# Patient Record
Sex: FEMALE | Race: WHITE
[De-identification: names, ages, dates, MRNs, and addresses within clinical notes are randomized per-mention and may not be internally consistent; named-entity substitution may affect disease eponyms.]

---

## 2017-02-27 ENCOUNTER — HOSPITAL ENCOUNTER (OUTPATIENT)
Dept: HOSPITAL 53 - M CARPUL | Age: 62
End: 2017-02-27
Attending: INTERNAL MEDICINE
Payer: COMMERCIAL

## 2017-02-27 DIAGNOSIS — C50.919: Primary | ICD-10-CM

## 2017-02-28 NOTE — ECHO
DATE OF PROCEDURE:  02/27/2017

 

REFERRING PHYSICIAN:  Abigail Maddox.

 

INDICATION:  Chemotherapy.

 

HEIGHT: 150 cm.

WEIGHT: 59 kg.

 

DIMENSION:

IVS: 0.9

LV: 4.3

LVPW:0.9

LA: 3.0

Aorta: 2.6

 

FINDINGS: The study is of acceptable technical quality even though somewhat

limited due to breast implants creating considerable shadowing artifacts.  Left

ventricle is normal size and normal systolic function with estimated EF 65-70%.

No segmental wall motion abnormalities are appreciated.  Right ventricle is also

normal systolic function.  Both atria appear normal.  All four cardiac valves

appear normal.  No pericardial effusion is noted.  Inferior vena cava is normal

size.  Aortic root, aortic arch and visualized segment of abdominal aorta all

appear normal.

 

Doppler interrogation reveals no significant aortic disease.  There is mild

mitral insufficiency and mild tricuspid insufficiency.  Calculated pulmonary

artery pressure is within normal limits.  Pulmonic valve is also functionally

competent.

 

Mitral inflow pattern and tissue Doppler imaging of mitral annulus revealed

normal diastolic function.

 

CONCLUSIONS:

1.  Study is of acceptable technical quality.

2.  Normal LV size and systolic function, normal diastolic function.

3.  No significant valvular disease.

4.  Normal central venous pressure and likely normal pulmonary artery pressure.

 

COMMENT:

Subacute bacterial endocarditis (SBE) prophylaxis is not recommended.

Essentially normal echocardiogram.

## 2017-03-01 ENCOUNTER — HOSPITAL ENCOUNTER (OUTPATIENT)
Dept: HOSPITAL 53 - M LAB REF | Age: 62
End: 2017-03-01
Attending: INTERNAL MEDICINE
Payer: COMMERCIAL

## 2017-03-01 DIAGNOSIS — C50.919: Primary | ICD-10-CM

## 2017-03-02 ENCOUNTER — HOSPITAL ENCOUNTER (OUTPATIENT)
Dept: HOSPITAL 53 - M SFHCPLAZ | Age: 62
End: 2017-03-02
Attending: NURSE PRACTITIONER
Payer: COMMERCIAL

## 2017-03-02 DIAGNOSIS — Z12.4: Primary | ICD-10-CM

## 2017-03-02 DIAGNOSIS — N95.2: ICD-10-CM

## 2017-06-02 ENCOUNTER — HOSPITAL ENCOUNTER (OUTPATIENT)
Dept: HOSPITAL 53 - M WHC | Age: 62
End: 2017-06-02
Attending: INTERNAL MEDICINE
Payer: COMMERCIAL

## 2017-06-02 DIAGNOSIS — Z78.0: ICD-10-CM

## 2017-06-02 DIAGNOSIS — Z85.3: Primary | ICD-10-CM

## 2017-06-06 NOTE — DEXA
AP SPINE   L1 - L4   1.039   -1.3       0.2

LT FEMUR   TOTAL   0.770   -1.9      -0.8

RT FEMUR   TOTAL   0.763   -1.9      -0.8

TOTAL BODY   TOTAL

OTHER



DUAL FEMUR FRAX* ASSESSMENT

Risk factors:               None.

10 year probability of fracture

Major osteoporotic fracture            11.5 %

Hip fracture                 2.2 %



COMMENTS:

There is low bone density of the spine and hips.



FOLLOW-UP:

Recommendation for the next bone density exam: 2 years.
CUATED

## 2017-12-05 ENCOUNTER — HOSPITAL ENCOUNTER (OUTPATIENT)
Dept: HOSPITAL 53 - M LAB REF | Age: 62
End: 2017-12-05
Attending: INTERNAL MEDICINE
Payer: COMMERCIAL

## 2017-12-05 DIAGNOSIS — C50.919: Primary | ICD-10-CM

## 2017-12-05 LAB
FERRITIN SERPL-MCNC: 142 NG/ML (ref 8–252)
IRON SATN MFR SERPL: 18.7 % (ref 13.2–45)
TIBC SERPL-MCNC: 326 UG/DL (ref 250–450)
VIT B12 SERPL-MCNC: 238 PG/ML (ref 247–911)

## 2018-01-04 ENCOUNTER — HOSPITAL ENCOUNTER (OUTPATIENT)
Dept: HOSPITAL 53 - M RAD | Age: 63
End: 2018-01-04
Attending: NURSE PRACTITIONER
Payer: COMMERCIAL

## 2018-01-04 DIAGNOSIS — Z90.11: Primary | ICD-10-CM

## 2018-01-04 DIAGNOSIS — Z98.82: ICD-10-CM

## 2018-01-04 DIAGNOSIS — Z85.3: ICD-10-CM

## 2018-01-04 PROCEDURE — 76642 ULTRASOUND BREAST LIMITED: CPT

## 2018-06-04 ENCOUNTER — HOSPITAL ENCOUNTER (OUTPATIENT)
Dept: HOSPITAL 53 - M LAB | Age: 63
End: 2018-06-04
Attending: PODIATRIST
Payer: COMMERCIAL

## 2018-06-04 DIAGNOSIS — Z79.899: ICD-10-CM

## 2018-06-04 DIAGNOSIS — B35.1: ICD-10-CM

## 2018-06-04 DIAGNOSIS — Z51.81: Primary | ICD-10-CM

## 2018-06-04 LAB
ALBUMIN/GLOBULIN RATIO: 1.22 (ref 1–1.93)
ALBUMIN: 3.9 GM/DL (ref 3.2–5.2)
ALKALINE PHOSPHATASE: 48 U/L (ref 45–117)
ALT SERPL W P-5'-P-CCNC: 17 U/L (ref 12–78)
ANION GAP: 11 MEQ/L (ref 8–16)
AST SERPL-CCNC: 15 U/L (ref 7–37)
BILIRUB CONJ SERPL-MCNC: < 0.1 MG/DL (ref 0–0.2)
BILIRUBIN,TOTAL: 0.2 MG/DL (ref 0.2–1)
BLOOD UREA NITROGEN: 20 MG/DL (ref 7–18)
CALCIUM LEVEL: 9 MG/DL (ref 8.8–10.2)
CARBON DIOXIDE LEVEL: 25 MEQ/L (ref 21–32)
CHLORIDE LEVEL: 107 MEQ/L (ref 98–107)
CREATININE FOR GFR: 0.74 MG/DL (ref 0.55–1.3)
GFR SERPL CREATININE-BSD FRML MDRD: > 60 ML/MIN/{1.73_M2} (ref 45–?)
GLUCOSE, FASTING: 79 MG/DL (ref 70–100)
HEMATOCRIT: 38.1 % (ref 36–47)
HEMOGLOBIN: 12.9 G/DL (ref 12–15.5)
MEAN CORPUSCULAR HEMOGLOBIN: 30.9 PG (ref 27–33)
MEAN CORPUSCULAR HGB CONC: 33.9 G/DL (ref 32–36.5)
MEAN CORPUSCULAR VOLUME: 91.4 FL (ref 80–96)
NRBC BLD AUTO-RTO: 0 % (ref 0–0)
PHOSPHORUS LEVEL: 3.1 MG/DL (ref 2.5–4.9)
PLATELET COUNT, AUTOMATED: 202 10^3/UL (ref 150–450)
POTASSIUM SERUM: 3.6 MEQ/L (ref 3.5–5.1)
RED BLOOD COUNT: 4.17 10^6/UL (ref 4–5.4)
RED CELL DISTRIBUTION WIDTH: 13.2 % (ref 11.5–14.5)
SODIUM LEVEL: 143 MEQ/L (ref 136–145)
TOTAL PROTEIN: 7.1 GM/DL (ref 6.4–8.2)
WHITE BLOOD COUNT: 5.1 10^3/UL (ref 4–10)

## 2018-06-04 PROCEDURE — 80076 HEPATIC FUNCTION PANEL: CPT

## 2018-09-12 ENCOUNTER — HOSPITAL ENCOUNTER (OUTPATIENT)
Dept: HOSPITAL 53 - M LAB | Age: 63
End: 2018-09-12
Attending: PODIATRIST
Payer: COMMERCIAL

## 2018-09-12 DIAGNOSIS — Z51.81: Primary | ICD-10-CM

## 2018-09-12 DIAGNOSIS — B35.1: ICD-10-CM

## 2018-09-12 DIAGNOSIS — Z79.899: ICD-10-CM

## 2018-09-12 LAB
ALBUMIN/GLOBULIN RATIO: 1.21 (ref 1–1.93)
ALBUMIN: 4.1 GM/DL (ref 3.2–5.2)
ALKALINE PHOSPHATASE: 46 U/L (ref 45–117)
ALT SERPL W P-5'-P-CCNC: 19 U/L (ref 12–78)
ANION GAP: 4 MEQ/L (ref 8–16)
AST SERPL-CCNC: 13 U/L (ref 7–37)
BILIRUB CONJ SERPL-MCNC: < 0.1 MG/DL (ref 0–0.2)
BILIRUBIN,TOTAL: 0.2 MG/DL (ref 0.2–1)
BLOOD UREA NITROGEN: 16 MG/DL (ref 7–18)
CALCIUM LEVEL: 9.5 MG/DL (ref 8.8–10.2)
CARBON DIOXIDE LEVEL: 30 MEQ/L (ref 21–32)
CHLORIDE LEVEL: 105 MEQ/L (ref 98–107)
CREATININE FOR GFR: 0.78 MG/DL (ref 0.55–1.3)
GFR SERPL CREATININE-BSD FRML MDRD: > 60 ML/MIN/{1.73_M2} (ref 45–?)
GLUCOSE, FASTING: 96 MG/DL (ref 70–100)
HEMATOCRIT: 39.4 % (ref 36–47)
HEMOGLOBIN: 13.2 G/DL (ref 12–15.5)
MEAN CORPUSCULAR HEMOGLOBIN: 30.1 PG (ref 27–33)
MEAN CORPUSCULAR HGB CONC: 33.5 G/DL (ref 32–36.5)
MEAN CORPUSCULAR VOLUME: 89.7 FL (ref 80–96)
NRBC BLD AUTO-RTO: 0 % (ref 0–0)
PHOSPHORUS LEVEL: 2.9 MG/DL (ref 2.5–4.9)
PLATELET COUNT, AUTOMATED: 222 10^3/UL (ref 150–450)
POTASSIUM SERUM: 3.7 MEQ/L (ref 3.5–5.1)
RED BLOOD COUNT: 4.39 10^6/UL (ref 4–5.4)
RED CELL DISTRIBUTION WIDTH: 12.6 % (ref 11.5–14.5)
SODIUM LEVEL: 139 MEQ/L (ref 136–145)
TOTAL PROTEIN: 7.5 GM/DL (ref 6.4–8.2)
WHITE BLOOD COUNT: 4.9 10^3/UL (ref 4–10)

## 2018-09-12 PROCEDURE — 80076 HEPATIC FUNCTION PANEL: CPT

## 2018-09-19 ENCOUNTER — HOSPITAL ENCOUNTER (OUTPATIENT)
Dept: HOSPITAL 53 - M RAD | Age: 63
End: 2018-09-19
Attending: SURGERY
Payer: COMMERCIAL

## 2018-09-19 DIAGNOSIS — Z92.3: ICD-10-CM

## 2018-09-19 DIAGNOSIS — Z92.21: ICD-10-CM

## 2018-09-19 DIAGNOSIS — Z80.3: ICD-10-CM

## 2018-09-19 DIAGNOSIS — Z85.3: Primary | ICD-10-CM

## 2018-09-19 DIAGNOSIS — Z90.13: ICD-10-CM

## 2018-09-19 DIAGNOSIS — Z98.82: ICD-10-CM

## 2019-06-03 ENCOUNTER — HOSPITAL ENCOUNTER (OUTPATIENT)
Dept: HOSPITAL 53 - M WHC | Age: 64
End: 2019-06-03
Attending: INTERNAL MEDICINE
Payer: COMMERCIAL

## 2019-06-03 DIAGNOSIS — Z78.0: ICD-10-CM

## 2019-06-03 DIAGNOSIS — M85.89: Primary | ICD-10-CM

## 2019-06-04 NOTE — DEXA
AP SPINE   L1 - L4   1.070   -1.0       0.3

LT FEMUR   TOTAL   0.788   -1.7      -0.7

LT NECK      0.786   -1.9      -0.6

RT FEMUR   TOTAL   0.796   -1.7      -0.7      

RT NECK      0.723   -2.3      -1.0

TOTAL BODY   TOTAL

OTHER



COMMENTS:

There is low bone density of the spine and hips.

The density of the spine has increased 3.0% since 06/02/2017.

The density of the left hip has increased 2.3% since 06/02/2017.

The density of the right hip has increased 4.3% since 06/02/2017.

The increased density of the spine does represent a significant change.

The increased density of the left hip does represent a significant change.

The increased density of the right hip does represent a significant change.



FOLLOW-UP:

Recommendation for the next bone density exam: 2 years.



TAVO

## 2019-08-06 ENCOUNTER — HOSPITAL ENCOUNTER (OUTPATIENT)
Dept: HOSPITAL 53 - M SFHCPLAZ | Age: 64
End: 2019-08-06
Attending: NURSE PRACTITIONER
Payer: COMMERCIAL

## 2019-08-06 DIAGNOSIS — Z01.419: Primary | ICD-10-CM

## 2020-08-04 ENCOUNTER — HOSPITAL ENCOUNTER (OUTPATIENT)
Dept: HOSPITAL 53 - M SFHCPLAZ | Age: 65
End: 2020-08-04
Attending: NURSE PRACTITIONER
Payer: COMMERCIAL

## 2020-08-04 DIAGNOSIS — Z01.419: Primary | ICD-10-CM

## 2020-12-01 ENCOUNTER — HOSPITAL ENCOUNTER (OUTPATIENT)
Dept: HOSPITAL 53 - M LAB | Age: 65
End: 2020-12-01
Attending: PHYSICIAN ASSISTANT
Payer: COMMERCIAL

## 2020-12-01 DIAGNOSIS — Z01.818: Primary | ICD-10-CM

## 2020-12-01 LAB
CREAT SERPL-MCNC: 0.74 MG/DL (ref 0.55–1.3)
GFR SERPL CREATININE-BSD FRML MDRD: > 60 ML/MIN/{1.73_M2} (ref 45–?)

## 2020-12-03 LAB — BUN SERPL-MCNC: 17 MG/DL (ref 7–18)

## 2020-12-08 ENCOUNTER — HOSPITAL ENCOUNTER (OUTPATIENT)
Dept: HOSPITAL 53 - M RAD | Age: 65
End: 2020-12-08
Attending: PHYSICIAN ASSISTANT
Payer: COMMERCIAL

## 2020-12-08 DIAGNOSIS — Z98.82: ICD-10-CM

## 2020-12-08 DIAGNOSIS — Z85.3: Primary | ICD-10-CM

## 2020-12-08 DIAGNOSIS — Z80.3: ICD-10-CM

## 2020-12-08 DIAGNOSIS — Z90.13: ICD-10-CM

## 2020-12-08 NOTE — REP
INDICATION:

PERSONAL H/O BREAST CA SCREENING.  BRCA 2 mutation.  Right-sided pain.



COMPARISON:

Comparison bilateral breast MRI study is from September 19, 2018.



TECHNIQUE:

Three Ilene MRI imaging was performed with a dedicated breast coil.  Axial, coronal,

and sagittal T1 and T2 weighted scans were obtained with and without fat saturation in

the usual fashion.  The study includes dynamically acquired post gadolinium-enhanced

imaging with image subtraction.  Maximum intensity projection and multi planar

reformation imaging is included as well.  This study is interpreted with the aid of

[employer], an FDA approved computer aided detection (CAD) software program, on a

dedicated breast MRI workstation.  The gadolinium enhancement dose is 10 mL of

intravenous ProHance.



FINDINGS:

Patient is status post bilateral mastectomy and breast reconstruction.  There are

multiple small foci of micrometallic artifact in the breast tissue bilaterally.  There

is a small amount of fluid surrounding each augmentation implant, left again slightly

more prominent than right but unchanged from the comparison study.  Implant margins

are smooth.  There is no evidence of intracapsular or extracapsular implant

disruption.  There is no evidence of axillary lymphadenopathy.  No cyst or other fluid

collection is seen.  No suspicious morphologic abnormality is seen on either side.

Dynamically acquired sequential postcontrast images show no suspicious focus of

enhancement and/or washout in either breast to suggest malignancy.  Subtraction images

are unremarkable.



IMPRESSION:

Stable BI-RADS category 2 benign findings.  No suspicious abnormality.  There is a

small amount of Tawana implant fluid bilaterally, left a little greater than right

unchanged from the comparison study September 2018.





<Electronically signed by Armani Murphy > 12/08/20 [id number]

## 2021-02-25 ENCOUNTER — HOSPITAL ENCOUNTER (OUTPATIENT)
Dept: HOSPITAL 53 - M LAB | Age: 66
End: 2021-02-25
Attending: PSYCHIATRY & NEUROLOGY
Payer: COMMERCIAL

## 2021-02-25 DIAGNOSIS — E53.9: ICD-10-CM

## 2021-02-25 DIAGNOSIS — R25.1: Primary | ICD-10-CM

## 2021-02-25 LAB
ALBUMIN SERPL BCG-MCNC: 4 GM/DL (ref 3.2–5.2)
ALT SERPL W P-5'-P-CCNC: 54 U/L (ref 12–78)
BASOPHILS # BLD AUTO: 0 10^3/UL (ref 0–0.2)
BASOPHILS NFR BLD AUTO: 0.2 % (ref 0–1)
BILIRUB SERPL-MCNC: 0.7 MG/DL (ref 0.2–1)
BUN SERPL-MCNC: 22 MG/DL (ref 7–18)
CALCIUM SERPL-MCNC: 9.1 MG/DL (ref 8.8–10.2)
CHLORIDE SERPL-SCNC: 92 MEQ/L (ref 98–107)
CO2 SERPL-SCNC: 29 MEQ/L (ref 21–32)
CREAT SERPL-MCNC: 0.68 MG/DL (ref 0.55–1.3)
EOSINOPHIL # BLD AUTO: 0 10^3/UL (ref 0–0.5)
EOSINOPHIL NFR BLD AUTO: 0 % (ref 0–3)
FOLATE SERPL-MCNC: 7.1 NG/ML
GFR SERPL CREATININE-BSD FRML MDRD: > 60 ML/MIN/{1.73_M2} (ref 45–?)
GLUCOSE SERPL-MCNC: 125 MG/DL (ref 70–100)
HCT VFR BLD AUTO: 37.7 % (ref 36–47)
HGB BLD-MCNC: 12.7 G/DL (ref 12–15.5)
LYMPHOCYTES # BLD AUTO: 0.7 10^3/UL (ref 1.5–5)
LYMPHOCYTES NFR BLD AUTO: 6.8 % (ref 24–44)
MCH RBC QN AUTO: 30.2 PG (ref 27–33)
MCHC RBC AUTO-ENTMCNC: 33.7 G/DL (ref 32–36.5)
MCV RBC AUTO: 89.5 FL (ref 80–96)
MONOCYTES # BLD AUTO: 0.5 10^3/UL (ref 0–0.8)
MONOCYTES NFR BLD AUTO: 5 % (ref 2–8)
NEUTROPHILS # BLD AUTO: 9.4 10^3/UL (ref 1.5–8.5)
NEUTROPHILS NFR BLD AUTO: 87.4 % (ref 36–66)
PLATELET # BLD AUTO: 301 10^3/UL (ref 150–450)
POTASSIUM SERPL-SCNC: 3.9 MEQ/L (ref 3.5–5.1)
PROT SERPL-MCNC: 7 GM/DL (ref 6.4–8.2)
RBC # BLD AUTO: 4.21 10^6/UL (ref 4–5.4)
SODIUM SERPL-SCNC: 133 MEQ/L (ref 136–145)
TSH SERPL DL<=0.005 MIU/L-ACNC: 0.61 UIU/ML (ref 0.36–3.74)
VIT B12 SERPL-MCNC: 390 PG/ML
WBC # BLD AUTO: 10.7 10^3/UL (ref 4–10)

## 2021-03-04 ENCOUNTER — HOSPITAL ENCOUNTER (INPATIENT)
Dept: HOSPITAL 53 - M ED | Age: 66
LOS: 3 days | Discharge: HOME | End: 2021-03-07
Attending: FAMILY MEDICINE | Admitting: GENERAL PRACTICE
Payer: COMMERCIAL

## 2021-03-04 VITALS — WEIGHT: 118.17 LBS | HEIGHT: 59 IN | BODY MASS INDEX: 23.82 KG/M2

## 2021-03-04 DIAGNOSIS — R74.01: ICD-10-CM

## 2021-03-04 DIAGNOSIS — Z87.891: ICD-10-CM

## 2021-03-04 DIAGNOSIS — Z20.822: ICD-10-CM

## 2021-03-04 DIAGNOSIS — R00.1: ICD-10-CM

## 2021-03-04 DIAGNOSIS — Z79.899: ICD-10-CM

## 2021-03-04 DIAGNOSIS — R26.81: ICD-10-CM

## 2021-03-04 DIAGNOSIS — Z85.3: ICD-10-CM

## 2021-03-04 DIAGNOSIS — G47.00: ICD-10-CM

## 2021-03-04 DIAGNOSIS — F10.139: Primary | ICD-10-CM

## 2021-03-04 DIAGNOSIS — D64.9: ICD-10-CM

## 2021-03-04 DIAGNOSIS — I16.0: ICD-10-CM

## 2021-03-04 DIAGNOSIS — F32.9: ICD-10-CM

## 2021-03-04 DIAGNOSIS — Z90.13: ICD-10-CM

## 2021-03-04 DIAGNOSIS — F41.9: ICD-10-CM

## 2021-03-04 DIAGNOSIS — Z90.49: ICD-10-CM

## 2021-03-04 LAB
A-TOCOPHEROL VIT E SERPL-MCNC: 12.8 MG/L (ref 9–29)
ALBUMIN SERPL BCG-MCNC: 4.2 GM/DL (ref 3.2–5.2)
ALT SERPL W P-5'-P-CCNC: 60 U/L (ref 12–78)
BASOPHILS # BLD AUTO: 0 10^3/UL (ref 0–0.2)
BASOPHILS NFR BLD AUTO: 0.2 % (ref 0–1)
BILIRUB CONJ SERPL-MCNC: < 0.1 MG/DL (ref 0–0.2)
BILIRUB SERPL-MCNC: 0.3 MG/DL (ref 0.2–1)
BUN SERPL-MCNC: 14 MG/DL (ref 7–18)
CALCIUM SERPL-MCNC: 9 MG/DL (ref 8.8–10.2)
CERULOPLASMIN SERPL-MCNC: 22.2 MG/DL (ref 19–39)
CHLORIDE SERPL-SCNC: 96 MEQ/L (ref 98–107)
CK MB CFR.DF SERPL CALC: 4.17
CK MB SERPL-MCNC: 2.5 NG/ML (ref ?–3.6)
CK SERPL-CCNC: 60 U/L (ref 26–192)
CO2 SERPL-SCNC: 18 MEQ/L (ref 21–32)
COPPER SERPL-MCNC: 107 UG/DL (ref 80–158)
CREAT SERPL-MCNC: 0.7 MG/DL (ref 0.55–1.3)
EOSINOPHIL # BLD AUTO: 0 10^3/UL (ref 0–0.5)
EOSINOPHIL NFR BLD AUTO: 0.1 % (ref 0–3)
GAMMA TOCOPHEROL SERPL-MCNC: 3.2 MG/L (ref 0.5–4.9)
GFR SERPL CREATININE-BSD FRML MDRD: > 60 ML/MIN/{1.73_M2} (ref 45–?)
GLUCOSE SERPL-MCNC: 77 MG/DL (ref 70–100)
HCT VFR BLD AUTO: 41.3 % (ref 36–47)
HGB BLD-MCNC: 13.7 G/DL (ref 12–15.5)
LEAD BLDV-MCNC: 1 UG/DL (ref 0–4)
LIPASE SERPL-CCNC: 82 U/L (ref 73–393)
LYMPHOCYTES # BLD AUTO: 1.3 10^3/UL (ref 1.5–5)
LYMPHOCYTES NFR BLD AUTO: 12.6 % (ref 24–44)
MCH RBC QN AUTO: 29.9 PG (ref 27–33)
MCHC RBC AUTO-ENTMCNC: 33.2 G/DL (ref 32–36.5)
MCV RBC AUTO: 90.2 FL (ref 80–96)
MERCURY BLD-MCNC: 1.1 UG/L (ref 0–14.9)
MONOCYTES # BLD AUTO: 0.8 10^3/UL (ref 0–0.8)
MONOCYTES NFR BLD AUTO: 7.9 % (ref 2–8)
NEUTROPHILS # BLD AUTO: 7.8 10^3/UL (ref 1.5–8.5)
NEUTROPHILS NFR BLD AUTO: 78.4 % (ref 36–66)
PLATELET # BLD AUTO: 353 10^3/UL (ref 150–450)
POTASSIUM SERPL-SCNC: 4.4 MEQ/L (ref 3.5–5.1)
PROT SERPL-MCNC: 7.8 GM/DL (ref 6.4–8.2)
PYRIDOXAL PHOS SERPL-MCNC: 3.8 UG/L (ref 2–32.8)
RBC # BLD AUTO: 4.58 10^6/UL (ref 4–5.4)
SODIUM SERPL-SCNC: 136 MEQ/L (ref 136–145)
TROPONIN I SERPL-MCNC: < 0.02 NG/ML (ref ?–0.1)
VIT B1 BLD-SCNC: 106.5 NMOL/L (ref 66.5–200)
WBC # BLD AUTO: 9.9 10^3/UL (ref 4–10)

## 2021-03-04 NOTE — REPVR
PROCEDURE INFORMATION: 

Exam: XR Complete Acute Abdomen Series 

Exam date and time: 3/4/2021 8:52 PM 

Age: 65 years old 

Clinical indication: Other: Abdominal pain 



TECHNIQUE: 

Imaging protocol: XR complete acute abdomen series, including 2 or more views 

of the abdomen and a single view chest. 



COMPARISON: 

CR Chest, 2 view PA, Lat 6/20/2016 2:02 PM 



FINDINGS: 

Lungs: Normal. No consolidation. 

Pleural spaces: Normal. No pleural effusions. No pneumothorax. 

Heart/Mediastinum: Normal. No cardiomegaly. 

Gastrointestinal tract: Normal. No bowel dilation. 

Intraperitoneal space: Normal. No free air. 

Bones/joints: Normal. No acute fracture. 

Soft tissues: Normal. 



IMPRESSION: 

No acute findings. 



Electronically signed by: Lenin Smith On 03/04/2021  21:08:15 PM

## 2021-03-05 VITALS — OXYGEN SATURATION: 98 % | SYSTOLIC BLOOD PRESSURE: 142 MMHG | DIASTOLIC BLOOD PRESSURE: 67 MMHG

## 2021-03-05 VITALS — OXYGEN SATURATION: 97 %

## 2021-03-05 VITALS — OXYGEN SATURATION: 98 % | DIASTOLIC BLOOD PRESSURE: 70 MMHG | SYSTOLIC BLOOD PRESSURE: 128 MMHG

## 2021-03-05 VITALS — DIASTOLIC BLOOD PRESSURE: 80 MMHG | SYSTOLIC BLOOD PRESSURE: 137 MMHG

## 2021-03-05 VITALS — OXYGEN SATURATION: 98 %

## 2021-03-05 VITALS — SYSTOLIC BLOOD PRESSURE: 134 MMHG | DIASTOLIC BLOOD PRESSURE: 64 MMHG

## 2021-03-05 VITALS — OXYGEN SATURATION: 96 %

## 2021-03-05 VITALS — DIASTOLIC BLOOD PRESSURE: 57 MMHG | OXYGEN SATURATION: 96 % | SYSTOLIC BLOOD PRESSURE: 116 MMHG

## 2021-03-05 VITALS — DIASTOLIC BLOOD PRESSURE: 57 MMHG | SYSTOLIC BLOOD PRESSURE: 104 MMHG

## 2021-03-05 LAB
ALBUMIN SERPL BCG-MCNC: 2.9 GM/DL (ref 3.2–5.2)
ALT SERPL W P-5'-P-CCNC: 39 U/L (ref 12–78)
AMPHETAMINES UR QL SCN: NEGATIVE
BARBITURATES UR QL SCN: NEGATIVE
BENZODIAZ UR QL SCN: POSITIVE
BILIRUB SERPL-MCNC: 0.7 MG/DL (ref 0.2–1)
BUN SERPL-MCNC: 12 MG/DL (ref 7–18)
BZE UR QL SCN: NEGATIVE
CALCIUM SERPL-MCNC: 7.1 MG/DL (ref 8.8–10.2)
CANNABINOIDS UR QL SCN: POSITIVE
CHLORIDE SERPL-SCNC: 103 MEQ/L (ref 98–107)
CO2 SERPL-SCNC: 26 MEQ/L (ref 21–32)
CREAT SERPL-MCNC: 0.66 MG/DL (ref 0.55–1.3)
GFR SERPL CREATININE-BSD FRML MDRD: > 60 ML/MIN/{1.73_M2} (ref 45–?)
GLUCOSE SERPL-MCNC: 182 MG/DL (ref 70–100)
HCT VFR BLD AUTO: 27.7 % (ref 36–47)
HGB BLD-MCNC: 9.6 G/DL (ref 12–15.5)
MCH RBC QN AUTO: 31 PG (ref 27–33)
MCHC RBC AUTO-ENTMCNC: 34.7 G/DL (ref 32–36.5)
MCV RBC AUTO: 89.4 FL (ref 80–96)
METHADONE UR QL SCN: NEGATIVE
OPIATES UR QL SCN: NEGATIVE
PCP UR QL SCN: NEGATIVE
PLATELET # BLD AUTO: 240 10^3/UL (ref 150–450)
POTASSIUM SERPL-SCNC: 3.6 MEQ/L (ref 3.5–5.1)
PROT SERPL-MCNC: 5.6 GM/DL (ref 6.4–8.2)
RBC # BLD AUTO: 3.1 10^6/UL (ref 4–5.4)
RSV RNA NPH QL NAA+PROBE: NEGATIVE
SODIUM SERPL-SCNC: 136 MEQ/L (ref 136–145)
WBC # BLD AUTO: 6 10^3/UL (ref 4–10)

## 2021-03-05 RX ADMIN — NITROGLYCERIN SCH GM: 20 OINTMENT TOPICAL at 00:35

## 2021-03-05 RX ADMIN — NITROGLYCERIN SCH GM: 20 OINTMENT TOPICAL at 04:00

## 2021-03-05 RX ADMIN — OXAZEPAM SCH MG: 10 CAPSULE, GELATIN COATED ORAL at 21:51

## 2021-03-05 RX ADMIN — NITROGLYCERIN SCH GM: 20 OINTMENT TOPICAL at 08:00

## 2021-03-05 RX ADMIN — SODIUM CHLORIDE, PRESERVATIVE FREE SCH ML: 5 INJECTION INTRAVENOUS at 09:22

## 2021-03-05 RX ADMIN — DULOXETINE SCH MG: 20 CAPSULE, DELAYED RELEASE ORAL at 11:14

## 2021-03-05 RX ADMIN — THIAMINE HYDROCHLORIDE SCH MLS/HR: 100 INJECTION, SOLUTION INTRAMUSCULAR; INTRAVENOUS at 11:14

## 2021-03-05 RX ADMIN — OXAZEPAM SCH MG: 10 CAPSULE, GELATIN COATED ORAL at 09:22

## 2021-03-05 RX ADMIN — ENOXAPARIN SODIUM SCH MG: 40 INJECTION SUBCUTANEOUS at 09:00

## 2021-03-05 RX ADMIN — OXAZEPAM SCH MG: 10 CAPSULE, GELATIN COATED ORAL at 16:00

## 2021-03-05 RX ADMIN — THIAMINE HYDROCHLORIDE SCH MLS/HR: 100 INJECTION, SOLUTION INTRAMUSCULAR; INTRAVENOUS at 21:50

## 2021-03-05 RX ADMIN — SODIUM CHLORIDE, PRESERVATIVE FREE SCH ML: 5 INJECTION INTRAVENOUS at 21:53

## 2021-03-05 NOTE — IPNPDOC
Date Seen


The patient was seen on 3/5/21.





Progress Note


SUBJECTIVE: 64 yo F with hx of breast ca s/p mastectomy w reconstruction, 

chrohn's disease (followed by Dr. Shell and Dr. David), c diff 

endometriosis, anxiety, depression, etoh use disorder, sent to ER from urgent 

care with symptoms of nausea, tremors, hypertensive urgency 2/2 suspected acute 

alcohol withdrawal. 





Patient was seen and examined at bedside this morning. Blood pressure normalized

after IV labetalol, clonidine and benzodiazepines. She is tremulous, and 

somnolent but is alert to person, place and time. She denies visual or auditory 

hallucination or SI. No chest pain, no fevers, chills, SOB, palpitations or 

n/v/d.





OBJECTIVE


PHYSICAL EXAMINATION:


VITAL SIGNS: please see below


General: somnolent, tremulous


HEENT: PERRLA, EOMI, sclerae clear


Neck: supple, normal ROM, no JVD


Respiratory: lungs CTAB, no wheeze, no rales, no crackles


CVS: RRR, normal S1, S2, no murmurs


Abdo: soft, no masses, no hepatosplenomegaly, BS+, no rebound tenderness


Extremities: no edema, pulses 2+


MSK: no joint deformities, normal ROM


Neuro: no focal neuro deficits, moving all 4 extremities, CN2-12 intact. 

Strength 5/5 in all 4 extremities. No nystagmus. 


Psych: calm, cooperative, AAO x 3. tremulous. somnolent.





LABORATORY DATA, IMAGING STUDIES, MICROBIOLOGY: Please see below.





DVT prophylaxis ordered?: SCDs. TEDs. Lovenox.





ASSESSMENT AND PLAN





Alcohol withdrawal


- continue with PCU status


- tox screen, serum etoh pending


- ongoing CIWA protocol


- changed valium to ativan 2 mg q1h prn for CIWA > 8 


- serax 20 mg q6h scheduled


- continuous O2 monitoring


- start thiamine 500 gm q8h x 3 days


- MVTs/B12/folate


- PFS for etoh rehab program





Hypertensive urgency


- DC clonidine


- BP normalized s/p labetalol and clonidine


- Hypertensive episode likely due to acute etoh withdrawal


- will monitor BP and titrate, amlodipine if BP elevation recurs


- hydralazine PO prn





Breast cancer status post bilateral mastectomy with reconstruction 


-Outpatient follow-up with her medical oncologist





History of Endometriosis


-No acute complaints


- follow up outpatient with gyne





anxiety, depression, insomnia, 


-Chronic


- duloxetine at home


- will hold today, resume on 3/6/21 as presently receiving high dose 

benzodiazepine for etoh withdrawal.


- trazodone held until 3/6/21





prior history of tobacco abuse. 


-No documented diagnosis of COPD. no wheezing on exam





Crohn's disease followed by Dr. Sotelo and Dr. Shell. 


-Outpatient follow-up





Dispo: pending clinical improvement





VS, I&O, 24H, Fishbone


Vital Signs/I&O





Vital Signs








  Date Time  Temp Pulse Resp B/P (MAP) Pulse Ox O2 Delivery O2 Flow Rate FiO2


 


3/5/21 08:00 98.7 72 16 116/57 (76) 95 Room Air  














I&O- Last 24 Hours up to 6 AM 


 


 3/5/21





 06:00


 


Intake Total 112 ml


 


Output Total 0 ml


 


Balance 112 ml











Laboratory Data


24H LABS


Laboratory Tests 2


3/4/21 20:32: 


Immature Granulocyte % (Auto) 0.8, Neutrophils (%) (Auto) 78.4H, Lymphocytes (%)

(Auto) 12.6L, Monocytes (%) (Auto) 7.9, Eosinophils (%) (Auto) 0.1, Basophils 

(%) (Auto) 0.2, Neutrophils # (Auto) 7.8, Lymphocytes # (Auto) 1.3L, Monocytes #

(Auto) 0.8, Eosinophils # (Auto) 0.0, Basophils # (Auto) 0.0, Nucleated Red 

Blood Cells % (auto) 0.0, Anion Gap 22H, Glomerular Filtration Rate > 60.0, 

Calcium Level 9.0, Total Bilirubin 0.3, Direct Bilirubin < 0.1, Aspartate Amino 

Transf (AST/SGOT) 45H, Alanine Aminotransferase (ALT/SGPT) 60, Alkaline 

Phosphatase 74, Total Creatine Kinase 60, Creatine Kinase MB 2.5, Creatine Suzy

se MB Relative Index 4.17H, Troponin I < 0.02, Total Protein 7.8, Albumin 4.2, 

Albumin/Globulin Ratio 1.2, Lipase 82


3/5/21 00:13: 


Coronavirus (COVID-19)(PCR) NEGATIVE, Influenza Type A (RT-PCR) NEGATIVE, 

Influenza Type B (RT-PCR) NEGATIVE, Respiratory Syncytial Virus (PCR) NEGATIVE


3/5/21 04:13: 


Nucleated Red Blood Cells % (auto) 0.0, Anion Gap 7L, Glomerular Filtration Rate

> 60.0, Calcium Level 7.1#L, Total Bilirubin 0.7#, Aspartate Amino Transf (AS

T/SGOT) 27, Alanine Aminotransferase (ALT/SGPT) 39, Alkaline Phosphatase 47, 

Total Protein 5.6#L, Albumin 2.9#L, Albumin/Globulin Ratio 1.1L


3/5/21 09:00: 


CBC/BMP


Laboratory Tests


3/4/21 20:32








3/5/21 04:13








Microbiology





Microbiology


3/5/21 Respiratory Virus Panel (PCR) ([facility]), Received


         Pending











LA PACHECO MD        Mar 5, 2021 10:08

## 2021-03-05 NOTE — HPEPDOC
[facility] Medical History & Physical


Date of Admission


Mar 5, 2021


Date of Service:  Mar 5, 2021





History and Physical


CHIEF COMPLAINT: Nausea, tremors





HISTORY OF PRESENT ILLNESS: 





65-year-old female with history of alcohol abuse, last drink was yesterday, 

drinks 2 vodkas daily , sent to the ER from wellness now on [facility] where

she presented with 1 day history of persistent nausea, tremors, after being 

diagnosed with alcohol withdrawal and hypertensive urgency. Patient denies any 

abdominal pain, headache, changes in vision, paresthesias, abdominal pain, 

vomiting, shortness of breath, chest pain, pressure, tightness, upper or lower 

extremity weakness, fever, chills. In the ER, she was found to have a blood pres

sure of 213/100 mmHg, tremulous, tachycardic, and was given Ativan, Serax, IV 

fluids, multivitamin, thiamine and folate. Abdominal x-ray was negative.  

Hospitalist was called to admit for alcohol withdrawal.








PAST MEDICAL HISTORY:


Breast cancer status post bilateral mastectomy with reconstruction E

ndometriosis, anxiety, depression, insomnia, prior history of cigarette use. 

Crohn's disease followed by Dr. Sotelo and Dr. Shell. Clostridium difficile

colitis grade 1 internal hemorrhoids on colonoscopy rectosigmoid colitis





PAST SURGICAL HISTORY:


Colonoscopy. Laparoscopy for endometriosis in , partial colectomy for 

Crohn's disease. Breast cancer  bilateral mastectomy with reconstruction 







SOCIAL HISTORY:


Remote smoker, alcohol abuse, last drink was yesterday, drinks 2 vodka  daily. 

Full code. No healthcare proxy.





FAMILY HISTORY:


. Father  age 82 diabetes, brain hemorrhage, hypertension, bacterial 

infection in the brain. Mother , age 80, colon cancer, hypertension, 

CAD, Parkinson, brother age 55 with Crohn's, on Remicade, sister with Crohn's, 

status post ileostomy





ALLERGIES: Please see below.





REVIEW OF SYSTEMS:


10 point systems negative aside from positive findings in HPI





HOME MEDICATIONS: Please see below. 





PHYSICAL EXAMINATION:


VITAL SIGNS: See below


GENERAL APPEARANCE: Restless. No jaundice or icterus. Tremulousness


HEENT: Extraocular muscles intact. Able to speak in full sentences. No JVD, 

thyromegaly. Dry mucous membranes. No cervical lymphadenopathy


CARDIOVASCULAR: S1, S2, sinus tachycardia


LUNGS: Clear to auscultation. No wheezing, rales or rhonchi. Well-healed breast 

surgical scars from prior mastectomy and reconstruction


ABDOMEN: Positive bowel sounds, soft, nontender, nondistended. No rebound or 

guarding. no caput medusae No palmar erythema


EXTREMITIES: No cyanosis, clubbing or pitting edema


NEUROLOGICAL: Flapping tremors 5 out of 5 motor function 4 extremities. No 

sensory changes








LABORATORY DATA: See below.





IMAGING: seebelow





MICROBIOLOGY: Please see below. 





ASSESSMENT/PLAN:





65-year-old female with history of alcohol abuse, last drink was yesterday, 

drinks 2 vodkas daily , sent to the ER from wellness now on [facility] where

she presented with 1 day history of persistent nausea, tremors, after being 

diagnosed with alcohol withdrawal and hypertensive urgency. Patient denies any 

abdominal pain, headache, changes in vision, paresthesias, abdominal pain, 

vomiting, shortness of breath, chest pain, pressure, tightness, upper or lower 

extremity weakness, fever, chills. In the ER, she was found to have a blood 

pressure of 213/100 mmHg, tremulous, tachycardic, and was given Ativan, Serax, 

IV fluids, multivitamin, thiamine and folate. Abdominal x-ray was negative.  

Hospitalist was called to admit for alcohol withdrawal.





Alcohol withdrawal


-Patient will be admitted to PCU telemetry, given IV Valium 5 mg every 4 hourly 

with holding parameters for respiratory depression AND mental status. Banana bag

1. Continue with multivitamin, thiamine and folate. Alcohol cessation 

counseling has been provided. Prior to hospital discharge, Consult pFS for 

alcohol rehabilitation programs





Hypertensive urgency


-Secondary to alcohol withdrawal


-Clonidine every 4 hourly with holding parameters, nitroglycerin ointment every 

4 hourly with holding parameters





Breast cancer status post bilateral mastectomy with reconstruction 


-Outpatient follow-up with her medical oncologist





History of Endometriosis


-No acute complaints





anxiety, depression, insomnia, 


-Chronic





prior history of tobacco abuse. 


-No documented diagnosis of COPD. no wheezing on exam





Crohn's disease followed by Dr. Sotelo and Dr. Shell. 


-Outpatient follow-up





Diet 2 g sodium





CODE STATUS full code





DVT prophylaxis. Compression stockings





Vital Signs





Vital Signs








  Date Time  Temp Pulse Resp B/P (MAP) Pulse Ox O2 Delivery O2 Flow Rate FiO2


 


3/5/21 01:17  127  185/87    


 


3/5/21 00:55     98   


 


3/4/21 22:55   19   Room Air  


 


3/4/21 20:03 97.9       











Laboratory Data


Labs 24H


Laboratory Tests 2


3/4/21 20:32: 


Immature Granulocyte % (Auto) 0.8, Neutrophils (%) (Auto) 78.4H, Lymphocytes (%)

(Auto) 12.6L, Monocytes (%) (Auto) 7.9, Eosinophils (%) (Auto) 0.1, Basophils 

(%) (Auto) 0.2, Neutrophils # (Auto) 7.8, Lymphocytes # (Auto) 1.3L, Monocytes #

(Auto) 0.8, Eosinophils # (Auto) 0.0, Basophils # (Auto) 0.0, Nucleated Red Bl

ood Cells % (auto) 0.0, Anion Gap 22H, Glomerular Filtration Rate > 60.0, 

Calcium Level 9.0, Total Bilirubin 0.3, Direct Bilirubin < 0.1, Aspartate Amino 

Transf (AST/SGOT) 45H, Alanine Aminotransferase (ALT/SGPT) 60, Alkaline 

Phosphatase 74, Total Creatine Kinase 60, Creatine Kinase MB 2.5, Creatine 

Kinase MB Relative Index 4.17H, Troponin I < 0.02, Total Protein 7.8, Albumin 

4.2, Albumin/Globulin Ratio 1.2, Lipase 82


3/5/21 00:13: 


Coronavirus (COVID-19)(PCR) NEGATIVE, Influenza Type A (RT-PCR) NEGATIVE, 

Influenza Type B (RT-PCR) NEGATIVE, Respiratory Syncytial Virus (PCR) NEGATIVE


CBC/BMP


Laboratory Tests


3/4/21 20:32











Home Medications


Scheduled


Duloxetine Hcl (Duloxetine HCl) 20 Mg Capsule.dr, 20 MG PO DAILY


Trazodone HCl (Trazodone HCl) 100 Mg Tab, 100 MG PO QHS





Scheduled PRN


Prochlorperazine Maleate (Prochlorperazine Maleate) 10 Mg Tablet, 10 MG PO TID 

PRN for NAUSEA OR VOMITING





Allergies


Coded Allergies:  


     No Known Allergies (Unverified , 3/4/21)





A-FIB/CHADSVASC


A-FIB History


Current/History of A-Fib/PAF?:  No


Current PO Anticoag Therapy:  No





Age/Risk Factor Scoring


CHADSVASC:  








CHADSVASC Response (Comments) Value


 


Age Risk Factor Age < 65 years old 0


 


Gender Risk Factor Female 1


 


Hx of CHF No 0


 


Hx of HTN Yes 1


 


Hx of Stroke/TIA/or VTE No 0


 


Hx of Diabetes No 0


 


Hx of Vascular Disease No 0


 


Total  2











Treatment


Treatment ordered:  NONE











TIMI HAND MD             Mar 5, 2021 01:59

## 2021-03-05 NOTE — REPVR
PROCEDURE INFORMATION: 

Exam: CT Head Without Contrast 

Exam date and time: 3/5/2021 12:43 AM 

Age: 65 years old 

Clinical indication: Other: HTN; Additional info: HTN urgency R/O ich 213/100 



TECHNIQUE: 

Imaging protocol: Computed tomography of the head without contrast. 

Radiation optimization: All CT scans at this facility use at least one of these 

dose optimization techniques: automated exposure control; mA and/or kV 

adjustment per patient size (includes targeted exams where dose is matched to 

clinical indication); or iterative reconstruction. 



COMPARISON: 

MRI-Brain W/O FOLL BY WITH 2/15/2016 12:35 PM 



FINDINGS: 

Limitations: Patient motion. Vertex is excluded from the field of view. 



Brain: No definite acute intracranial hemorrhage. No midline shift or 

intracranial mass effect. 

Cerebral ventricles: No hydrocephalus. 

Bones/joints: No definite acute calvarial fracture is visualized. 

Paranasal sinuses: Visualized sinuses are unremarkable. No fluid levels. 

Mastoid air cells: Visualized mastoid air cells are well aerated. 

Soft tissues: Unremarkable. 



IMPRESSION: 

1. Examination is limited by patient motion and exclusion of the vertex from 

the field of view. 

2. No definite acute intracranial abnormality to the degree visualized. 



Electronically signed by: Haile Soto On 03/05/2021  01:50:20 AM

## 2021-03-05 NOTE — ECGEPIP
[facility] - ED

                                       

                                       Test Date:    2021

Pat Name:     GINGER RAMIREZ            Department:   

Patient ID:   [id number]                 Room:         [place] [id number]

Gender:       Female                   Technician:   [id number]

:          [id number]               Requested By: BARBRA SHELLEY

Order Number: [id number]     Reading MD:   Emperatriz Miller

                                 Measurements

Intervals                              Axis          

Rate:         112                      P:            72

NC:           126                      QRS:          -20

QRSD:         76                       T:            61

QT:           332                                    

QTc:          453                                    

                           Interpretive Statements

Sinus tachycardia

increased rate 16

Electronically Signed on 3-5-2021 8:18:45 EST by Emperatriz Miller

## 2021-03-06 VITALS — DIASTOLIC BLOOD PRESSURE: 89 MMHG | OXYGEN SATURATION: 97 % | SYSTOLIC BLOOD PRESSURE: 176 MMHG

## 2021-03-06 VITALS — OXYGEN SATURATION: 96 %

## 2021-03-06 VITALS — SYSTOLIC BLOOD PRESSURE: 174 MMHG | DIASTOLIC BLOOD PRESSURE: 87 MMHG | OXYGEN SATURATION: 96 %

## 2021-03-06 VITALS — OXYGEN SATURATION: 98 %

## 2021-03-06 VITALS — DIASTOLIC BLOOD PRESSURE: 83 MMHG | SYSTOLIC BLOOD PRESSURE: 175 MMHG | OXYGEN SATURATION: 97 %

## 2021-03-06 VITALS — DIASTOLIC BLOOD PRESSURE: 97 MMHG | SYSTOLIC BLOOD PRESSURE: 160 MMHG

## 2021-03-06 VITALS — OXYGEN SATURATION: 98 % | DIASTOLIC BLOOD PRESSURE: 78 MMHG | SYSTOLIC BLOOD PRESSURE: 148 MMHG

## 2021-03-06 VITALS — SYSTOLIC BLOOD PRESSURE: 172 MMHG | DIASTOLIC BLOOD PRESSURE: 92 MMHG

## 2021-03-06 VITALS — SYSTOLIC BLOOD PRESSURE: 160 MMHG | OXYGEN SATURATION: 98 % | DIASTOLIC BLOOD PRESSURE: 65 MMHG

## 2021-03-06 VITALS — OXYGEN SATURATION: 97 %

## 2021-03-06 VITALS — SYSTOLIC BLOOD PRESSURE: 207 MMHG | DIASTOLIC BLOOD PRESSURE: 100 MMHG

## 2021-03-06 VITALS — OXYGEN SATURATION: 99 %

## 2021-03-06 LAB
ALBUMIN SERPL BCG-MCNC: 2.8 GM/DL (ref 3.2–5.2)
ALT SERPL W P-5'-P-CCNC: 38 U/L (ref 12–78)
BILIRUB SERPL-MCNC: 0.4 MG/DL (ref 0.2–1)
BUN SERPL-MCNC: 8 MG/DL (ref 7–18)
CALCIUM SERPL-MCNC: 8.1 MG/DL (ref 8.8–10.2)
CHLORIDE SERPL-SCNC: 110 MEQ/L (ref 98–107)
CO2 SERPL-SCNC: 26 MEQ/L (ref 21–32)
CREAT SERPL-MCNC: 0.46 MG/DL (ref 0.55–1.3)
FERRITIN SERPL-MCNC: 388 NG/ML (ref 8–252)
GFR SERPL CREATININE-BSD FRML MDRD: > 60 ML/MIN/{1.73_M2} (ref 45–?)
GLUCOSE SERPL-MCNC: 91 MG/DL (ref 70–100)
HCT VFR BLD AUTO: 29.4 % (ref 36–47)
HGB BLD-MCNC: 9.7 G/DL (ref 12–15.5)
IRON SATN MFR SERPL: 24.3 % (ref 13.2–45)
IRON SERPL-MCNC: 57 UG/DL (ref 50–170)
MAGNESIUM SERPL-MCNC: 1.2 MG/DL (ref 1.8–2.4)
MCH RBC QN AUTO: 29.8 PG (ref 27–33)
MCHC RBC AUTO-ENTMCNC: 33 G/DL (ref 32–36.5)
MCV RBC AUTO: 90.5 FL (ref 80–96)
PLATELET # BLD AUTO: 194 10^3/UL (ref 150–450)
POTASSIUM SERPL-SCNC: 3.1 MEQ/L (ref 3.5–5.1)
PROT SERPL-MCNC: 5.2 GM/DL (ref 6.4–8.2)
RBC # BLD AUTO: 3.25 10^6/UL (ref 4–5.4)
SODIUM SERPL-SCNC: 142 MEQ/L (ref 136–145)
TIBC SERPL-MCNC: 235 UG/DL (ref 250–450)
WBC # BLD AUTO: 3.7 10^3/UL (ref 4–10)

## 2021-03-06 RX ADMIN — THIAMINE HYDROCHLORIDE SCH MLS/HR: 100 INJECTION, SOLUTION INTRAMUSCULAR; INTRAVENOUS at 21:47

## 2021-03-06 RX ADMIN — SODIUM CHLORIDE, PRESERVATIVE FREE SCH ML: 5 INJECTION INTRAVENOUS at 14:00

## 2021-03-06 RX ADMIN — MAGNESIUM SULFATE IN DEXTROSE SCH MLS/HR: 10 INJECTION, SOLUTION INTRAVENOUS at 08:35

## 2021-03-06 RX ADMIN — ENOXAPARIN SODIUM SCH MG: 40 INJECTION SUBCUTANEOUS at 08:35

## 2021-03-06 RX ADMIN — THIAMINE HYDROCHLORIDE SCH MLS/HR: 100 INJECTION, SOLUTION INTRAMUSCULAR; INTRAVENOUS at 04:00

## 2021-03-06 RX ADMIN — OXAZEPAM SCH MG: 10 CAPSULE, GELATIN COATED ORAL at 20:08

## 2021-03-06 RX ADMIN — OXAZEPAM SCH MG: 10 CAPSULE, GELATIN COATED ORAL at 15:33

## 2021-03-06 RX ADMIN — OXAZEPAM SCH MG: 10 CAPSULE, GELATIN COATED ORAL at 05:10

## 2021-03-06 RX ADMIN — MAGNESIUM SULFATE IN DEXTROSE SCH MLS/HR: 10 INJECTION, SOLUTION INTRAVENOUS at 10:05

## 2021-03-06 RX ADMIN — SODIUM CHLORIDE, PRESERVATIVE FREE SCH ML: 5 INJECTION INTRAVENOUS at 06:11

## 2021-03-06 RX ADMIN — OXAZEPAM SCH MG: 10 CAPSULE, GELATIN COATED ORAL at 08:34

## 2021-03-06 RX ADMIN — HYDRALAZINE HYDROCHLORIDE PRN MG: 10 TABLET, FILM COATED ORAL at 13:24

## 2021-03-06 RX ADMIN — HYDRALAZINE HYDROCHLORIDE PRN MG: 10 TABLET, FILM COATED ORAL at 20:08

## 2021-03-06 RX ADMIN — THIAMINE HYDROCHLORIDE SCH MLS/HR: 100 INJECTION, SOLUTION INTRAMUSCULAR; INTRAVENOUS at 12:07

## 2021-03-06 RX ADMIN — SODIUM CHLORIDE, PRESERVATIVE FREE SCH ML: 5 INJECTION INTRAVENOUS at 21:48

## 2021-03-06 RX ADMIN — DULOXETINE SCH MG: 20 CAPSULE, DELAYED RELEASE ORAL at 08:34

## 2021-03-06 NOTE — IPNPDOC
Date Seen


The patient was seen on 3/6/21.





Progress Note


SUBJECTIVE: 64 yo F with hx of breast ca s/p mastectomy w reconstruction, 

chrohn's disease (followed by Dr. Shell and Dr. David), c diff 

endometriosis, anxiety, depression, etoh use disorder, sent to ER from urgent 

care with symptoms of nausea, tremors, hypertensive urgency 2/2 suspected acute 

alcohol withdrawal. 





Patient was seen and examined at bedside this morning. She is still mildly 

tremulous, and somnolent but is alert to person, place and time. Reported 

unsteady gait. She denies visual or auditory hallucination or SI. No chest pain,

no fevers, chills, SOB, palpitations or n/v/d.





OBJECTIVE


PHYSICAL EXAMINATION:


VITAL SIGNS: please see below


General: somnolent, tremulous


HEENT: PERRLA, EOMI, sclerae clear


Neck: supple, normal ROM, no JVD


Respiratory: lungs CTAB, no wheeze, no rales, no crackles


CVS: RRR, normal S1, S2, no murmurs


Abdo: soft, no masses, no hepatosplenomegaly, BS+, no rebound tenderness


Extremities: no edema, pulses 2+


MSK: no joint deformities, normal ROM


Neuro: no focal neuro deficits, moving all 4 extremities, CN2-12 intact. 

Strength 5/5 in all 4 extremities. No nystagmus. 


Psych: calm, cooperative, AAO x 3. tremulous. somnolent.





LABORATORY DATA, IMAGING STUDIES, MICROBIOLOGY: Please see below.





DVT prophylaxis ordered?: SCDs. TEDs. Lovenox.





ASSESSMENT AND PLAN





Alcohol withdrawal


- continue with PCU status


- tox screen neg (benzo given in hospital), negative etoh


- ongoing CIWA protocol


- changed valium to ativan 2 mg q1h prn for CIWA > 8 


- serax 20 mg q6h scheduled


- continuous O2 monitoring


- thiamine 500 gm q8h x 3 days


- MVTs/B12/folate


- PFS for etoh rehab program





Hypertensive urgency


- DC clonidine


- Hypertensive episode likely due to acute etoh withdrawal


- will monitor BP and titrate, amlodipine if BP elevation recurs


- hydralazine PO prn





Breast cancer status post bilateral mastectomy with reconstruction 


- Outpatient follow-up with her medical oncologist





History of Endometriosis


-No acute complaints


- follow up outpatient with gyne





anxiety, depression, insomnia, 


- Chronic


- duloxetine


- trazodone 





prior history of tobacco abuse. 


-No documented diagnosis of COPD. no wheezing on exam





Crohn's disease followed by Dr. Sotelo and Dr. Shell. 


-Outpatient follow-up





Dispo: pending clinical improvement. PT eval requested due to unsteady gait in 

setting of acute etoh withdrawal.





VS, I&O, 24H, Fishbone


Vital Signs/I&O





Vital Signs








  Date Time  Temp Pulse Resp B/P (MAP) Pulse Ox O2 Delivery O2 Flow Rate FiO2


 


3/6/21 04:00     96 Room Air  


 


3/6/21 04:00  78  174/87    


 


3/6/21 04:00 97.6  18     














I&O- Last 24 Hours up to 6 AM 


 


 3/6/21





 06:00


 


Intake Total 2530 ml


 


Output Total 1450 ml


 


Balance 1080 ml











Laboratory Data


24H LABS


Laboratory Tests 2


3/6/21 03:58: 


Nucleated Red Blood Cells % (auto) 0.0, Anion Gap 6L, Glomerular Filtration Rate

> 60.0, Calcium Level 8.1L, Magnesium Level 1.2L, Iron Level 57, Total Iron 

Binding Capacity 235L, Transferrin % Saturation 24.3, Ferritin 388H, Total 

Bilirubin 0.4, Aspartate Amino Transf (AST/SGOT) 37, Alanine Aminotransferase 

(ALT/SGPT) 38, Alkaline Phosphatase 55, Total Protein 5.2L, Albumin 2.8L, 

Albumin/Globulin Ratio 1.2


CBC/BMP


Laboratory Tests


3/6/21 03:58








Microbiology





Microbiology


3/5/21 Respiratory Virus Panel (PCR) ([facility]) - Final, Complete











LA PACHECO MD        Mar 6, 2021 10:52

## 2021-03-07 VITALS — DIASTOLIC BLOOD PRESSURE: 88 MMHG | SYSTOLIC BLOOD PRESSURE: 150 MMHG

## 2021-03-07 VITALS — DIASTOLIC BLOOD PRESSURE: 87 MMHG | SYSTOLIC BLOOD PRESSURE: 169 MMHG

## 2021-03-07 VITALS — SYSTOLIC BLOOD PRESSURE: 160 MMHG | DIASTOLIC BLOOD PRESSURE: 90 MMHG

## 2021-03-07 VITALS — DIASTOLIC BLOOD PRESSURE: 84 MMHG | SYSTOLIC BLOOD PRESSURE: 166 MMHG

## 2021-03-07 VITALS — DIASTOLIC BLOOD PRESSURE: 9 MMHG | SYSTOLIC BLOOD PRESSURE: 188 MMHG

## 2021-03-07 VITALS — SYSTOLIC BLOOD PRESSURE: 160 MMHG | DIASTOLIC BLOOD PRESSURE: 98 MMHG

## 2021-03-07 LAB
ALBUMIN SERPL BCG-MCNC: 2.8 GM/DL (ref 3.2–5.2)
ALT SERPL W P-5'-P-CCNC: 111 U/L (ref 12–78)
BILIRUB SERPL-MCNC: 0.4 MG/DL (ref 0.2–1)
BUN SERPL-MCNC: 7 MG/DL (ref 7–18)
CALCIUM SERPL-MCNC: 8.2 MG/DL (ref 8.8–10.2)
CHLORIDE SERPL-SCNC: 109 MEQ/L (ref 98–107)
CO2 SERPL-SCNC: 25 MEQ/L (ref 21–32)
CREAT SERPL-MCNC: 0.59 MG/DL (ref 0.55–1.3)
GFR SERPL CREATININE-BSD FRML MDRD: > 60 ML/MIN/{1.73_M2} (ref 45–?)
GLUCOSE SERPL-MCNC: 84 MG/DL (ref 70–100)
HCT VFR BLD AUTO: 33.7 % (ref 36–47)
HGB BLD-MCNC: 10.9 G/DL (ref 12–15.5)
MAGNESIUM SERPL-MCNC: 1.3 MG/DL (ref 1.8–2.4)
MCH RBC QN AUTO: 29.5 PG (ref 27–33)
MCHC RBC AUTO-ENTMCNC: 32.3 G/DL (ref 32–36.5)
MCV RBC AUTO: 91.1 FL (ref 80–96)
PLATELET # BLD AUTO: 221 10^3/UL (ref 150–450)
POTASSIUM SERPL-SCNC: 4 MEQ/L (ref 3.5–5.1)
PROT SERPL-MCNC: 5.4 GM/DL (ref 6.4–8.2)
RBC # BLD AUTO: 3.7 10^6/UL (ref 4–5.4)
SODIUM SERPL-SCNC: 140 MEQ/L (ref 136–145)
T4 FREE SERPL-MCNC: 1.09 NG/DL (ref 0.76–1.46)
TSH SERPL DL<=0.005 MIU/L-ACNC: 1.81 UIU/ML (ref 0.36–3.74)
WBC # BLD AUTO: 4.1 10^3/UL (ref 4–10)

## 2021-03-07 RX ADMIN — HYDRALAZINE HYDROCHLORIDE PRN MG: 10 TABLET, FILM COATED ORAL at 04:49

## 2021-03-07 RX ADMIN — OXAZEPAM SCH MG: 10 CAPSULE, GELATIN COATED ORAL at 02:34

## 2021-03-07 RX ADMIN — ENOXAPARIN SODIUM SCH MG: 40 INJECTION SUBCUTANEOUS at 10:27

## 2021-03-07 RX ADMIN — MAGNESIUM SULFATE IN DEXTROSE SCH MLS/HR: 10 INJECTION, SOLUTION INTRAVENOUS at 08:44

## 2021-03-07 RX ADMIN — MAGNESIUM SULFATE IN DEXTROSE SCH MLS/HR: 10 INJECTION, SOLUTION INTRAVENOUS at 10:27

## 2021-03-07 RX ADMIN — OXAZEPAM SCH MG: 10 CAPSULE, GELATIN COATED ORAL at 08:45

## 2021-03-07 RX ADMIN — SODIUM CHLORIDE, PRESERVATIVE FREE SCH ML: 5 INJECTION INTRAVENOUS at 04:00

## 2021-03-07 RX ADMIN — DULOXETINE SCH MG: 20 CAPSULE, DELAYED RELEASE ORAL at 08:45

## 2021-03-07 RX ADMIN — THIAMINE HYDROCHLORIDE SCH MLS/HR: 100 INJECTION, SOLUTION INTRAMUSCULAR; INTRAVENOUS at 04:00

## 2021-03-07 NOTE — REP
INDICATION:

transaminitis



COMPARISON:

03/28/2008



TECHNIQUE:

Real time gray scale ultrasound examination using curved array transducer.



FINDINGS:

Pancreas demonstrates mild fatty infiltration without focal hepatic lesion.



Gallbladder is contracted and without obvious gallstones or pericholecystic fluid.

Small gallbladder polyp(s) cannot be excluded.  Common bile duct is minimally dilated

to 8 mm which is a nonspecific finding.  There is no intrahepatic biliary dilatation.



Visualized portions of the pancreas are unremarkable.



The right kidney is normal in reniform shape without hydronephrosis and measures 10.1

x 3.9 x 3.9 cm.



No ascites in the visualized right upper quadrant.









IMPRESSION:

1.  Hepatosteatosis.

2.  Incomplete evaluation of the gallbladder and biliary system due to contracted

gallbladder and recent meal.





<Electronically signed by Yung Troy > 03/07/21 0933

## 2021-03-07 NOTE — DS.PDOC
Discharge Summary


General


Date of Admission


Mar 5, 2021 at 00:01


Date of Discharge


3/7/21





Discharge Summary


PROCEDURES PERFORMED DURING STAY: None





ADMITTING DIAGNOSES: 


acute alcohol withdrawal


hypertensive urgency


hx of breast cancer s/p bilateral mastectomy with reconstruction


history of endometriosis


anxiety, depression, insomnia


prior hx of tobacco abuse


hx of Crohn's disease, actively followed by Dr. Sotelo and Dr. Shell





DISCHARGE DIAGNOSES:


acute alcohol withdrawal


hypertensive urgency


hx of breast cancer s/p bilateral mastectomy with reconstruction


history of endometriosis


anxiety, depression, insomnia


prior hx of tobacco abuse


hx of Crohn's disease, actively followed by Dr. Sotelo and Dr. Shell


COMPLICATIONS/CHIEF COMPLAINT: Alchol Withdrawal.





HISTORY OF PRESENT ILLNESS: 65-year-old female with history of alcohol abuse, 

last drink was yesterday, drinks 2 vodkas daily , sent to the ER from [facility] 

now on [facility] where she presented with 1 day history of persistent 

nausea, tremors, after being diagnosed with alcohol withdrawal and hypertensive 

urgency. Patient denies any abdominal pain, headache, changes in vision, 

paresthesias, abdominal pain, vomiting, shortness of breath, chest pain, 

pressure, tightness, upper or lower extremity weakness, fever, chills. In the 

ER, she was found to have a blood pressure of 213/100 mmHg, tremulous, 

tachycardic, and was given Ativan, Serax, IV fluids, multivitamin, thiamine and 

folate. Abdominal x-ray was negative.  Hospitalist was called to admit for 

alcohol withdrawal.





HOSPITAL COURSE: 





Alcohol withdrawal


- continue with PCU status


- tox screen neg (benzo given in hospital), negative etoh


- ongoing CIWA protocol


- changed valium to ativan 2 mg q1h prn for CIWA > 8 


- serax 20 mg q6h scheduled while inpatient, serax taper given on DC


- continuous O2 monitoring


- thiamine 500 gm q8h x 3 days


- MVTs/B12/folate


- PFS for etoh rehab program


- PT cleared for DC.





Hypertensive urgency


- DC clonidine


- Hypertensive episode likely due to acute etoh withdrawal


-started on amlodipine 5 mg BID


- HCTZ 12.5 mg daily





Bradycardia


- noted to be bradycardic to 35 while asleep, asymptomatic


- will reduce trazodone to 50 mg qhs





Anemia likely 2/2 dilutional effect


- 13.7 to 9.6 after 3L boluses


- improved to 10.9 on DC


- no signs of bleeding


- repeat CBC as outpatient





Breast cancer status post bilateral mastectomy with reconstruction 


- Outpatient follow-up with her medical oncologist





Transaminitis


- hx of etoh use


- liver US did not show intrahepatic duct dilation


- repeat CMP as outpatient





History of Endometriosis


-N o acute complaints


- follow up outpatient with gyne





anxiety, depression, insomnia, 


- Chronic


- duloxetine


- trazodone 





prior history of tobacco abuse. 


-No documented diagnosis of COPD. no wheezing on exam





Crohn's disease followed by Dr. Sotelo and Dr. Shell. 


-Outpatient follow-up





DISCHARGE MEDICATIONS: Please see below.


 


ALLERGIES: Please see below.





PHYSICAL EXAMINATION ON DISCHARGE:


VITAL SIGNS: please see below


General: NAD, comfortable


HEENT: PERRLA, EOMI, sclerae clear


Neck: supple, normal ROM, no JVD


Respiratory: lungs CTAB, no wheeze, no rales, no crackles


CVS: RRR, normal S1, S2, no murmurs


Abdo: soft, no masses, no hepatosplenomegaly, BS+, no rebound tenderness


Extremities: no edema, pulses 2+


MSK: no joint deformities, normal ROM


Neuro: no focal neuro deficits, moving all 4 extremities, CN2-12 intact. 

Strength 5/5 in all 4 extremities. No nystagmus. 


Psych: calm, cooperative, AAO x 3





LABORATORY DATA: Please see below.





IMAGING: 





Liver US (3/5/21):


FINDINGS:


Pancreas demonstrates mild fatty infiltration without focal hepatic lesion.





Gallbladder is contracted and without obvious gallstones or pericholecystic 

fluid.


Small gallbladder polyp(s) cannot be excluded.  Common bile duct is minimally 

dilated


to 8 mm which is a nonspecific finding.  There is no intrahepatic biliary 

dilatation.





Visualized portions of the pancreas are unremarkable.





The right kidney is normal in reniform shape without hydronephrosis and measures

10.1


x 3.9 x 3.9 cm.





No ascites in the visualized right upper quadrant.





IMPRESSION:


1.  Hepatosteatosis.


2.  Incomplete evaluation of the gallbladder and biliary system due to 

contracted


gallbladder and recent meal





Abdomen (3/4/21):


FINDINGS: 


Lungs: Normal. No consolidation. 


Pleural spaces: Normal. No pleural effusions. No pneumothorax. 


Heart/Mediastinum: Normal. No cardiomegaly. 


Gastrointestinal tract: Normal. No bowel dilation. 


Intraperitoneal space: Normal. No free air. 


Bones/joints: Normal. No acute fracture. 


Soft tissues: Normal. 





IMPRESSION: 


No acute findings. 





Head CT (3/5/21):


IMPRESSION: 


1. Examination is limited by patient motion and exclusion of the vertex from 


the field of view. 


2. No definite acute intracranial abnormality to the degree visualized. 





PROGNOSIS: good





ACTIVITY:[As tolerated





DIET: 2g Na restricted





DISCHARGE PLAN: Discharge home with PCP and GI follow-up. Advised not to drink. 

Serax taper for alcohol withdrawal.





DISPOSITION: To home. Patient will ask family friend to stay with her





DISCHARGE INSTRUCTIONS:


. Please follow-up with your primary care doctor within 3-5 days


. Please follow-up with gastroenterology within 1-2 weeks


. Please taking medications as prescribed.


. If you develop bleeding, chest pain, shortness of breath, seizures, nausea, 

fevers, or otherwise worsening of your symptoms, please call 911 or return to 

the nearest emergency room





TO FOLLOW UP AS OUTPATIENT:


- please repeat CBC and CMP to assess for AST/ALT (mildly elevated inpatient)





DISCHARGE CONDITION: Stable





TIME SPENT ON DISCHARGE: 35 minutes





Vital Signs/I&Os





Vital Signs








  Date Time  Temp Pulse Resp B/P (MAP) Pulse Ox O2 Delivery O2 Flow Rate FiO2


 


3/7/21 08:47  120  169/87    


 


3/7/21 08:00 98.1  18  97 Room Air  














I&O- Last 24 Hours up to 6 AM 


 


 3/7/21





 06:00


 


Intake Total 3140 ml


 


Output Total 2900 ml


 


Balance 240 ml











Laboratory Data


Labs 24H


Laboratory Tests 2


3/7/21 05:18: 


Anion Gap 6L, Glomerular Filtration Rate > 60.0, Calcium Level 8.2L, Magnesium L

evel 1.3L, Total Bilirubin 0.4, Aspartate Amino Transf (AST/SGOT) 169H, Alanine 

Aminotransferase (ALT/SGPT) 111H, Alkaline Phosphatase 67, Total Protein 5.4L, 

Albumin 2.8L, Albumin/Globulin Ratio 1.1L


3/7/21 07:39: 


Reticulocyte # (auto) 72.2, Nucleated Red Blood Cells % (auto) 0.0, Percent 

Reticulocyte Count 2.0H, Reticulocyte Hemoglobin Equivalent 35.6


CBC/BMP


Laboratory Tests


3/7/21 05:18








3/7/21 07:39











Microbiology





Microbiology


3/5/21 Respiratory Virus Panel (PCR) ([facility]) - Final, Complete





Discharge Medications


Scheduled


Amlodipine Besylate (Amlodipine Besylate) 5 Mg Tablet, 5 MG PO BID


Duloxetine Hcl (Duloxetine HCl) 20 Mg Capsule.dr, 20 MG PO DAILY, (Reported)


Hydrochlorothiazide (Hydrochlorothiazide) 12.5 Mg Capsule, 12.5 MG PO DAILY


Oxazepam (Oxazepam) 10 Mg Capsule, 10 MG PO TID


Trazodone HCl (Trazodone HCl) 100 Mg Tab, 100 MG PO QHS, (Reported)





Allergies


Coded Allergies:  


     No Known Allergies (Unverified , 3/4/21)











LA PACHECO MD        Mar 7, 2021 11:01

## 2021-03-08 LAB
25(OH)D3 SERPL-MCNC: 31.7 NG/ML (ref 30–100)
FOLATE SERPL-MCNC: 7 NG/ML (ref 5.4–?)
VIT B12 SERPL-MCNC: 433 PG/ML (ref 247–911)

## 2021-06-22 ENCOUNTER — HOSPITAL ENCOUNTER (OUTPATIENT)
Dept: HOSPITAL 53 - M WHC | Age: 66
End: 2021-06-22
Attending: INTERNAL MEDICINE
Payer: COMMERCIAL

## 2021-06-22 DIAGNOSIS — M85.851: Primary | ICD-10-CM

## 2021-06-22 DIAGNOSIS — M81.0: ICD-10-CM

## 2021-06-22 NOTE — DEXAMM
INDICATION:

OSTEOPENIA.



COMPARISON:

Comparison studies are from Zenaida 3, 2019 and June 2, 2017..



TECHNIQUE:

Bone density was measured using dual-energy x-ray absorptionmetry (DEXA).



FINDINGS:

AP SPINE L1-L4

BMD 1.103 g/cm2

Young Adult T-Score -0.7

Age Matched Z-Score 0.8.



LT FEMUR, TOTAL

BMD 0.780 g/cm2

Young Adult T-Score -1.8

Age Matched Z-Score -0.7.



LT NECK

BMD 0.716 g/cm2

Young Adult T-Score -2.3

Age Matched Z-Score -0.9.



RT FEMUR, TOTAL

BMD 0.770 g/cm2

Young Adult T-Score -1.9

Age Matched Z-Score -0.8.



RT NECK

BMD 0.682 g/cm2

Young Adult T-Score -2.6

Age Matched Z-Score -1.2.



IMPRESSION:

There is normal bone density of the spine.



There is low bone density of the left hip.





There is osteoporosis of the right hip.



The density of the spine has increased 6.2% since the initial exam on June 2, 2017.





The density of the spine increased 3.1% since most recent exam on Zenaida 3, 2019.





The density of the left hip has increased 1.3% since initial exam on June 2, 2017.





The density of the left hip has decreased 1.0% since most recent exam on Zenaida 3, 2019..





The density of the right hip has increased 0.9% since the initial exam on June 2, 2017.





The density of the right hip has decreased 3.3% since the most recent exam on Zenaida 3,

2019.



FOLLOW-UP:

Recommendation for the next bone density exam: 2 years.





<Electronically signed by Armani Murphy > 06/22/21 0927

## 2021-10-22 ENCOUNTER — HOSPITAL ENCOUNTER (OUTPATIENT)
Dept: HOSPITAL 53 - M LABSMTC | Age: 66
End: 2021-10-22
Attending: ANESTHESIOLOGY
Payer: COMMERCIAL

## 2021-10-22 DIAGNOSIS — Z20.822: ICD-10-CM

## 2021-10-22 DIAGNOSIS — Z01.812: Primary | ICD-10-CM

## 2021-10-27 ENCOUNTER — HOSPITAL ENCOUNTER (OUTPATIENT)
Dept: HOSPITAL 53 - M OPP | Age: 66
Discharge: HOME | End: 2021-10-27
Attending: INTERNAL MEDICINE
Payer: COMMERCIAL

## 2021-10-27 VITALS — WEIGHT: 136.4 LBS | HEIGHT: 59 IN | BODY MASS INDEX: 27.5 KG/M2

## 2021-10-27 VITALS — DIASTOLIC BLOOD PRESSURE: 79 MMHG | SYSTOLIC BLOOD PRESSURE: 168 MMHG

## 2021-10-27 DIAGNOSIS — Z87.891: ICD-10-CM

## 2021-10-27 DIAGNOSIS — K64.0: ICD-10-CM

## 2021-10-27 DIAGNOSIS — Z86.19: ICD-10-CM

## 2021-10-27 DIAGNOSIS — Z85.3: ICD-10-CM

## 2021-10-27 DIAGNOSIS — K50.10: Primary | ICD-10-CM

## 2021-10-27 DIAGNOSIS — Z92.3: ICD-10-CM

## 2021-10-27 DIAGNOSIS — Z92.21: ICD-10-CM

## 2021-10-27 DIAGNOSIS — Z79.891: ICD-10-CM

## 2021-10-27 DIAGNOSIS — Z79.899: ICD-10-CM

## 2021-10-27 NOTE — CCD
Summarization of Episode Note

                             Created on: 09/10/2021



NARA GINGER MICHAEL

External Reference #: [id number]

: 1955

Sex: Female



Demographics





                          Address                   [address] [place]  [address]

 

                          Home Phone                [phone]

 

                          Preferred Language        Unknown

 

                          Marital Status            Unknown

 

                          Sabianist Affiliation     Unknown

 

                          Race                      White

 

                          Ethnic Group              Not  or 





Author





                          Author                    [facility]

ems

 

                          Organization              [facility]

ems

 

                          Address                   Unknown

 

                          Phone                     Unavailable







Support





                Name            Relationship    Address         Phone

 

                    GINGER RAMIREZ                [address] [place]  [address]                    [phone]

 

                    Nara Marko      JOSELYN                [address] [place]  [address]                    [phone]







Care Team Providers





                    Care Team Member Name Role                Phone

 

                    Michelle Umaña    Unavailable         [phone]







PROBLEMS





          Type      Condition ICD9-CM Code QZR00-BO Code Onset Dates Condition S

tatus W/U 

Status              Risk                SNOMED Code         Notes

 

       Problem Hx of Crohn's disease        Z87.19        Active confirmed      

  [id number]  

 

       Problem Wellness examination        Z00.00        Active confirmed       

 [id number]  

 

       Problem Breast nodule        N63           Active confirmed        906277

03  

 

       Problem Insomnia        G47.00        Active confirmed        741779450  

 

       Problem Family history of colon cancer        Z80.0         Active confir

med        [id number]  

 

       Problem Low magnesium levels        E83.42        Active confirmed       

 505107340  

 

        Problem GERD (gastroesophageal reflux disease)         K21.9           A

ctive  confirmed         

[id number]                               She has a history of esophageal reflux d

isease which is quiescent at 

present and for which she takes no medication.

 

       Problem Dermatitis        L30.9         Active confirmed        17137713 

 

 

       Problem Abnormal mammogram        R92.8         Active confirmed        1

60681195  

 

       Problem Uterine mass        N85.9         Active confirmed        2064161

4  

 

       Problem Left shoulder pain        M25.512        Active confirmed        

57288104  

 

       Problem Essential hypertension        I10           Active confirmed     

   78200011 She is 

maintained on lisinopril therapy with good control of her blood pressure.

 

          Problem   Breast cancer metastasized to axillary lymph node           

C50.919             Active    

confirmed                               [id number]            

 

       Problem Magnesium deficiency        E61.2         Active confirmed       

 608268577  

 

        Problem History of Clostridium difficile colitis         Z86.19         

 Active  confirmed         

428620071                                

 

       Problem Anemia        D64.9         Active confirmed        082921679  

 

       Problem Nausea        R11.0         Active confirmed        479534444  

 

       Problem Internal hemorrhoids        K64.8         Active confirmed       

 76234456  

 

        Problem TMJ (temporomandibular joint disorder)         M26.609         A

ctive  confirmed         

35493441                                 

 

       Problem Toenail fungus        B35.1         Active confirmed        89545

9006  

 

       Problem Other chronic pain        G89.29        Active confirmed        8

9777781  

 

        Problem Hx of temporomandibular joint disorder         Z87.39          A

ctive  confirmed         

469181535                                

 

       Problem Anxiety        F41.9         Active confirmed        60661017  

 

        Problem Malignant neoplasm of right female breast         C50.911       

  Active  confirmed  

                          [id number]                 She was diagnosed with breas

t cancer in 2016 involving the 

right breast--HER-2 positive, ER/WI negative disease. She was treated with 
neoadjuvant chemotherapy (TRYPHAENA regimen) which she has now completed. She is
now scheduled for bilateral mastectomies and reconstruction surgery on 
2016.

 

       Problem Fatty liver        K76.0         Active confirmed        78670286

7  

 

        Problem Ductal carcinoma of right breast         C50.911         Active 

 confirmed         

674537333                                

 

          Problem   Alcohol withdrawal syndrome without complication           F

10.230             Active    

confirmed                               [id number]            







ALLERGIES

No Known Allergies



ENCOUNTERS from 1955 to 2021-09-10





             Encounter    Location     Date         Provider     Diagnosis

 

                    [address] [phone] [place] [address] 08 Sep, 2021

                          [facility]             







IMMUNIZATIONS





                Vaccine         Route           Administration Date Status

 

                Influenza 18 yrs & older Flublok IM Intramuscular Oct 29, 2020  

  Administered







SOCIAL HISTORY

Tobacco Use:



                    Social History Observation Description         Date

 

                    Details (start date - stop date) Former Smoker        



Sex Assigned At Birth:



                          Social History Observation Description

 

                          Sex Assigned At Birth     Unknown



Audit



                    Question            Answer              Notes

 

                    Total Score:        3                    

 

                    Interpretation:     Alcohol Education    



Sexual Hx:



                    Question            Answer              Notes

 

                    Had sex in the last 12 months (vaginal, oral, or anal)? No  

                 

 

                    Have you ever had an STD? No                   



Drug and Alcohol



                    Question            Answer              Notes

 

                    Total Score:        0                    

 

                    Interpretation:     No problems reported  



Alcohol Screening:



                    Question            Answer              Notes

 

                    Points              0                    

 

                    Interpretation      Negative             



Tobacco Use:



                    Question            Answer              Notes

 

                    Are you a:          former smoker        

 

                    How long has it been since you last smoked? 5-10 years      

     







REASON FOR REFERRAL

No Information



VITAL SIGNS

No information



MEDICATIONS





           Medication SIG (Take, Route, Frequency, Duration) Notes      Start Da

te End Date   

Status

 

           DULoxetine HCl 20 MG 1 capsule Orally Daily for 90 days              

                    Active

 

                          hydroCHLOROthiazide 12.5 MG 1 capsule in the morning O

rally Once a day for 90 

days                                                            Active

 

                          Ketotifen Fumarate 0.025 % INSTILL 1 DROP INTO BOTH EY

ES TWICE A DAY AS NEEDED 

FOR ITCHING for 25                                                 Active

 

           Trazodone HCl 100 MG 1 tablet at bedtime Orally Once a day for 90 day

s                                  

Active

 

                          Diflucan 150 MG           1 tablet Orally as directed 

1 today and repeat 1 in 10 days for 

2 days                          25 Aug, 2021                    Active

 

           amLODIPine Besylate 5 MG 1 tablet Orally twice daily for 90 days     

                             Active

 

           Loratadine 10 MG 1 tablet Orally Once a day for 90 days            09

 Oct, 2019            Active

 

           Augmentin 500-125 MG 1 tablet Orally bid for 10 days            2021            Active







PROCEDURES

No Information



RESULTS

No Results



REASON FOR VISIT

refill



MEDICAL (GENERAL) HISTORY





                    Type                Description         Date

 

                          Medical History           right breast, stage IIIA, T3

N2M0, HER2 positive, ER/WI negative.

Invasive ductal carcinoma, stage 0, T0 N0 M0 following brenton-adjuvant combined 
chemotherapy status post bilateral mastectomy with immediate 
reconstruction16 [facility] tumor involves approximately 70% of the submitted 
tissue                                   

 

                          Medical History           breast cancer treated with D

exedrine, carboplatin with  dual HER

2 agent, Herceptin and Perjeta on the TRYPHENA regimen  

 

                          Medical History           bilateral mastectomy  2016 [facility] (chose to have 

both breast removed)                     

 

                    Medical History     Hx Endometrosis      

 

                    Medical History     Depression/Anxiety use to follow with Dr Paul Ramirez  

 

                    Medical History     Insomnia             

 

                    Medical History     remote smoker        

 

                          Medical History           Crohn's use to follow with DARLENE Casanova seen by Dr. Shell 2016, advised to follow on a when necessary basis  

 

                    Medical History     C. difficile colitis 2016 after

 first infusion  

 

                          Medical History           echocardiogram 07/15/2016, e

chocardiogram 2016, LVEF 65% 

normal left ventricular size and systolic function normal diastolic function. No
significant valvular disease.            

 

                          Medical History           colonoscopy 2016, grad

e 1 internal hemorrhoids, localized 

mild inflammation was found in the rectosigmoid colon secondary to colitis.  

 

                    Medical History     17 Bone Density  

 

                    Surgical History    endometriosis       80's

 

                    Surgical History    Partial colectomy for Crohn's disease 

 

                    Surgical History    Bilateral mastectomy with reconstruction

 2016

 

                    Hospitalization History [facility]-colonic resection 

 

                    Hospitalization History Ruptured ear drums   

 

                          Hospitalization History   [facility]- Primary-Intractable pamela

rrhea secondary to colitis- 

Secondary-History of C-Diff in past(ruled out), Right breast cancer undergoing 
chemotherapy, History of Crohn's disease status post colon resection in past 

-2016

 

                          Hospitalization History   [facility]- Fevers, Secondary throm

ocytopenia, Diarrhea with a 

negative GI panel, Hypokalemia, Hypomagnesemia, Ductal carcinoma of the right 
breast, Hx of crohns disease, HTN, Anemia -2016

 

                          Hospitalization History   [facility]-acute alcohol withdrawal

, hypertensive urgency 

anxiety                                 3/5-2021







Goals Section

No Information



Health Concerns

No Information



MEDICAL EQUIPMENT

No Information



MENTAL STATUS

No Information



FUNCTIONAL STATUS

No Information



ASSESSMENTS

No Information



PLAN OF TREATMENT

Medication



                Medication Name Sig             Start Date      Stop Date

 

                DULoxetine HCl 20 MG 1 capsule Orally Daily for 90 days         

         

 

                amLODIPine Besylate 5 MG 1 tablet Orally twice daily for 90 days

                  

 

                          Ketotifen Fumarate 0.025 % INSTILL 1 DROP INTO BOTH EY

ES TWICE A DAY AS NEEDED 

FOR ITCHING for 25                                   

 

                          Diflucan 150 MG           1 tablet Orally as directed 

1 today and repeat 1 in 10 days for 

2 days                    25 Aug, 2021               

 

                Augmentin 500-125 MG 1 tablet Orally bid for 10 days 25 Aug, 202

1     

 

                          hydroCHLOROthiazide 12.5 MG 1 capsule in the morning O

rally Once a day for 90 

days                                                 

 

                Trazodone HCl 100 MG 1 tablet at bedtime Orally Once a day for 9

0 days                  



Next Appt



                                        Details

 

                                        Provider Name:Michelle Umaña, 2021-10-

05 10:00:00 AM, [address] [facility], 

[phone], [place], [address], [phone]







Insurance Providers





             Payer Name   Payer Address Payer Phone  Insured Name Patient Relati

onship to 

Insured                   Coverage Start Date       Coverage End Date

 

           [facility]        [address]   [facility] [address] [phone] GINGER VANCE self

## 2021-10-27 NOTE — CCD
Summarization Of Episode

                             Created on: 10/27/2021



Xiomara Bains

External Reference #: [id number]

: 1955

Sex: Female



Demographics





                          Address                   [address] [place] [address]

 

                          Home Phone                [phone]

 

                          Preferred Language        English

 

                          Marital Status            Unknown

 

                          Rastafari Affiliation     Unknown

 

                          Race                      White

 

                          Ethnic Group              Not  or 





Author





                          Author                    [facility]

 

                          Organization              [facility]

 

                          Address                   Unknown

 

                          Phone                     Unavailable







Support





                Name            Relationship    Address         Phone

 

                    ITALO HASSAN Next Of Kin         [address], [place]  [id number] [phone]

 

                    RET                 Next Of Kin         RETIRED

Unknown                                 Unavailable

 

                RE              Next Of Kin     Unknown         Unavailable

 

                    NFCU                Next Of Kin         [address] [place] [address]                    [phone]

 

                    FORREST BAINS     Next Of Kin         [address], [place]                     [phone]

 

                    [facility]                 Next Of Kin         [address] 

[place] [address]                    [phone]

 

                    [facility] [employer] [facility] Next Of Kin         [address]

[place] [address]                    [phone]

 

                    JEFFREY EDWARDS   Next Of Kin         [address] [place]

[place] [address]                    [phone]

 

                CONTACT,  NO    Next Of Kin     Unknown         [phone]

 

                    [facility] Next Of Kin         [address] [place] [address]                    [phone]

 

                    BARBRA BAINS     Next Of Kin         [address]

[place] [address]                    [phone]

 

                    [facility] [employer] [facility] Next Of Kin         [address] [address]                    [phone]

 

                    MARIUM BAINS    Next Of Kin         [address] [place] [address]                    [phone]

 

                    Barbra Bains     ECON                [address] [place] [address]                    [phone]







Care Team Providers





                    Care Team Member Name Role                Phone

 

                    SAL Shell MD Unavailable         Unavailable

 

                    SAL Shell MD Unavailable         Unavailable

 

                    SAL Shell MD Unavailable         Unavailable

 

                    SAL Shell MD Unavailable         Unavailable

 

                    SAL Shell MD Unavailable         Unavailable

 

                    SAL Shell MD Unavailable         Unavailable

 

                    SAL Shell MD Unavailable         Unavailable

 

                    SAL Shell MD Unavailable         Unavailable

 

                    SAL Shell MD Unavailable         Unavailable

 

                    SulemanSAL linares MD Unavailable         Unavailable

 

                    SAL Shell MD Unavailable         Unavailable

 

                    SAL Shell MD Unavailable         Unavailable

 

                    SAL Shell MD Unavailable         Unavailable

 

                    SAL Shell MD Unavailable         Unavailable

 

                    SulemanSAL linares MD Unavailable         Unavailable

 

                    SulemanSAL linares MD Unavailable         Unavailable

 

                    SulemanSAL MD Unavailable         Unavailable

 

                    SulemanSAL linares MD Unavailable         Unavailable

 

                    SAL Shell MD Unavailable         Unavailable

 

                    SAL Shell MD Unavailable         Unavailable

 

                    SAL Shell MD Unavailable         Unavailable

 

                    SAL Shell MD Unavailable         Unavailable

 

                    SAL Shell MD Unavailable         Unavailable

 

                    SAL Shell MD Unavailable         Unavailable

 

                    SAL Shell MD Unavailable         Unavailable

 

                    SAL Shell MD Unavailable         Unavailable

 

                    SAL Shell MD Unavailable         Unavailable

 

                    SAL Shell MD Unavailable         Unavailable

 

                    SAL Shell MD Unavailable         Unavailable

 

                    SAL Shell MD Unavailable         Unavailable

 

                    SAL Shell MD Unavailable         Unavailable

 

                    SAL Shell MD Unavailable         Unavailable

 

                    SAL Shell MD Unavailable         Unavailable

 

                    SAL Shell MD Unavailable         Unavailable

 

                    SAL Shell MD Unavailable         Unavailable

 

                    SAL Shell MD Unavailable         Unavailable

 

                    SAL Shell MD Unavailable         Unavailable

 

                    SAL Shell MD Unavailable         Unavailable

 

                    SAL Shell MD Unavailable         Unavailable

 

                    SAL Shell MD Unavailable         Unavailable

 

                    SAL Shell MD Unavailable         Unavailable

 

                    SAL Shell MD Unavailable         Unavailable

 

                    SAL Shell MD Unavailable         Unavailable

 

                    SAL Shell MD Unavailable         Unavailable

 

                    SAL Shell MD Unavailable         Unavailable

 

                    SAL Shell MD Unavailable         Unavailable

 

                    SAL Shell MD Unavailable         Unavailable

 

                    SAL Shell MD Unavailable         Unavailable

 

                    SAL Shell MD Unavailable         Unavailable

 

                    SAL Shell MD Unavailable         Unavailable

 

                    SHARONJEFFERY DOYLE MD   Unavailable         Unavailable

 

                    SHARONJEFFERY DOYLE MD   Unavailable         Unavailable

 

                    SHARONJEFFERY DOYLE MD   Unavailable         Unavailable

 

                    SHARONJEFFERY DOYLE MD   Unavailable         Unavailable

 

                    SHARONJEFFERY DOYLE MD   Unavailable         Unavailable

 

                    SHARONJEFFERY DOYLE MD   Unavailable         Unavailable

 

                    SHARONJEFFERY DOYLE MD   Unavailable         Unavailable

 

                    SHARONJEFFERY DOYLE MD   Unavailable         Unavailable

 

                    SHARONJEFFERY DOYLE MD   Unavailable         Unavailable

 

                    SHARONJEFFERY DOYLE MD   Unavailable         Unavailable

 

                    SHARONJEFFERY DOYLE MD   Unavailable         Unavailable

 

                    SHARONJEFFERY DOYLE MD   Unavailable         Unavailable

 

                    SHARONJEFFERY DOYLE MD   Unavailable         Unavailable

 

                    SHARON,  JEFFERY MD   Unavailable         Unavailable

 

                    SHARON,  JEFFERY MD   Unavailable         Unavailable

 

                    SHARON,  JEFFERY MD   Unavailable         Unavailable

 

                    SHARON,  JEFFERY MD   Unavailable         Unavailable

 

                    SHARON,  JEFFERY MD   Unavailable         Unavailable

 

                    SHARON,  JEFFERY MD   Unavailable         Unavailable

 

                    SHARON,  JEFFERY MD   Unavailable         Unavailable

 

                    SHARON,  JEFFERY MD   Unavailable         Unavailable

 

                    SHARON,  JEFFERY MD   Unavailable         Unavailable

 

                    SHARON,  JEFFERY MD   Unavailable         Unavailable

 

                    SHARON,  JEFFERY MD   Unavailable         Unavailable

 

                    SHARON,  JEFFERY MD   Unavailable         Unavailable

 

                    SHARON,  JEFFERY MD   Unavailable         Unavailable

 

                    SHARON,  JEFFERY MD   Unavailable         Unavailable

 

                    SHARON,  JEFFERY MD   Unavailable         Unavailable

 

                    SHARON,  JEFFERY MD   Unavailable         Unavailable

 

                    SHARON,  JEFFERY MD   Unavailable         Unavailable

 

                    SHARON,  JEFFERY MD   Unavailable         Unavailable

 

                    SHARON,  JEFFERY MD   Unavailable         Unavailable

 

                    SHARON,  JEFFERY MD   Unavailable         Unavailable

 

                    SHARON,  JEFFERY MD   Unavailable         Unavailable

 

                    SHARON,  JEFFERY MD   Unavailable         Unavailable

 

                    SHARON,  JEFFERY MD   Unavailable         Unavailable

 

                    SHARON,  JEFFERY MD   Unavailable         Unavailable

 

                    SHARON,  JEFFERY MD   Unavailable         Unavailable

 

                    SHARON,  JEFFERY MD   Unavailable         Unavailable

 

                    SHARON,  JEFFERY MD   Unavailable         Unavailable

 

                    SHARON,  JEFFERY MD   Unavailable         Unavailable

 

                    SHARON,  JEFFERY MD   Unavailable         Unavailable

 

                    SHARON,  JEFFERY MD   Unavailable         Unavailable

 

                    Kirby Chao MD    Unavailable         Unavailable

 

                    Kirby Chao MD    Unavailable         Unavailable

 

                    Kirby Chao MD    Unavailable         Unavailable

 

                    Kirby Chao MD    Unavailable         Unavailable

 

                    Kirby Chao MD    Unavailable         Unavailable

 

                    Kirby Chao MD    Unavailable         Unavailable

 

                    Kirby Chao MD    Unavailable         Unavailable

 

                    Kirby Chao MD    Unavailable         Unavailable

 

                    Kirby Chao MD    Unavailable         Unavailable

 

                    AdinKirby mcgill MD    Unavailable         Unavailable

 

                    Kirby Chao MD    Unavailable         Unavailable

 

                    Kirby Chao MD    Unavailable         Unavailable

 

                    Kirby Chao MD    Unavailable         Unavailable

 

                    Kirby Chao MD    Unavailable         Unavailable

 

                    Kirby Chao MD    Unavailable         Unavailable

 

                    Kirby Chao MD    Unavailable         Unavailable

 

                    Kirby Chao MD    Unavailable         Unavailable

 

                    AdinKirby MD    Unavailable         Unavailable

 

                    AdinKirby MD    Unavailable         Unavailable

 

                    Kirby Chao MD    Unavailable         Unavailable

 

                    Kirby Chao MD    Unavailable         Unavailable

 

                    Kirby Chao MD    Unavailable         Unavailable

 

                    Kirby Chao MD    Unavailable         Unavailable

 

                    Kirby Chao MD    Unavailable         Unavailable

 

                    Kirby Chao MD    Unavailable         Unavailable

 

                    Kirby Chao MD    Unavailable         Unavailable

 

                    Kirby Chao MD    Unavailable         Unavailable

 

                    Kirby Chao MD    Unavailable         Unavailable

 

                    Kirby Chao MD    Unavailable         Unavailable

 

                    Kirby Chao MD    Unavailable         Unavailable

 

                    Patricia Martin MD Unavailable         Unavailable

 

                    Patricia Martin MD Unavailable         Unavailable

 

                    Patricia Martin MD Unavailable         Unavailable

 

                    Veronica, Patricia Olivera MD Unavailable         Unavailable

 

                    Veronica, Patricia Olivera MD Unavailable         Unavailable

 

                    Veronica, Patricia Olivera MD Unavailable         Unavailable

 

                    Veronica, Patricia Olivera MD Unavailable         Unavailable

 

                    Veronica, Patricia Olivera MD Unavailable         Unavailable

 

                    Veronica, Patricia Olivera MD Unavailable         Unavailable

 

                    Veronica, Patricia Olivera MD Unavailable         Unavailable

 

                    Veronica, Patricia Olivera MD Unavailable         Unavailable

 

                    Veronica, Patricia Olivera MD Unavailable         Unavailable

 

                    Veronica, Patricia Olivera MD Unavailable         Unavailable

 

                    Veronica, Patricia Olivera MD Unavailable         Unavailable

 

                    Veronica, Patricia Olivera MD Unavailable         Unavailable

 

                    Veronica, Patricia Olivera MD Unavailable         Unavailable

 

                    Veronica, Patricia Olivera MD Unavailable         Unavailable

 

                    Veronica, Patricia Olivera MD Unavailable         Unavailable

 

                    Veronica, Patricia Olivera MD Unavailable         Unavailable

 

                    Veronica, Patricia Olivera MD Unavailable         Unavailable

 

                    Veronica, Patricia Olivera MD Unavailable         Unavailable

 

                    Veronica, Patricia Olivera MD Unavailable         Unavailable

 

                    Veronica, Patricia Olivera MD Unavailable         Unavailable

 

                    Veronica, Patricia Olivera MD Unavailable         Unavailable

 

                    Veronica, Patricia Olivera MD Unavailable         Unavailable

 

                    Veronica, Patricia Olivera MD Unavailable         Unavailable

 

                    Veronica, Patricia Olivera MD Unavailable         Unavailable

 

                    Veronica, Patricia Olivera MD Unavailable         Unavailable

 

                    Veronica, Patricia Olivera MD Unavailable         Unavailable

 

                    Veronica, Patricia Olivera MD Unavailable         Unavailable

 

                    Veroniac, Patricia Olivera MD Unavailable         Unavailable

 

                    Veronica, Patricia Olivera MD Unavailable         Unavailable

 

                    Veronica, Patricia Olivera MD Unavailable         Unavailable

 

                    Veronica, Patricia Olivera MD Unavailable         Unavailable

 

                    Veronica, Patricia Olivera MD Unavailable         Unavailable

 

                    Veronica, Patricia Olivera MD Unavailable         Unavailable

 

                    Veronica, Patricia Olivera MD Unavailable         Unavailable

 

                    Veronica, Patricia Olivera MD Unavailable         Unavailable

 

                    Veronica, Patricia Olivera MD Unavailable         Unavailable

 

                    Veronica, Patricia Olivera MD Unavailable         Unavailable

 

                    Veronica, Patricia Olivera MD Unavailable         Unavailable

 

                    Veronica, Patricia Olivera MD Unavailable         Unavailable

 

                    Veronica, Patricia Olivera MD Unavailable         Unavailable

 

                    Veronica, Patricia Olivera MD Unavailable         Unavailable

 

                    Veronica, Patricia Olivera MD Unavailable         Unavailable

 

                    Veronica, Patricia Olivear MD Unavailable         Unavailable

 

                    Veronica, Patricia Olivera MD Unavailable         Unavailable

 

                    Veronica, Patricia Olivera MD Unavailable         Unavailable

 

                    Veronica, Patricia Olivera MD Unavailable         Unavailable

 

                    Veronica, Patricia Olivera MD Unavailable         Unavailable

 

                    Veronica, Patricia Olivera MD Unavailable         Unavailable

 

                    Veronica, Patricia Olivera MD Unavailable         Unavailable

 

                    Veronica, Patricia Olivera MD Unavailable         Unavailable

 

                    Veronica, Patricia Olivera MD Unavailable         Unavailable

 

                    Veronica, Patricia Olivera MD Unavailable         Unavailable

 

                    Veronica, Patricia Olivera MD Unavailable         Unavailable

 

                    Veronica, Patricia Olivera MD Unavailable         Unavailable

 

                    Veronica, Patricia Olivera MD Unavailable         Unavailable

 

                    Veronica, Patricia Olivera MD Unavailable         Unavailable

 

                    Veronica, Patricia Olivera MD Unavailable         Unavailable

 

                    Veronica, Patricia Olivera MD Unavailable         Unavailable

 

                    Veronica, Patricia Olivera MD Unavailable         Unavailable

 

                    Veronica, Patricia Olivera MD Unavailable         Unavailable

 

                    Veronica, Patricia Olivera MD Unavailable         Unavailable

 

                    Veronica, Patricia Olivera MD Unavailable         Unavailable

 

                    Veronica, Patricia Olivera MD Unavailable         Unavailable

 

                    Veronica, Patricia Olivera MD Unavailable         Unavailable

 

                    Veronica, Patricia Olivera MD Unavailable         Unavailable

 

                    Veronica, Patricia Olivera MD Unavailable         Unavailable

 

                    Veronica, Patricia Olivera MD Unavailable         Unavailable

 

                    Veronica, Patricia Olivera MD Unavailable         Unavailable

 

                    Veronica, Patricia Olivera MD Unavailable         Unavailable

 

                    Martinez, A Kailee PA Unavailable         Unavailable

 

                    Martinez, A Kailee PA Unavailable         Unavailable

 

                    Martinez, A Kailee PA Unavailable         Unavailable

 

                    Martinez, A Kailee PA Unavailable         Unavailable

 

                    Martinez, A Kailee PA Unavailable         Unavailable

 

                    Martinez, A Kailee PA Unavailable         Unavailable

 

                    Martinez, A Kailee PA Unavailable         Unavailable

 

                    Martinez, A Kailee PA Unavailable         Unavailable

 

                    Martinez, A Kailee PA Unavailable         Unavailable

 

                    Martinez, A Kailee PA Unavailable         Unavailable

 

                    Martinez, A Kailee PA Unavailable         Unavailable

 

                    Martinez, A Kailee PA Unavailable         Unavailable

 

                    Martinez, A Kailee PA Unavailable         Unavailable

 

                    Martinez, A Kailee PA Unavailable         Unavailable

 

                    Martinez, A Kailee PA Unavailable         Unavailable

 

                    Martinez, A Kailee PA Unavailable         Unavailable

 

                    Martinez, A Kailee PA Unavailable         Unavailable

 

                    Martinez, A Kailee PA Unavailable         Unavailable

 

                    Martinez, A Kailee PA Unavailable         Unavailable

 

                    Martinez, A Kailee PA Unavailable         Unavailable

 

                    Martinez, A Kailee PA Unavailable         Unavailable

 

                    Martinez, A Kailee PA Unavailable         Unavailable

 

                    Martinez, A Kailee PA Unavailable         Unavailable

 

                    Martinez, A Kailee PA Unavailable         Unavailable

 

                    Martinez, A Kailee PA Unavailable         Unavailable

 

                    Martinez, A Kailee PA Unavailable         Unavailable

 

                    Martinez, A Kailee PA Unavailable         Unavailable

 

                    Martinez, A Kailee PA Unavailable         Unavailable

 

                    Martinez, A Kailee PA Unavailable         Unavailable

 

                    Martinez, A Kailee PA Unavailable         Unavailable

 

                    Martinez, A Kailee PA Unavailable         Unavailable

 

                    Martinez, A Kailee PA Unavailable         Unavailable

 

                    Martinez, A Kailee PA Unavailable         Unavailable

 

                    Martinez, A Kailee PA Unavailable         Unavailable

 

                    Martinez, A Kailee PA Unavailable         Unavailable

 

                    Martinez, A Kailee PA Unavailable         Unavailable

 

                    Martinez, A Kailee PA Unavailable         Unavailable

 

                    Martinez, A Kailee PA Unavailable         Unavailable

 

                    Martinez, A Kailee PA Unavailable         Unavailable

 

                    Martinez, A Kailee PA Unavailable         Unavailable

 

                    Martinez, A Kailee PA Unavailable         Unavailable

 

                    Martinez, A Kailee PA Unavailable         Unavailable

 

                    LENNY Brasher MD Unavailable         Unavailable

 

                    LENNY Brasher MD Unavailable         Unavailable

 

                    LENNY Brasher MD Unavailable         Unavailable

 

                    LENNY Brasher MD Unavailable         Unavailable

 

                    LENNY Brasher MD Unavailable         Unavailable

 

                    LENNY Brasher MD Unavailable         Unavailable

 

                    LENNY Brasher MD Unavailable         Unavailable

 

                    LENNY Brasher MD Unavailable         Unavailable

 

                    LENNY Brasher MD Unavailable         Unavailable

 

                    LENNY Brasher MD Unavailable         Unavailable

 

                    LENNY Brasher MD Unavailable         Unavailable

 

                    LENNY Brasher MD Unavailable         Unavailable

 

                    LENNY Brasher MD Unavailable         Unavailable

 

                    LENNY Brasher MD Unavailable         Unavailable

 

                    LENNY Brasher MD Unavailable         Unavailable

 

                    LENNY Brasher MD Unavailable         Unavailable

 

                    LENNY Brasher MD Unavailable         Unavailable

 

                    LENNY Brasher MD Unavailable         Unavailable

 

                    LENNY Brasher MD Unavailable         Unavailable

 

                    LENNY Brasher MD Unavailable         Unavailable

 

                    LENNY Brasher MD Unavailable         Unavailable

 

                    LENNY Brasher MD Unavailable         Unavailable

 

                    LENNY Brasher MD Unavailable         Unavailable

 

                    LENNY Brasher MD Unavailable         Unavailable

 

                    LENNY Brasher MD Unavailable         Unavailable

 

                    SILVINO Dos Santos MD Unavailable         Unavailable

 

                    SILVINO Dos Santos MD Unavailable         Unavailable

 

                    SILVINO Dos Santos MD Unavailable         Unavailable

 

                    SILVINO Dos Santos MD Unavailable         Unavailable

 

                    SILVINO Dos Santos MD Unavailable         Unavailable

 

                    SILVINO Dos Santos MD Unavailable         Unavailable

 

                    SILVINO Dos Santos MD Unavailable         Unavailable

 

                    SILVINO Dos Santos MD Unavailable         Unavailable

 

                    SILVINO Dos Santos MD Unavailable         Unavailable

 

                    SILVINO Dos Santos MD Unavailable         Unavailable

 

                    SILVINO Dos Santos MD Unavailable         Unavailable

 

                    SILVINO Dos Santos MD Unavailable         Unavailable

 

                    SILVINO Dos Santos MD Unavailable         Unavailable

 

                    SILVINO Dos Santos MD Unavailable         Unavailable

 

                    SILVINO Dos Santos MD Unavailable         Unavailable

 

                    SILVINO Dos Santos MD Unavailable         Unavailable

 

                    SILVINO Dos Santos MD Unavailable         Unavailable

 

                    SILVINO Dos Santos MD Unavailable         Unavailable

 

                    SILVINO Dos Santos MD Unavailable         Unavailable

 

                    SILVINO Dos Santos MD Unavailable         Unavailable

 

                    SILVINO Dos Santos MD Unavailable         Unavailable

 

                    SILVINO Dos Santos MD Unavailable         Unavailable

 

                    SILVINO Dos Santos MD Unavailable         Unavailable

 

                    SILVINO Dos Santos MD Unavailable         Unavailable

 

                    SILVINO Dos Santos MD Unavailable         Unavailable

 

                    SILVINO Dos Santos MD Unavailable         Unavailable

 

                    SILVINO Dos Santos MD Unavailable         Unavailable

 

                    SILVINO Dos Santos MD Unavailable         Unavailable

 

                    SILVINO Dos Santos MD Unavailable         Unavailable

 

                    SILVINO Dos Santos MD Unavailable         Unavailable

 

                    SILVINO Dos Santos MD Unavailable         Unavailable

 

                    SILVINO Dos Santos MD Unavailable         Unavailable

 

                    SILVINO Dos Santos MD Unavailable         Unavailable

 

                    SILVINO Dos Santos MD Unavailable         Unavailable

 

                    SILVINO Dos Santos MD Unavailable         Unavailable

 

                    SILVINO Dos Santos MD Unavailable         Unavailable

 

                    SILVINO Dos Santos MD Unavailable         Unavailable

 

                    SILVINO Dos Santos MD Unavailable         Unavailable

 

                    SILVINO Dos Santos MD Unavailable         Unavailable

 

                    SILVINO Dos Santos MD Unavailable         Unavailable

 

                    SILVINO Dos Santos MD Unavailable         Unavailable

 

                    SILVINO Dos Santos MD Unavailable         Unavailable

 

                    SILVINO Dos Santos MD Unavailable         Unavailable

 

                    SILVINO Dos Santos MD Unavailable         Unavailable

 

                    SILVINO Dos Santos MD Unavailable         Unavailable

 

                    SILVINO Dos Santos MD Unavailable         Unavailable

 

                    SILVINO Dos Santos MD Unavailable         Unavailable

 

                    SILVINO Dos Santos MD Unavailable         Unavailable

 

                    SILVINO Dos Santos MD Unavailable         Unavailable

 

                    SILVINO Dos Santos MD Unavailable         Unavailable

 

                    SILVINO Dos Santos MD Unavailable         Unavailable

 

                    SILVINO Dos Santos MD Unavailable         Unavailable

 

                    SILVINO Dos Santos MD Unavailable         Unavailable

 

                    SILVINO Dos Santos MD Unavailable         Unavailable

 

                    SILVINO Dos Santos MD Unavailable         Unavailable

 

                    SILVINO Dos Santos MD Unavailable         Unavailable

 

                    SILVINO Dos Santos MD Unavailable         Unavailable

 

                    SILVINO Dos Santos MD Unavailable         Unavailable

 

                    SILVINO Dos Santos MD Unavailable         Unavailable

 

                    SILVINO Dos Santos MD Unavailable         Unavailable

 

                    SILVINO Dos Santos MD Unavailable         Unavailable

 

                    SILVINO Dos Santos MD Unavailable         Unavailable

 

                    SILVINO Dos Santos MD Unavailable         Unavailable

 

                    ISLVINO Dos Santos MD Unavailable         Unavailable

 

                    SILVINO Dos Santos MD Unavailable         Unavailable

 

                    SILVINO Dos Santos MD Unavailable         Unavailable

 

                    SILVINO Dos Santos MD Unavailable         Unavailable

 

                    SILVINO Dos Santos MD Unavailable         Unavailable

 

                    SILVINO Dos Santos MD Unavailable         Unavailable

 

                    Raya, M Karen MINA Unavailable         Unavailable

 

                    Raya, M Karen MINA Unavailable         Unavailable

 

                    Raya, M Karen MINA Unavailable         Unavailable

 

                    Raya, M Karen MINA Unavailable         Unavailable

 

                    Raya, M Karen MINA Unavailable         Unavailable

 

                    Raya, M Karen MINA Unavailable         Unavailable

 

                    Potter, M Luzma NP Unavailable         Unavailable

 

                    Potter, M Luzma NP Unavailable         Unavailable

 

                    Potter, M Luzma NP Unavailable         Unavailable

 

                    Potter, M Luzma NP Unavailable         Unavailable

 

                    Potter, M Luzma NP Unavailable         Unavailable

 

                    Potter, M Luzma NP Unavailable         Unavailable

 

                    Potter, M Luzma NP Unavailable         Unavailable

 

                    Potter, M Luzma NP Unavailable         Unavailable

 

                    Potter, M Luzma NP Unavailable         Unavailable

 

                    Potter, M Luzma NP Unavailable         Unavailable

 

                    Potter, M Luzma NP Unavailable         Unavailable

 

                    Potter, M Luzma NP Unavailable         Unavailable

 

                    Potter, M Luzma NP Unavailable         Unavailable

 

                    Potter, M Luzma NP Unavailable         Unavailable

 

                    Potter, M Luzma NP Unavailable         Unavailable

 

                    Potter, M Luzma NP Unavailable         Unavailable

 

                    Potter, M Luzma NP Unavailable         Unavailable

 

                    Potter, M Luzma NP Unavailable         Unavailable

 

                    Potter, M Luzma NP Unavailable         Unavailable

 

                    Potter, M Luzma NP Unavailable         Unavailable

 

                    Potter, M Luzma NP Unavailable         Unavailable

 

                    Potter, M Luzma NP Unavailable         Unavailable

 

                    Potter, M Luzma NP Unavailable         Unavailable

 

                    Potter, M Luzma NP Unavailable         Unavailable

 

                    Potter, M Luzma NP Unavailable         Unavailable

 

                    Potter, M Luzma NP Unavailable         Unavailable

 

                    Potter, M Luzma NP Unavailable         Unavailable

 

                    Potter, M Luzma NP Unavailable         Unavailable

 

                    Potter, M Luzma NP Unavailable         Unavailable

 

                    Potter, M Luzma NP Unavailable         Unavailable

 

                    Potter, M Luzma NP Unavailable         Unavailable

 

                    Potter, M Luzma NP Unavailable         Unavailable

 

                    Potter, M Luzma NP Unavailable         Unavailable

 

                    Potter, M Luzma NP Unavailable         Unavailable

 

                    Potter, M Luzma NP Unavailable         Unavailable

 

                    Potter, M Luzma NP Unavailable         Unavailable

 

                    Potter, M Luzma NP Unavailable         Unavailable

 

                    Potter, M Luzma NP Unavailable         Unavailable

 

                    Potter, M Luzma NP Unavailable         Unavailable

 

                    Potter, M Luzma NP Unavailable         Unavailable

 

                    Potter, M Luzma NP Unavailable         Unavailable

 

                    Potter, M Luzma NP Unavailable         Unavailable

 

                    Potter, M Luzma NP Unavailable         Unavailable

 

                    Potter, M Luzma NP Unavailable         Unavailable

 

                    Potter, M Luzma NP Unavailable         Unavailable

 

                    Potter, M Luzma NP Unavailable         Unavailable

 

                    Potter, M Luzma NP Unavailable         Unavailable

 

                    Potter, M Luzma NP Unavailable         Unavailable

 

                    Potter, M Luzma NP Unavailable         Unavailable

 

                    Potter, M Luzma NP Unavailable         Unavailable

 

                    Potter, M Luzma NP Unavailable         Unavailable

 

                    Potter, M Luzma NP Unavailable         Unavailable

 

                    Potter, M Luzma NP Unavailable         Unavailable

 

                    Potter, M Luzma NP Unavailable         Unavailable

 

                    Potter, M Luzma NP Unavailable         Unavailable

 

                    Potter, M Luzma NP Unavailable         Unavailable

 

                    Potter, M Luzma NP Unavailable         Unavailable

 

                    Potter, M Luzma NP Unavailable         Unavailable



                                  



Re-disclosure Warning

          The records that you are about to access may contain information from 
federally-assisted alcohol or drug abuse programs. If such information is 
present, then the following federally mandated warning applies: This information
has been disclosed to you from records protected by federal confidentiality 
rules (42 CFR part 2). The federal rules prohibit you from making any further 
disclosure of this information unless further disclosure is expressly permitted 
by the written consent of the person to whom it pertains or as otherwise 
permitted by 42 CFR part 2. A general authorization for the release of medical 
or other information is NOT sufficient for this purpose. The Federal rules 
restrict any use of the information to criminally investigate or prosecute any 
alcohol or drug abuse patient.The records that you are about to access may 
contain highly sensitive health information, the redisclosure of which is 
protected by Article 27-F of the [facility] law. If you 
continue you may have access to information: Regarding HIV / AIDS; Provided by 
facilities licensed or operated by the [facility] of Mental Health; 
or Provided by the [facility] Office for People With Developmental 
Disabilities. If such information is present, then the following [place] 
mandated warning applies: This information has been disclosed to you from 
confidential records which are protected by state law. State law prohibits you 
from making any further disclosure of this information without the specific 
written consent of the person to whom it pertains, or as otherwise permitted by 
law. Any unauthorized further disclosure in violation of state law may result in
a fine or [facility] sentence or both. A general authorization for the release of 
medical or other information is NOT sufficient authorization for further disc
losure.                                                                         
    



Family History

          



             Family Member Name Family Member Gender Family Member Status Date o

f Status 

Description                             Data Source(s)

 

           Unknown    Male       Problem                          [facility] ([facility]

 

                                        () 

 

           Unknown    Female     Problem                          [facility] ([facility]

 

           Unknown    Male       Problem                          [facility] ([facility])



                                                                                
                           



Encounters

          



           Encounter  Providers  Location   Date       Indications Data Source(s

)

 

           Outpatient Attender: Karen Dos Santos MD            [facility]

 

                [facility]      Attender: Kirby Singh/Jeremie/Donis/Choco hidalgo 10/26/2021 02:10:00

PM EDT                                              [facility] ([facility]

 

                Outpatient      Attender: JEFFERY HERRERA MD Main office [facility] 10/15/2021 08:45:00

AM EDT                                              [facility] ([facility]

 

                Outpatient                      [address]

[place] [address] 10/05/2021 12:00:00 AM

EDT                                                 [email or web address]

[facility])

 

                Outpatient      Attender: Kailee GARDUNO [id number]    12:00:00 AM EDT 

- [facility]:36:[facility]

 

                Unknown                         [address]

[place] [address] 2021 12:00:00 AM 

EDT                                                 [facility]

[facility])

 

             Outpatient   Attender: Quique Shell MD Main Office  2021 

03:15:00 PM EDT 

                                        [facility] ([facility])

 

                Unknown                         [address]

[place] [address] 2021 12:00:00 AM 

EDT                                                 [facility]

[facility])

 

                Outpatient      Attender: Kirby iSngh/Jeremie/Donis/Choco hidalgo 08/10/2021 09:10:00

AM EDT                                              [facility]

 

           Outpatient Attender: Kailee GARDUNO            2021 12:00:00

 AM [facility]

 

                Outpatient      Attender: Kirby Singh/Jeremie/Donis/Choco hidalgo 2021 10:30:00

AM EDT                                              [facility]

 

                Outpatient      Attender: JEFFERY HERRERA MD Main [facility] 07/15/2021 03:30:00

PM EDT                                              [facility] ([facility]

[facility])

 

           Outpatient Attender: Karen Dos Santos MD            2021 12:00:0

0 AM EDT            [facility]

 

                Outpatient                      [address]

[place] [address] 2021 12:00:00 AM

EDT                                                 [facility]

[facility])

 

           Outpatient Attender: Luzma Johnson NP            2021 09:21:0

0 AM EDT Xray       [facility]

 

                                        Xray 

 

                Outpatient      Attender: Kailee GARDUNO [id number]    12:00:00 AM EDT 

- 2021 11:20:37 AM T                           [facility]

 

                Outpatient      Attender: Joselin Martin MDReferrer: Joselin Martin MD  

               2021 12:00:00 

AM EDT                    Unspecified lump in the right breast, unspecified quad

[facility]

 

                                        Unspecified lump in the right breast, un

specified quadrant 

 

                Outpatient      Attender: Kirby Singh/Jeremie/Donis/Choco hidalgo 06/10/2021 07:20:00

AM EDT                                              [facility]

 

                Unknown                         [address]

[place] [address] 06/10/2021 12:00:00 AM 

EDT                                                 [facility]

 

                Outpatient                      [address]

[place] [address] 2021 12:00:00 AM

EDT                                                 [facility]

[facility])

 

                Unknown                         [address]

[place] [address] 2021 12:00:00 AM 

EDT                                                 [facility]

[facility])

 

                Outpatient                      [address]

[place] [address] 2021 12:00:00 AM

EDT                                                 [facility]

[facility])

 

                Unknown                         [address]

[place] [address] 2021 12:00:00 AM 

EDT                                                 [facility]

[facility])

 

                Unknown                         [address]

[place] [address] 2021 12:00:00 AM 

EDT                                                 [facility]

[facility])

 

                Unknown                         [address]

[place] [address] 2021 12:00:00 AM 

EDT                                                 [facility]CW[facility]

[facility] Center)

 

                Unknown                         [address]

[place] [address] 2021 12:00:00 AM 

EDT                                                 [facility]

[facility])

 

                Outpatient                      [address], 

[place] [address] 2021 12:00:00 AM

EST                                                 [facility]

[facility])

 

                Outpatient      Attender: Mell Brasher MD                 0

3/04/2021 06:08:27 PM EST - 

2021 07:29:42 PM EST                           [facility] ([facility]

e)

 

                Outpatient      Attender: JEFFERY HERRERA MD [facility] office - [facility]

n 2021 08:30:00

AM EST                                              [facility] ([facility]

[facility])

 

                Unknown                         [address]

[place] [address] 2021 12:00:00 AM 

EST                                                 [facility])

 

                Outpatient      Attender: JEFFERY HERRERA MD Main office - [facility]

n 2021 12:00:00

PM EST                                              [facility] ([facility]

fani, )

 

                Outpatient      Attender: Kailee GARDUNO [id number]   01/1

3/2021 12:00:00 AM EST 

- 2021 02:31:34 PM EST Localized enlarged lymph nodes [facility]

 

                                        Localized enlarged lymph nodes 

 

                Outpatient                      [address]

[place] [address] 2021 12:00:00 AM

EST                                                 [facility]

[facility])

 

                Unknown                         [address]

[place] [address] 2020 12:00:00 AM 

EST                                                 eCW1 (Replaced by [facility])

 

           Outpatient Attender: Kailee GARDUNO            2020 12:00:00

 AM [facility]

 

           Outpatient Attender: Kailee GARDUNO            2020 12:00:00

 AM [facility]

 

                Outpatient                      [address]

[place] [address] 2020 12:00:00 AM

EST                                                 eCW1 (Replaced by [facility])

 

                Outpatient                      [address]

[place] [address] 10/29/2020 12:00:00 AM

EDT                                                 eCW1 (Replaced by [facility])

 

                Outpatient      Attender: Karen Dos Santos MD [id number]    12:00:00 AM EDT

- 2020 10:26:01 AM EDT Family history of malignant neoplasm of breast 

[facility]

 

                                        Family history of malignant neoplasm of 

breast 



                                                                                
                                                                                
                                                                                
                                                                                
                                                                                
                                                               



Immunizations

          



             Vaccine      Date         Status       Description  Data Source(s)

 

                          influenza, recombinant, quadrIvalent,injectable, prese

rvative free 10/05/2021 

10:22:00 AM EDT     completed                               eCW1 ([facility])

 

             COVID-19 VACCINE Pfizer 2021 12:00:00 AM EDT completed       

          [facility]

 

                                        Vaccine Series Complete: YESThis Data wa

s Submitted to [facility] Via [employer]. 

 

             COVID-19 VACCINE Pfizer 2021 12:00:00 AM EST completed       

          [facility]

 

                                        Vaccine Series Complete: NOThis Data was

 Submitted to [facility] Via [employer]. 

 

                          influenza, recombinant, quadrIvalent,injectable, prese

rvative free 10/29/2020 

05:17:00 PM EDT     completed                               eCW1 ([facility])

 

                          influenza, recombinant, quadrIvalent,injectable, prese

rvative free 10/29/2020 

05:17:00 PM EDT     completed                               eCW1 ([facility])

 

                          influenza, recombinant, quadrIvalent,injectable, prese

rvative free 10/29/2020 

05:17:00 PM EDT     completed                               eCW1 ([facility])

 

                          influenza, recombinant, quadrIvalent,injectable, prese

rvative free 10/29/2020 

05:17:00 PM EDT     completed                               eCW1 ([facility])

 

                          influenza, recombinant, quadrIvalent,injectable, prese

rvative free 10/29/2020 

05:17:00 PM EDT     completed                               eCW1 ([facility])

 

                          influenza, recombinant, quadrIvalent,injectable, prese

rvative free 10/29/2020 

05:17:00 PM EDT     completed                               eCW1 ([facility])

 

                          influenza, recombinant, quadrIvalent,injectable, prese

rvative free 10/29/2020 

05:17:00 PM EDT     completed                               eCW1 ([facility])

 

                          influenza, recombinant, quadrIvalent,injectable, prese

rvative free 10/29/2020 

05:17:00 PM EDT     completed                               eCW1 ([facility])

 

                          influenza, recombinant, quadrIvalent,injectable, prese

rvative free 10/29/2020 

05:17:00 PM EDT     completed                               eCW1 ([facility])

 

                          influenza, recombinant, quadrIvalent,injectable, prese

rvative free 10/29/2020 

05:17:00 PM EDT     completed                               eCW1 ([facility])

 

                          influenza, recombinant, quadrIvalent,injectable, prese

rvative free 10/29/2020 

05:17:00 PM EDT     completed                               eCW1 ([facility])

 

                          influenza, recombinant, quadrIvalent,injectable, prese

rvative free 10/29/2020 

05:17:00 PM EDT     completed                               eCW1 ([facility])

 

                          influenza, recombinant, quadrIvalent,injectable, prese

rvative free 10/29/2020 

05:17:00 PM EDT     completed                               eCW1 ([facility])

 

                          influenza, recombinant, quadrIvalent,injectable, prese

rvative free 10/29/2020 

05:17:00 PM EDT     completed                               eCW1 ([facility])

 

                          influenza, recombinant, quadrIvalent,injectable, prese

rvative free 10/29/2020 

05:17:00 PM EDT     completed                               eCW1 ([facility])

 

                          influenza, recombinant, quadrIvalent,injectable, prese

rvative free 10/29/2020 

05:17:00 PM EDT     completed                               eCW1 ([facility])

 

                          influenza, recombinant, quadrIvalent,injectable, prese

rvative free 10/29/2020 

05:17:00 PM EDT     completed                               eCW1 ([facility])

 

                          influenza, recombinant, quadrIvalent,injectable, prese

rvative free 10/29/2020 

05:17:00 PM EDT     completed                               eCW1 ([facility])



                                                                                
                                                                                
                                                                                
                                             



Medications

          



          Medication Brand Name Start Date Product Form Dose      Route     Admi

nistrative 

Instructions Pharmacy Instructions Status     Indications Reaction   Description

 Data 

Source(s)

 

                    tramadol hydrochloride 50 MG Oral Tablet traMADol HCl 50 MG 

traMADol HCl 50 MG  

10/05/2021 12:00:00 AM EDT         1.0 {tablet_as_needed}                       

  active                  traMADol 

HCl 50 MG                               eCW1 ([facility])

 

          50 mg               10/05/2021 12:00:00 AM EDT tablet    21           

       TAKE ONE TABLET BY MOUTH THREE 

TIMES A DAY AS NEEDED MAXIMUM DAILY DOSE = 3 TAKE ONE TABLET BY MOUTH THREE 

TIMES A DAY AS NEEDED MAXIMUM DAILY DOSE = 3 SOLD: 10/06/2021                   

                     [facility]

 

          0.025 % (0.035 %)           09/10/2021 12:00:00 AM EDT drops     5    

               INSTILL ONE DROP INTO 

BOTH EYES TWO TIMES A DAY AS NEEDED FOR ITCHING INSTILL ONE DROP INTO BOTH EYES 

TWO TIMES A DAY AS NEEDED FOR ITCHING SOLD: 2021                          

              [facility]

 

          1.479-0.188- 0.225 gram           2021 12:00:00 AM EDT tablet   

 24                  USE AS DIRECTED

           USE AS DIRECTED SOLD: 2021                                  [facility]

 

     Sutab Sutab 2021 12:00:00 AM EDT                          active     

           [facility] ([facility]

 

          150 mg              2021 12:00:00 AM EDT tablet    2            

       TAKE ONE TABLET BY MOUTH NOW AND 

REPEAT IN 10 DAYS         TAKE ONE TABLET BY MOUTH NOW AND REPEAT IN 10 DAYS SOL

D: 

2021                                                      [facility]

 

                          Amoxicillin 500 MG / Clavulanate 125 MG Oral Tablet 50

0-125 mg 

AMOXICILLIN/POTASSIUM CLAV 2021 12:00:00 AM EDT tablet       20           

             TAKE ONE 

TABLET BY MOUTH TWICE A DAY FOR 10 DAYS TAKE ONE TABLET BY MOUTH TWICE A DAY FOR

10 DAYS      SOLD: 2021                                        [facility]

 

                                        Amoxicillin 500 MG / Clavulanate 125 MG 

Oral Tablet [Augmentin] Augmentin 500-

125 MG  Augmentin 500-125 MG 2021 12:00:00 AM EDT         1.0 {tablet}    

                     

active                                          Augmentin 500-125 MG eCW1 ([facility])

 

                    Fluconazole 150 MG Oral Tablet [Diflucan] Diflucan 150 MG Di

flucan 150 MG     

2021 12:00:00 AM EDT        1.0 {tablet}                      active      

         Diflucan 150 MG eCW1 

([facility])

 

                    Fluconazole 150 MG Oral Tablet [Diflucan] Diflucan 150 MG Di

flucan 150 MG     

2021 12:00:00 AM EDT        1.0 {tablet}                      active      

         Diflucan 150 MG eCW1 

([facility])

 

                                        Amoxicillin 500 MG / Clavulanate 125 MG 

Oral Tablet [Augmentin] Augmentin 500-

125 MG  Augmentin 500-125 MG 2021 12:00:00 AM EDT         1.0 {tablet}    

                     

active                                          Augmentin 500-125 MG eCW1 ([facility])

 

                    Trazodone Hydrochloride 100 MG Oral Tablet TRAZODONE HCL    

   2021 12:00:00 AM 

EDT          tablet       30                        TAKE ONE TABLET BY MOUTH AT 

BEDTIME TAKE ONE TABLET BY MOUTH AT 

BEDTIME      SOLD: 2021                                        [facility]

 

                    Trazodone Hydrochloride 100 MG Oral Tablet TRAZODONE HCL    

   2021 12:00:00 AM 

EDT          tablet       30                        TAKE ONE TABLET BY MOUTH AT 

BEDTIME TAKE ONE TABLET BY MOUTH AT 

BEDTIME      SOLD: 2021                                        [facility]

 

                    Trazodone Hydrochloride 100 MG Oral Tablet TRAZODONE HCL    

   2021 12:00:00 AM 

EDT          tablet       30                        TAKE ONE TABLET BY MOUTH AT 

BEDTIME TAKE ONE TABLET BY MOUTH AT 

BEDTIME      SOLD: 2021                                        [facility]

 

                    Trazodone Hydrochloride 100 MG Oral Tablet TRAZODONE HCL    

   2021 12:00:00 AM 

EDT          tablet       30                        TAKE ONE TABLET BY MOUTH AT 

BEDTIME TAKE ONE TABLET BY MOUTH AT 

BEDTIME      SOLD: 10/05/2021                                        [facility]

 

          50 mg               2021 12:00:00 AM EDT tablet    30           

       TAKE ONE TABLET BY MOUTH EVERY 

DAY AT BEDTIME  TAKE ONE TABLET BY MOUTH EVERY DAY AT BEDTIME SOLD: 2021  

               

                                                    [facility]

 

          50 mg               2021 12:00:00 AM EDT tablet    30           

       TAKE ONE TABLET BY MOUTH EVERY 

DAY AT BEDTIME  TAKE ONE TABLET BY MOUTH EVERY DAY AT BEDTIME SOLD: 2021  

               

                                                    [facility]

 

          5 mg                2021 12:00:00 AM EDT tablet    60           

       TAKE ONE TABLET BY MOUTH TWICE A 

DAY        TAKE ONE TABLET BY MOUTH TWICE A DAY SOLD: 2021                

                  [facility]

 

          12.5 mg             2021 12:00:00 AM EDT capsule   30           

       TAKE ONE CAPSULE BY MOUTH 

EVERY DAY IN THE MORNING  TAKE ONE CAPSULE BY MOUTH EVERY DAY IN THE MORNING 

SOLD: 2021                                                 [facility]

 

          5 mg                2021 12:00:00 AM EDT tablet    60           

       TAKE ONE TABLET BY MOUTH TWICE A 

DAY        TAKE ONE TABLET BY MOUTH TWICE A DAY SOLD: 2021                

                  [facility]

 

          12.5 mg             2021 12:00:00 AM EDT capsule   30           

       TAKE ONE CAPSULE BY MOUTH 

EVERY DAY IN THE MORNING  TAKE ONE CAPSULE BY MOUTH EVERY DAY IN THE MORNING 

SOLD: 2021                                                 [facility]

 

          12.5 mg             2021 12:00:00 AM EDT capsule   30           

       TAKE ONE CAPSULE BY MOUTH 

EVERY DAY IN THE MORNING  TAKE ONE CAPSULE BY MOUTH EVERY DAY IN THE MORNING 

SOLD: 2021                                                 [facility]

 

          12.5 mg             2021 12:00:00 AM EDT capsule   30           

       TAKE ONE CAPSULE BY MOUTH 

EVERY DAY IN THE MORNING  TAKE ONE CAPSULE BY MOUTH EVERY DAY IN THE MORNING 

SOLD: 2021                                                 [facility]

 

          12.5 mg             2021 12:00:00 AM EDT capsule   30           

       TAKE ONE CAPSULE BY MOUTH 

EVERY DAY IN THE MORNING  TAKE ONE CAPSULE BY MOUTH EVERY DAY IN THE MORNING 

SOLD: 2021                                                 [facility]

 

          20 mg               2021 12:00:00 AM EDT capsule,delayed release

(DR/EC) 30                  TAKE ONE 

CAPSULE BY MOUTH EVERY DAY TAKE ONE CAPSULE BY MOUTH EVERY DAY SOLD: 2021 

   

                                                            [facility]

 

          20 mg               2021 12:00:00 AM EDT capsule,delayed release

(DR/EC) 30                  TAKE ONE 

CAPSULE BY MOUTH EVERY DAY TAKE ONE CAPSULE BY MOUTH EVERY DAY SOLD: 2021 

   

                                                            [facility]

 

          20 mg               2021 12:00:00 AM EDT capsule,delayed release

(DR/EC) 30                  TAKE ONE 

CAPSULE BY MOUTH EVERY DAY TAKE ONE CAPSULE BY MOUTH EVERY DAY SOLD: 2021 

   

                                                            [facility]

 

          20 mg               2021 12:00:00 AM EDT capsule,delayed release

(DR/EC) 30                  TAKE ONE 

CAPSULE BY MOUTH EVERY DAY TAKE ONE CAPSULE BY MOUTH EVERY DAY SOLD: 2021 

   

                                                            [facility]

 

          20 mg               2021 12:00:00 AM EDT capsule,delayed release

(DR/EC) 30                  TAKE ONE 

CAPSULE BY MOUTH EVERY DAY TAKE ONE CAPSULE BY MOUTH EVERY DAY SOLD: 2021 

   

                                                            [facility]

 

          0.025 % (0.035 %)           04/15/2021 12:00:00 AM EDT drops     5    

               INSTILL 1 DROP INTO 

BOTH EYES TWICE A DAY AS NEEDED FOR ITCHING INSTILL 1 DROP INTO BOTH EYES TWICE 

A DAY AS NEEDED FOR ITCHING SOLD: 2021                                    

    [facility]

 

          0.025 % (0.035 %)           04/15/2021 12:00:00 AM EDT drops     5    

               INSTILL 1 DROP INTO 

BOTH EYES TWICE A DAY AS NEEDED FOR ITCHING INSTILL 1 DROP INTO BOTH EYES TWICE 

A DAY AS NEEDED FOR ITCHING SOLD: 2021                                    

    [facility]

 

          0.025 % (0.035 %)           04/15/2021 12:00:00 AM EDT drops     5    

               INSTILL 1 DROP INTO 

BOTH EYES TWICE A DAY AS NEEDED FOR ITCHING INSTILL 1 DROP INTO BOTH EYES TWICE 

A DAY AS NEEDED FOR ITCHING SOLD: 04/15/2021                                    

    [facility]

 

          50 mg               2021 12:00:00 AM EDT tablet    30           

       TAKE ONE TABLET BY MOUTH EVERY 

DAY AT BEDTIME  TAKE ONE TABLET BY MOUTH EVERY DAY AT BEDTIME SOLD: 2021  

               

                                                    [facility]

 

          10 mg               2021 12:00:00 AM EDT tablet    30           

       TAKE ONE TABLET BY MOUTH EVERY 

DAY        TAKE ONE TABLET BY MOUTH EVERY DAY SOLD: 2021                  

                [facility]

 

          0.1 %               2021 12:00:00 AM EDT cream     15           

       APPLY TWO TIMES A DAY AS NEEDED TO

 ARMS / NECK    APPLY TWO TIMES A DAY AS NEEDED TO ARMS / NECK SOLD: 2021 

                 

                                                    [facility]

 

          0.1 %               2021 12:00:00 AM EDT cream     15           

       APPLY TWO TIMES A DAY AS NEEDED TO

 ARMS / NECK    APPLY TWO TIMES A DAY AS NEEDED TO ARMS / NECK SOLD: 2021 

                 

                                                    [facility]

 

          0.1 %               2021 12:00:00 AM EDT cream     15           

       APPLY TWO TIMES A DAY AS NEEDED TO

 ARMS / NECK    APPLY TWO TIMES A DAY AS NEEDED TO ARMS / NECK SOLD: 2021 

                 

                                                    [facility]

 

          5 mg                2021 12:00:00 AM EDT tablet    60           

       TAKE ONE TABLET BY MOUTH TWICE A 

DAY        TAKE ONE TABLET BY MOUTH TWICE A DAY SOLD: 2021                

                  [facility]

 

          12.5 mg             2021 12:00:00 AM EDT capsule   30           

       TAKE ONE CAPSULE BY MOUTH 

EVERY MORNING TAKE ONE CAPSULE BY MOUTH EVERY MORNING SOLD: 2021          

                        

[facility]

 

          2.5 %               03/10/2021 12:00:00 AM EST cream with perineal lamar

licator 28                  APPLY TWO

 TIMES A DAY AS NEEDED FOR RECTAL DISCOMFORT APPLY TWO TIMES A DAY AS NEEDED FOR

 RECTAL DISCOMFORT SOLD: 03/10/2021                                        [facility]

 

          25 mg               03/10/2021 12:00:00 AM EST suppository 10         

         INSERT ONE SUPPOSITORY 

RECTALLY TWICE A DAY FOR 5 DAYS         INSERT ONE SUPPOSITORY RECTALLY TWICE A 

DAY FOR 

5 DAYS       SOLD: 03/10/2021                                        [facility]

 

                          hydrocortisone acetate 25 MG Rectal Suppository Hydroc

ortisone Acetate 25 MG 

Hydrocortisone Acetate 25 MG 2021 12:00:00 AM EST            1.0 {supposit

ory}                       

             active                                 Hydrocortisone Acetate 25 MG

 eCW1 ([facility])

 

                          Hydrocortisone 25 MG/ML Topical Cream [Anusol HC] Anus

ol-HC 2.5 % Anusol-HC 2.5 

%      2021 12:00:00 AM EST        1.0 {application}                      

active               Anusol-HC 2.5 

%                                       eCW1 ([facility])

 

                          Hydrocortisone 25 MG/ML Topical Cream [Anusol HC] Anus

ol-HC 2.5 % Anusol-HC 2.5 

%      2021 12:00:00 AM EST        1.0 {application}                      

active               Anusol-HC 2.5 

%                                       eCW1 ([facility])

 

                          Hydrocortisone 25 MG/ML Topical Cream [Anusol HC] Anus

ol-HC 2.5 % Anusol-HC 2.5 

%      2021 12:00:00 AM EST        1.0 {application}                      

active               Anusol-HC 2.5 

%                                       eCW1 ([facility])

 

                          Hydrocortisone 25 MG/ML Topical Cream [Anusol HC] Anus

ol-HC 2.5 % Anusol-HC 2.5 

%      2021 12:00:00 AM EST        1.0 {application}                      

active               Anusol-HC 2.5 

%                                       eCW1 ([facility])

 

                          Hydrocortisone 25 MG/ML Topical Cream [Anusol HC] Anus

ol-HC 2.5 % Anusol-HC 2.5 

%      2021 12:00:00 AM EST        1.0 {application}                      

active               Anusol-HC 2.5 

%                                       eCW1 ([facility])

 

                          hydrocortisone acetate 25 MG Rectal Suppository Hydroc

ortisone Acetate 25 MG 

Hydrocortisone Acetate 25 MG 2021 12:00:00 AM EST            1.0 {supposit

ory}                       

             active                                 Hydrocortisone Acetate 25 MG

 eCW1 ([facility])

 

                          hydrocortisone acetate 25 MG Rectal Suppository Hydroc

ortisone Acetate 25 MG 

Hydrocortisone Acetate 25 MG 2021 12:00:00 AM EST            1.0 {supposit

ory}                       

             active                                 Hydrocortisone Acetate 25 MG

 eCW1 ([facility])

 

                          hydrocortisone acetate 25 MG Rectal Suppository Hydroc

ortisone Acetate 25 MG 

Hydrocortisone Acetate 25 MG 2021 12:00:00 AM EST            1.0 {supposit

ory}                       

             active                                 Hydrocortisone Acetate 25 MG

 eCW1 ([facility])

 

                          hydrocortisone acetate 25 MG Rectal Suppository Hydroc

ortisone Acetate 25 MG 

Hydrocortisone Acetate 25 MG 2021 12:00:00 AM EST            1.0 {supposit

ory}                       

             active                                 Hydrocortisone Acetate 25 MG

 eCW1 ([facility])

 

          50 mg               2021 12:00:00 AM EST tablet    30           

       TAKE ONE TABLET BY MOUTH EVERY 

EVENING    TAKE ONE TABLET BY MOUTH EVERY EVENING SOLD: 2021              

                    [facility]

 

          12.5 mg             2021 12:00:00 AM EST capsule   30           

       TAKE ONE CAPSULE BY MOUTH 

EVERY DAY  TAKE ONE CAPSULE BY MOUTH EVERY DAY SOLD: 2021                 

                 [facility]

 

          10 mg               2021 12:00:00 AM EST capsule   33           

       TAKE 2 CAPSULES(20MG) BY MOUTH 

THREE TIMES A DAY FOR 3 DAYS THEN TAKE 1 CAPSULE(10MG) BY MOUTH THREE TIMES A 
DAY FOR 3 DAYS THEN 1 CAPSULE(10MG) TWO TIMES A DAY FOR 3 DAYS MAXIMUM DAILY 
DOSE = 3 CAPSULES                       TAKE 2 CAPSULES(20MG) BY MOUTH THREE AMBER

ES A DAY FOR 3 DAYS 

THEN TAKE 1 CAPSULE(10MG) BY MOUTH THREE TIMES A DAY FOR 3 DAYS THEN 1 
CAPSULE(10MG) TWO TIMES A DAY FOR 3 DAYS MAXIMUM DAILY DOSE = 3 CAPSULES SOLD: 

2021                                                      [facility]

 

          5 mg                2021 12:00:00 AM EST tablet    60           

       TAKE ONE TABLET BY MOUTH TWICE A 

DAY        TAKE ONE TABLET BY MOUTH TWICE A DAY SOLD: 2021                

                  [facility]

 

          20 mg               2021 12:00:00 AM EST capsule,delayed release

(DR/EC) 30                  TAKE ONE 

CAPSULE BY MOUTH EVERY DAY TAKE ONE CAPSULE BY MOUTH EVERY DAY SOLD: 2021 

   

                                                            [facility]

 

                                        duloxetine 20 MG Delayed Release Oral Ca

psule DULoxetine HCl 20 MG Oral Capsule 

Delayed Release Particles (CYMBALTA)    DULoxetine HCl 20 MG Oral Capsule [place]

d 

Release Particles (CYMBALTA) 2021 12:00:00 AM EST         20 mg   Oral    

                active  

                                        Take 20 mg by mouth daily [facility]

 

                          duloxetine 20 MG Delayed Release Oral Capsule Duloxeti

ne HCl 20 MG Duloxetine 

HCl 20 MG 2021 12:00:00 AM EST        1.0 {capsule}                      a

ctive               Duloxetine

HCl 20 MG                               eCW1 ([facility])

 

          20 mg               2021 12:00:00 AM EST capsule,delayed release

(DR/EC) 30                  TAKE ONE 

CAPSULE BY MOUTH EVERY DAY TAKE ONE CAPSULE BY MOUTH EVERY DAY SOLD: 2021 

   

                                                            [facility]

 

                          duloxetine 20 MG Delayed Release Oral Capsule Duloxeti

ne HCl 20 MG Duloxetine 

HCl 20 MG 2021 12:00:00 AM EST        1.0 {capsule}                      a

ctive               Duloxetine

HCl 20 MG                               eCW1 ([facility])

 

                          duloxetine 20 MG Delayed Release Oral Capsule Duloxeti

ne HCl 20 MG Duloxetine 

HCl 20 MG 2021 12:00:00 AM EST        1.0 {capsule}                      a

ctive               Duloxetine

HCl 20 MG                               eCW1 ([facility])

 

                          duloxetine 20 MG Delayed Release Oral Capsule Duloxeti

ne HCl 20 MG Duloxetine 

HCl 20 MG 2021 12:00:00 AM EST        1.0 {capsule}                      a

ctive               Duloxetine

HCl 20 MG                               eCW1 ([facility])

 

                          duloxetine 20 MG Delayed Release Oral Capsule Duloxeti

ne HCl 20 MG Duloxetine 

HCl 20 MG 2021 12:00:00 AM EST        1.0 {capsule}                      a

ctive               Duloxetine

HCl 20 MG                               eCW1 ([facility])

 

                          duloxetine 20 MG Delayed Release Oral Capsule Duloxeti

ne HCl 20 MG Duloxetine 

HCl 20 MG 2021 12:00:00 AM EST        1.0 {capsule}                      a

ctive               Duloxetine

HCl 20 MG                               eCW1 ([facility])

 

                          duloxetine 20 MG Delayed Release Oral Capsule Duloxeti

ne HCl 20 MG Duloxetine 

HCl 20 MG 2021 12:00:00 AM EST        1.0 {capsule}                      a

ctive               Duloxetine

HCl 20 MG                               eCW1 ([facility])

 

          20 mg               2021 12:00:00 AM EST capsule,delayed release

(DR/EC) 30                  TAKE ONE 

CAPSULE BY MOUTH EVERY DAY TAKE ONE CAPSULE BY MOUTH EVERY DAY SOLD: 2021 

   

                                                            [facility]

 

                          duloxetine 20 MG Delayed Release Oral Capsule Duloxeti

ne HCl 20 MG Duloxetine 

HCl 20 MG 2021 12:00:00 AM EST        1.0 {capsule}                      a

ctive               Duloxetine

HCl 20 MG                               eCW1 ([facility])

 

                          duloxetine 20 MG Delayed Release Oral Capsule Duloxeti

ne HCl 20 MG Duloxetine 

HCl 20 MG 2021 12:00:00 AM EST        1.0 {capsule}                      a

ctive               Duloxetine

HCl 20 MG                               eCW1 ([facility])

 

                          duloxetine 20 MG Delayed Release Oral Capsule DULoxeti

ne HCl 20 MG DULoxetine 

HCl 20 MG 2021 12:00:00 AM EST        1.0 {capsule}                      a

ctive               DULoxetine

HCl 20 MG                               eCW1 ([facility])

 

                          duloxetine 20 MG Delayed Release Oral Capsule Duloxeti

ne HCl 20 MG Duloxetine 

HCl 20 MG 2021 12:00:00 AM EST        1.0 {capsule}                      a

ctive               Duloxetine

HCl 20 MG                               eCW1 ([facility])

 

          100 mg              2020 12:00:00 AM EST tablet    30           

       TAKE ONE TABLET BY MOUTH AT 

BEDTIME    TAKE ONE TABLET BY MOUTH AT BEDTIME SOLD: 2021                 

                 [facility]

 

                    Trazodone Hydrochloride 100 MG Oral Tablet TRAZODONE HCL    

   2020 12:00:00 AM 

EST          tablet       30                        TAKE ONE TABLET BY MOUTH AT 

BEDTIME TAKE ONE TABLET BY MOUTH AT 

BEDTIME      SOLD: 2021                                        [facility]

 

                    Trazodone Hydrochloride 100 MG Oral Tablet TRAZODONE HCL    

   2020 12:00:00 AM 

EST          tablet       30                        TAKE ONE TABLET BY MOUTH AT 

BEDTIME TAKE ONE TABLET BY MOUTH AT 

BEDTIME      SOLD: 2020                                        [facility]

 

                                        Prochlorperazine 10 MG Oral Tablet Proch

lorperazine Maleate 10 MG Oral Tablet 

(COMPAZINE)               Prochlorperazine Maleate 10 MG Oral Tablet (COMPAZINE)

 2020 

12:00:00 AM EST         10 mg   Oral                    active                  

Take 10 mg by mouth Three times 

daily                                   [facility]

 

                Escitalopram 10 MG Oral Tablet ESCITALOPRAM OXALATE 10/30/2020 1

2:00:00 AM EDT 

tablet          30                              TAKE ONE TABLET BY MOUTH EVERY D

AY TAKE ONE TABLET BY MOUTH EVERY 

DAY          SOLD: 10/30/2020                                        [facility]

 

                    Escitalopram 10 MG Oral Tablet [Lexapro] Lexapro 10 MG Lexap

ro 10 MG       10/29/2020 

12:00:00 AM EDT        1.0 {tablet}                      active               [facility]

xapro 10 MG eCW1 ([facility])

 

          10 mg               2020 12:00:00 AM EDT tablet    90           

       TAKE ONE TABLET BY MOUTH THREE 

TIMES A DAY TAKE ONE TABLET BY MOUTH THREE TIMES A DAY SOLD: 2020         

                         

[facility]

 

          10 mg               2020 12:00:00 AM EDT tablet    90           

       TAKE ONE TABLET BY MOUTH THREE 

TIMES A DAY TAKE ONE TABLET BY MOUTH THREE TIMES A DAY SOLD: 2020         

                         

[facility]

 

          100 mg              2020 12:00:00 AM EDT tablet    30           

       TAKE ONE TABLET BY MOUTH AT 

BEDTIME    TAKE ONE TABLET BY MOUTH AT BEDTIME SOLD: 10/08/2020                 

                 [facility]

 

          100 mg              2020 12:00:00 AM EDT tablet    30           

       TAKE ONE TABLET BY MOUTH AT 

BEDTIME    TAKE ONE TABLET BY MOUTH AT BEDTIME SOLD: 2020                 

                 [facility]

 

                    Trazodone Hydrochloride 100 MG Oral Tablet TRAZODONE HCL    

   2020 12:00:00 AM 

EDT          tablet       30                        TAKE ONE TABLET BY MOUTH AT 

BEDTIME TAKE ONE TABLET BY MOUTH AT 

BEDTIME      SOLD: 2020                                        [facility]

s

 

          100 mg              2020 12:00:00 AM EDT tablet    30           

       TAKE ONE TABLET BY MOUTH AT 

BEDTIME    TAKE ONE TABLET BY MOUTH AT BEDTIME SOLD: 2020                 

                 [facility]



                                                                                
                                                                                
                                                                                
                                                                                
                                                                                
                                                                                
                                                                                
                                                                                
                                                                                
                                                                                
                                                                              



Insurance Providers

          



             Payer name   Policy type / Coverage type Policy ID    Covered party

 ID Covered 

party's relationship to alejandro Policy Alejandro             Plan Information

 

                [facility] Health Maintenance Organization ([facility])           

      

[id number] Self                                    

 

 

          MVP       H         [id number]           Self                [id number]

[id number]

 

          [facility]           [id number]           SP                  82

[id number]

 

          [facility]           [id number]           SP                  82

[id number]

 

          [facility]           [id number]           SP                  82

[id number]

 

          [employer]  C         [id number]           Self                [id number]

[id number]

 

          [employer]  H         [id number]           Self                [id number]

[id number]

 

          [employer] [place] [employer]           [id number]           SP       

           [id number]

 

          [facility]           [id number]           SP                  82

[id number]

 

          [employer] [place] [employer]           [id number]           SP       

           [id number]

 

          [employer] [place] [employer]           [id number]           SP       

           [id number]

 

          [employer] [place] [employer]           [id number]           SP       

           [id number]

 

          EXCELLUS  H         [id number]           Self                [id number]

[id number]

 

          MVP       H         [id number]           Self                [id number]

000

 

          M[employer] [id number]           Self   

             [id number]

 

          MVP SELECT           [id number]           SP                  [id number]

[id number]

 

          SELF PAY                                                     

 

                [employer]      [id number]    

[id number] Self                                    Y

[id number]

 

          EXCE[employer] B         [id number] S                   [id number]

[id number]

 

          [employer] FEDERAL           [id number]           SP             

     [id number]

 

          SELF PAY ONLY           [id number]           SP                  [id number]

[id number]

 

           BS Of [employer]            [id number]

[id number] Self       

                                         

 

          MVP HEALTH CARE O         [id number] S                   82

[id number]

 

          MVP (pr)  Commercial           [id number]    Self                 

 

          MVP HEALTH CARE           [id number]           SP                  82

[id number]

 

          [facility]           [id number]           SP                  

[id number]

 

          MVP HEALTH CARE           [id number]           SP                  82

[id number]

 

          MVP HEALTH CARE           [id number]           SP                  82

[id number]

 

          MVP HEALTH CARE           [id number]           SP                  82

[id number]

 

          MVP HEALTH CARE O         [id number] S                   82

[id number]

 

             ID IDENTIFICATION              [id number]

[id number] Other 

Insurance                                           [id number]

 

          [employer]           [id number] [employer]renda

eld           [id number]

 

          [employer] [place] [address]           [id number]           SP       

           [id number]

 

                    [employer] [id number]        

                       

[id number]

 

                    [employer] [id number]        

                       

[id number]

 

                BS [employer]      [id number]    

[id number] Self                                    V

[id number]

 

                    [employer] [id number]        

                       

[id number]

 

                    [employer] [id number]        

                       

[id number]

 

          [employer] B         [id number] S                   YND

[id number]

 

                [employer]      [id number]    

[id number] Self                                    Y

[id number]

 

          [employer] [place] [employer]           [id number]           SP       

           [id number]

 

                BS [employer]      [id number]    

[id number] Self                                    Y

[id number]



                                                                                
                                                                                
                                                                                
                                                                                
                                                                                
                                                                                
                  



Problems, Conditions, and Diagnoses

          



           Code       Display Name Description Problem Type Effective Dates Data

 Source(s)

 

           R05        Cough      R05 - Cough Diagnosis  2021 09:21:00 AM E

[facility]

 

                    N63.10              Unspecified lump in the right breast, un

specified quadrant Unspecified 

lump in the right breast, unspecified quadrant Diagnosis                 

021 10:00:00 AM 

EDT                                     [facility]

 

                    Z17.1               Estrogen receptor negative status [ER-] 

Estrogen receptor negative status 

(ER-)               Diagnosis           2021 01:46:50 PM [facility]

 

           [id number]   Crohn's disease Crohn's disease Problem    2021 12:0

0:00 AM EDT 

[facility] ([facility])

 

             M26.609      [id number]     TMJ (temporomandibular joint disorder) Pr

oblem      2021 

12:00:00 AM EDT                         eCW1 ([facility])

 

           G89.29     [id number]   Other chronic pain Problem    2021 12:00:

00 AM EDT eCW1 

([facility])

 

             F10.230      [id number]    Alcohol withdrawal syndrome without compl

ication Problem      

2021 12:00:00 AM EST              eCW1 ([facility])

 

                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

noma of right breast 

Problem                   2021 12:00:00 AM EST eCW1 ([facility])

 

           K76.0      [id number]  Fatty liver Problem    2021 12:00:00 AM E

ST eCW1 ([facility])



                                                                                
                                                                                
                  



Surgeries/Procedures

          



             Procedure    Description  Date         Indications  Data Source(s)

 

             OFFICE OUTPATIENT VISIT 10 MINUTES              10/26/2021 12:00:00

 AM EDT              [facility] 

([facility], )

 

             OFFICE OUTPATIENT VISIT 25 MINUTES              10/15/2021 12:00:00

 AM EDT              [facility] ([facility], )

 

                    Imm: Flublok Quadrivalent 18 years & older 0.5mL IM Influenz

a                     10/05/2021 

12:00:00 AM EDT                                     eCW1 (Replaced by [facility])

 

             OFFICE OUTPATIENT NEW 30 MINUTES              2021 12:00:00 A

M EDT              [facility] 

([facility])

 

             OFFICE OUTPATIENT VISIT 10 MINUTES              08/10/2021 12:00:00

 AM EDT              [facility] 

([facility], )

 

             ARTHROCENTESIS ASPIR&/INJECTION INTERM JT/BURSA              2021 12:00:00 AM EDT              

[facility] ([facility], )

 

             OFFICE OUTPATIENT VISIT 15 MINUTES              2021 12:00:00

 AM EDT              [facility] 

([facility], )

 

             OFFICE OUTPATIENT VISIT 25 MINUTES              07/15/2021 12:00:00

 AM EDT              [facility] ([facility], )

 

             Binocular Microscopy              06/10/2021 12:00:00 AM EDT       

       [facility] ([facility], )

 

             OFFICE OUTPATIENT NEW 30 MINUTES              06/10/2021 12:00:00 A

M EDT              [facility] 

([facility], )

 

             OFFICE OUTPATIENT VISIT 25 MINUTES              2021 12:00:00

 AM EST              [facility] ([facility], )

 

             MRI Brain W/O Contrast, Followed By Contrast              

1 12:00:00 AM EST              

[facility] ([facility], )

 

             MRI Brain W/O Contrast, Followed By Contrast              

1 12:00:00 AM EST              

[facility] ([facility], )

 

             MRI SPINAL CANAL LUMBAR W/O CONTRAST MATERIAL              20

21 12:00:00 AM EST              

[facility] ([facility], )

 

             MRI SPINAL CANAL LUMBAR W/O CONTRAST MATERIAL              20

21 12:00:00 AM EST              

[facility] ([facility], )

 

             TSTG ANS FUNCJ CARDIOVAGAL INNERVAJ PARASYMP              

1 12:00:00 AM EST              

[facility] ([facility], )

 

             TSTG ANS FUNCJ CARDIOVAGAL INNERVAJ PARASYMP              

1 12:00:00 AM EST              

[facility] ([facility], )

 

             TESTING AUTONOMIC NERVOUS SYSTEM FUNCTION              2021 1

2:00:00 AM EST              [facility] 

([facility], )

 

             TESTING AUTONOMIC NERVOUS SYSTEM FUNCTION              2021 1

2:00:00 AM EST              [facility] 

([facility], )

 

                                        Needle electromyography, each extremity,

 with related paraspinal areas, when 

performed, done with nerve conduction, amplitude and latency/velocity study; 
complete, five or more muscles studied, innervated by three or more nerves or 
four or more spinal levels (list separately in addition to the code for primary 
procedure).                     2021 12:00:00 AM EST                 [facility]

T ([facility], )

 

                                        Needle electromyography, each extremity,

 with related paraspinal areas, when 

performed, done with nerve conduction, amplitude and latency/velocity study; 
complete, five or more muscles studied, innervated by three or more nerves or 
four or more spinal levels (list separately in addition to the code for primary 
procedure).                     2021 12:00:00 AM EST                 [facility]

T ([facility], )

 

                    25013 Nerve conduction studies 13 or more studies NEW 2021 12:00:00

 AM EST                                             [facility] ([facility]

[facility], )

 

             ELECTROENCEPHALOGRAM W/REC AWAKE&ASLEEP              2021 12:

00:00 AM EST              [facility] 

([facility]

 

             ELECTROENCEPHALOGRAM W/REC AWAKE&ASLEEP              2021 12:

00:00 AM EST              [facility] 

([facility])

 

             OFFICE OUTPATIENT NEW 45 MINUTES              2021 12:00:00 A

M EST              [facility] ([facility])

 

                    Immunization: Flublok Quadrivalent (18 years & older) 0.5mL 

IM (Influenza)                     

10/29/2020 12:00:00 AM EDT                           eCW1 ([facility])



                                                                                
                                                                                
                                                                                
                                                                                
                  



Results

          



                    ID                  Date                Data Source

 

                    [id number]           2021 11:53:01 AM EDT [facility]









          Name      Value     Range     Interpretation Code Description Data Nikole

rce(s) Supporting 

Document(s)

 

          Progress Note                                         [facility] 

VYYNNo1lPvIVQkGx73/JCGvdRBDcs6RbHJakUSz5LFvcRCJgM4CtYBE9jM6iYOS9JJgQCfWhQyQrPZB4

lbm
PgCvsZGfBnLKMnGeiOImBiWCxmVwccjMWuHF1QnVA4YLMvW22qJIBtTPFtG0WaDUT4FpE+Td9CFLZqjY

CnIQ9WArpF7C23s3vYWu+/FT5DsWED3mMy9UIouyIFd5wn18eOcWQqkhK/4LIqA6Zznf2GyB+nG0E3Gl

rS8kpdjeitnl0xlWiAuMbbrpuiYgwr//tT7O7C91Fu
/N/5k1SOkrQ073b/OIvLPDm/rZMrg3grSN2Lwbibmq504bRUJ4yT5zGaEWza9o49zXuOmk/eZYq5tcoR

XcTa8SwTb+4mW8QS5ew5Wcv6iKxgeXWKKoI//PCD0/ddNJgwp25sYnv1nTreqSNlAgjAGqD0h+TCJmIZ

RbKbxZ2xHx2CW1uPovd4zQvLwO5S52MIzut6DBD5e7
lvSTKw7XCBkGrhvAiaSvLP0VdbDRnrb0FZ/ixqMR0o6VclU6hV61A7H0vgZy81/auJvZWRKgoWtCwt+W

2haPK0mQH1QIBpheVtzDT0S+8Sx0fN0ZZb2np9eT0UZvDax+5b9H2w5TaoVpFioutvm/LMHkK1lyE2BN

btWGya40hZ+TOdT5U17l9Ri1GqQHtvlzmH2YEFNzbB
MnANuLmCJNvmi1Ic90X7gyhYGsOHgItYM0rREj0kbsrl4BNtdJdwr76UzENmjzW3EkSskCMZygWBfqPr

KQ3Eie7ufqpLXEGlYQNAY/Gm0fxhj2kaWznAmd8QxsRdVJTwYTEYoiW6SDFG8ZmB7ikW017zVB926ey7

qK3qCSEUIYp5gAedSudn6P90aNfSlJxeJcMCM5a0OT
dNDa9V4ErAMe/D96W6v71G9aPNgHgbC/74CpgF5nuhkQ5tfCYlTMBaSuNioTBIAlZTqdthqOjCbMDslE

JjWwyySW9ruE/FYbAKqO4qjSu7q8j+P1ljMJ5lnFwxGIx4y9Y5oyxckNlIMOc9N9C14ZiX/u87GAdUb0

bLAftYyr8Xs/cJ2jr8pbNT4oqp9PKVHvKkoIG7xxLo
83mgKQmYiZYPnS1bCJYEF+zBenOrKmPFrPXfpgHcjRTKVATA80jrsCNjPfy+ITg9IH5XmzS6FDO8l4VS

mFkD0h1MwjlCiStkxYXcrVsaKphgQx+hOw5sDXPgp7YOxNsoPcjKJeFuh6ZBcbvKUlMrmYoXs7zMRXHe

q3TX/bg7dIPLjGpmygIC6m17TKfc6he266tmsGgBRI
N4I6MimTH0NqWh3WIXdc4OGegb9FNCtcXBMorC1LnXBKXTJh41AljjfDO6ritEQ0l0tSPaU4l3Zs8l27

wEiG2hBujRbRW/51h23OpCLgd4NybH5gqPhT0+YINQ3DGKPEL2jxk7zpAjCRUdQRLd+edfXPdS/vVfTu

91d/RUkVw3uQh5d8pMflGgS0DALkg0m6J+IA7PVpoO
AQ1NYCYMm9LvSkHJCx2yKS2jfCQYnJPoKVlUs536x4s7S+nGtQDOmMoXpONifyyc6Lx4v9S9Lh+KbM4y

sRf6Y01v22rEkhSUwnoc0UcK8lQuNAVN+Pu3w/S8U2tpLAuw8/5i9IZAGA+B/5taA8dv9la43AdLgV20

Qlc7hInzJpDvJCvK6xBqTOSIkkmrUFFsjG2A5TuDFS
EBQ4W3UBPa8gN/zCWULKhy4ofTaTwARbwsOrfj489h/CjulpsqkZOGDjUUW8OwM2fFV1Ztsc2Fcl/Michelle

x5co4299CiT/DlJlyEmq7577m6fwkT/iGe0ApzXitzY/uH/PuyKgBEhzw98ZcC0t9jpqJx5KKx+BHRR1

xWG6xbiP+aZz0KfhvBlxPVFqA7t0DjK+TpQHO7U+Xh
krgOXJ1WGI65nabzFHJ1kTtltg7hM7Ei2LgITQPJNk5PQbxRhslMgtlwdmgeZffeVQFk4X8mLRZI9jsv

89C+Kx6jjhchS5W0Op7i1i8V5ydO1HTrGr95DyYYHPWHFVm5CO6PoNMve61eo6H8JMWx+aLsWldHfqrj

GUyhTS5GNnSIu+KnhXYdUrt5b09jiug6vIotqGtiS5
L4vYkXwY6+BYesaylhy54oqVVo3qDuXUDKZymuQo5AZssEdQV5eP4qPwg67LoaGnhAZsguL0f99TBmaf

JPMUdjgykV5YGprxBw0yHJNiZmlwt6XNU36lQlr9I2GelO5IhtHIxERxV7fZsHeYF62QDCPcwpEEcpjh

iKKCJw9fDycR6PLZivglrOzW+vKS3xj98UDii6qVj/
AmZbI0EkfNbxIPygG8AmJamWZn6g2uTl6Wc9JVbwCLqIhTGgAW/SuqxVAIgZ847YqdQH+DaKGMe2jX6l

gblJB1qN6HMhdIyJZxjC6wTRn+1bAre4RwTbvPExnUOhPvH26gHaUGbSs+ogpPfc8ztWuF7Xz9nWKcGx

hAadbfjHLyMms516PTVdjAnOeX4ZGF9WDSXDbL7uHR
WA+QczIRgDUVjB+UbS7VZ2R3cAhbKXP0oRZuDbiIb+BNDIj45Nga5V8ugE/x6vhxxv/xuZFf9KPyc+2E

I+SiqGPCXtJYPrcL0U3VYWSxS66lQ/oXe+u0iAgrknQjv4pTZPwtc/NsDJZewDSJOpsbDOQaZ8LOJyo6

K9qIqjUQHG9lkdShdqAARkzAvjLe6Av5w510cxPCmY
9MRjP0DIatbBTwe9kp2kdJtciVjvJmRdGEGGrmGLu85UjJYHz72m1u965yNwYNVyR6XRqmJN+WAcmqBK

5QhApiBbKt5FlGHMgqlZgbefAABRzM1LteN+ylJU7C1drXY3x4wk2Ldrreq7E+6GQpt95obp62ZZDrWN

afXIdEzcQgtU3w+OXHObVn/sBnbk2WhK/lt692QAFi
pkSILHzOr86WoiKAgNONcyFyqlKQQ3DbdE5hr8omCKF80Z32z/sXxMNaxCr8WDa5ImNWQikgpv7eby2t

b5pStZuShwqo4yXjDplJtVoj66Uqn4YHruaPojMdWYfZMjIYHzH+Nh/S9ZubBRNXZOPSzs0ZZZqYp4OY

Yxd3l6Z6NZFyHf27rWaWabPgdUHWg2KWYf8PRNF0eQ
UUJRH8gr/Mm1DAbmChZLT3hCDyeFG6FUbwpsD97yW25zWkwGszTrvFZI4kZdAfREk6YkC5lHHAwky6KW

6KFZEryUFi2OWSvhitntm0WkaRxzglynqZV8iBwWealxpONjM2+3Z+dpVvBY0f1vOnj9Asgj7JOHboA+

8KaJGi7fsXFQgF9McBMnT6yAvxq6VNYHqKCc+wtw6w
ulnR6eQlH33DZcD4hZ1fhCm3dHJZkc3LeUhrpzYrHvifzjtZs5LbX9Qu/MnRRDhaKJWUNpfECnMKPo1K

sAYHXm1OH+Zc/4qN5HkEG/oYMtqjajhyOoIGBpgMMoTB+SdrIlBt8r0KbiYwnB81NdOQiq46tykZzDvI

6Z+Af6Ixd8jiXUom2RtEUMbO0DETFQSU51MKcwW0yV
MRI3qaRG7ERvQZJ2FQ6edzxeIKZ6YU3AKBFXDTO82Bay7OWGPYFWGU0FOKReCp/IIfi4XlGmKng0TzE7

iELWnrIDPTBMhKp7FVJqNxN6lvYZ+3K2/vm3UzlldC/9S/g0iumsF+P6AuexkG+VlRl1v3VTahy459w9

jVeTAtt1oee051eKfp5KFFCIGissqtuqHY5KbYSMyE
vQ06FCqJnmCmBsyzKh/lSPZXMdRsg8RnILwkqjP3iHxbyAxRq8E1lqr1/9lJIuxBhSKoA0y6Ch3HVw0M

HLKzHcxGBKEPoaRM0L95i3MHOANz4/Svj/1e4ORWsvVZTYndoQ6/8eC/7lZfrvgHXRHEDJNGP41dt3ZM

YNlnQKt+xJhuNplBRIH489LSRDwbjdSkpO5VR1DxOm
PGyyl4Glj+hgfVKzwFF9JwpeTeOrXMT4gRyH8ZJ3hryB7xMq97xht7uEiuWoKQkDmJ3BVpPhWor2NNNx

eLtD8+W1OVLMGrwSR55AO2CwbeGXKG6GKTcWWt2/rcMahgBhxxcWbS2lOXdtUcKgvJ2yS4W+DL+FBfUL

ZGyh5byrIejwrcR4kCTEJeQd+QygzQdG8Oyx59zmQK
j3d4FNQFfArtvF9/VI5H5e5TnqQ/OE1D2QtJkySsGRVrzj/a7NUJW/B7j9TKbNxbrmIu2FJymbsda7nB

AM/OiO2zgedv/lQ0gyPG8Kf6Psl+gOmuXfnWfArsUys5nr335PyTMj1wLkWOD4cKSAc+aqP54cuE5s59

zvvfwKIh6EJ7vFH78b6J6Z4tU7hyur1Ld3apXsvmcB
QNX8urTI6l7zTeFv08p4sQwnXD1EA3amhM8c1p7BcjJDqgclMjLwbro6KE055wFQq6YBASkw0MWZniHw

+X+ZQzhIwFjbV8s1rJmO7kOQquJaOe0WCf1spEMjSYcQrgBfZbV4bLUf4Ng4Hkwef/Kzg+i2P3GP6+v+

ZROmnthK4Yh33KzQACYoSPpGwF//1VWCbcTAE6ybT5
/3is6fIHr0rejwpjyGaa5ThZE8jhVF36yb16VNboi6zIYN7F+v0Ng9UgF2JdwP1J4unBS3d6c1riAUyl

wF369Z4jkXLl2Tt4fk7RtSGTgrCW4uErmewc95+ApOZqEe1AwJswYXtUPy0u7S5t6Qq0+fyq4MKctSPz

oQBwlTFZMKU4M8QPUUdIMIB21WMomv+yGhMS7v8q0e
jWlJR4ZVI9HgPjr8jLBH+IHoDWmhDJlLDluG7KDrv29gfpohQFm4oozNi3aO4+vz11N3mrLoz1ilRZrN

ekM0OaMWu9LVK2Fds3zo3nfmEIaZS4BtdGvvDQkDCckGm0lA4x4897/pKY/lNUnkhuGhvPSwQN7QRlUu

BZ6jrz7RACVhBA0syw0CTWY6TI1WOKVpHH7YvDSiR8
JwI4JPCfVbFVApVRRrTK81KMKgEQPWEJjgCERmO1Qdy791khCdriSiYKHjOg3UOJGuUF2WUNFdOPTahP

ShWOLlYUAaRyY3TVXnRMwvSHYxU1JnwyDaqsTiYNJwPKTUDAndNIMeJ8xoi2XaJVs1MS3HXD3YitJts6

DgaoPpC4uwR7ECCK1HOUTpR4ATF8VxT7jxTaLul5Sg
O8gpQkPyg1AlMq2QYfTmHq1KBgHiHJ8mtr1LRsMfNS2mhx3VLRH3RH9VrDa4LFVzO4VkBKHqPFXns8Oy

IN5LBA5wbNphJrBzLu2+IAmpJEP7caYwwZ3IXZNrMAfPU1uDlu3X1m/H90EbVDw9fEJ5IkvRbTYTI7Yf

M4rROGueUSxCtXr8+4ABqwS1zCnp8dykytrJpSznn8
2O92k5HHk584zl7z8LWBU4b5wh/yQWP4uej/5Q78ah05bC1k+m7Ta2zw97Qi4ZV1by1pipuZOjKvsPWs

Lb5eOa2H/h8TQuieiZj5OziFjstUr7h3+z+f/xn8pT5fL9z8LkD7qXSURk/AR5/Ktt3kS+K3RLP6eATE

MTm9zoGOKjr56NllfMo9ANsDocMD+1FNf7qXMQ94BI
AgyFSeKcOxUlzFuoZtV4XjFN0bNatOwyTeXH8suToiDO2FO6+yx5rSCw2G8lC73DxCTctijBb2NWqitn

bdsIAwN+TJeHbpqOteD+Cswi9pIieQxmFW6wJD0OMawLnvcsAPmEclYiVuFrBjxG23hVqU6LSe9f4mgx

9Kp3QWRnhvOGYjUqFCsfxA8qoDwtexXFe0I3okWHyR
TZR035eicA6vGFNoaGtCPpJsBmEpKRA3PP1pusGQWDG5i6MjZpJojbNCcNTwrdUAZZvcrp4YwMkvakcS

sMGm93WsYx9NBIK1FRLWvvraZXHtyHPJpJoupSbPzgwHwHj6UdX644pKctQRzHneKPrUuXmlEEoFvkKk

L60Ewc2yGkC/cvmhsU2oe14IfsCxuL52h5hRgFsUjg
0e/RlCzjzbPRPdIjwkLHjX7ExDC6uUnIXwRn7ejJgKyJWbSHf+kzwmh6q4cKYffu/4OIo2Wt9AwJXKWx

tfG/c5z2gRz8fCtgp/YEemVnaV/9OpZasidUys1EqgmJaIQS9kHlL2rP5BOtL8FBM8aN4nNFWNOOsw3M

KdVeE5KIhltcTDGm96BCj9UtlckBq97HKiRldVMOMq
acpomaRnH+E+d7hmEHclXkVKNkTa/x1Hm3GC4Y9opsa8x9w5z1XW5d/EwL/CBI6xJ7EHuHm1SPA6iBSB

rzupHNPiYmfXA5WQODthuOiVchETiniuNGsKVKmmUFRDlXQlPLsBO9zoTSHaslz4SoapdZIIjgiIDGZD

RKVJUEemUnVvTzmToYjg+gjrICo5PKrpDoP3JGffIF
d08T1zYXLAxH08jRz7WZaboCygYiJe79Ig0DGsAAAm7VsEkFL8gJX8UDeImmG0EYLZfPlFTTWNrIgjCH

c6hhIK9S2a4cmQHJNf4Q9DQzKNktyi7SGydab+86c9CmltMikHdlzON2m7DZt1sD7+JFvny6E0msW5h+

fno9dpYIps76iqFmih7LWXsnx8Bplv8ngsetqxorYF
XyXUjB7MAjmh+B5j1KtOvTd1FcFwae8lKTSGecJLXQeuPGQau3oFODAPsPke0wHoaTJi5Dh29NAvSOjp

li0ZfeQnsqyQH/Ko1zp0SXyJEy9NJ4jm8R+Ac9U7jmfENLZBe6xxavDySxg/4Ji3I0KHbimcTgfrLt6P

NdSGeI28NdJYCx1cucYbq31Cr/UszoIFdzZZi0H35D
fyeNEleCHAmxhuprtqmgZS+NSTK40JVeDndARaI54tX6T5g1PVgCVsZZJuUrCyFG2aGVyYL0cSKFlQZe

vBW4VWjtNwcQunCv2vHyho0QUwVWEpKWS6AYexJ4sz/zvB880etcZkEMNpPc+UkZvSBilz1bpJXGAgI2

Ol1wg8bKPZjNiMA/3MrlYWTWEykX2hb3txayIoP+mc
QfqyXDn8Dl8LILb8QLIbAtiN6Tg6jicc9MQ4kijJ5sMU6hmylDxvnN/+SNZRlpY79mHHg68k+B3CSEHe

SaFiBsYo6NVdqR2kS63Qmplm95kotbgjR8Gp/8CY6nKqz1N2iL8+FLaY1E1X45Wenzkw09zCKy03PEz5

zXbrwvzcrruXaJOcESOhjmPOVWOcmbngVqLPfvTvLq
SI5wHinYg0JDW90XDWoBKD8cbmHwBXpMVK4NXkGsATPsj3v6qnulFCaDE4QPsG1aW5V4aQz/oHaanW7/

s+4ouGkzTPx6i+NvM/E77VyNrRYhRt1dlRPXWdg94er2IqcXnPJL1hESnLLLgZxux31aVqg/HZ6cqK+T

60SSctc7YVxT6xK3tEdpvasg78SUct8s/YHhlthPXU
JbtyHCh7hhzCTyKWFv4VfPxLUywGmhn785vUVmeXZGYoXemTlSJr64U2LSLw8an88G7V3o1waLbzK1yt

ktiEM9Pfwirl4+y4xbs89xvk5iqTfECtg/1c9eldXszfkxR3/Uu6K8YMkhycmQbdy5M3vQug+d0UmSH+

l7WgyInbSL52DciEtG668qhwD1DTCQfzP5FLn8/+kt
dXkhOWh9Su631eWOpK5R3UvBcLcTq6Dd6wA5PmQYSdRkxmMxIPgYRGwnGzVYm+hAsSKIwtGiHgaEk8ET

ZwRanK8Pn4uXO7+iz0JMf5Mzut1Ddkk8oxUNLsySBVze5lbqko+usmg3NqeqFEZhReLyR+BzU9caCCpr

VKuBwlvsoCfP85p0SeQ4R066Lc/1Qdx5syI4ks5zDI
3YSH4wHx+hco9XSMXo4LD6IG49zkFUTb4hYr0A0ukpfsVrmp08vV7HbhNwrK2jwq+ybS88Fudk2nidUw

wgal9inTdGh8eRtVkl4GyLnm6Tgrnb5BUmjvD0e3fKtEi/1PukFcC2K4kJupBlZhIxJ9v6I+PptPVhSJ

VrlQyqqp3l2APmRlJ/IGmTaRi+t6qkggj1B/hmqOGD
5PA07zWGt2YVzFGKS2DylaAY6TDLOeQjtb1EAExiCML+u+mRjX5H275JeOoWS2JJWAJazdU5PFGb2Ndy

jBisWngqIKtdLElUKd8kaAo0ZJqAtIA3VvtqUe6a7fljOd11F3nbC4mm4wn2zHlPEfbu3V87maZHR6O3

K+tw5clrkwc6aZidJl5+i/uQxYLTfMXdbJ0ILVzyu0
bAqDIiQ6ugmnyY9n1M4ClsSNJdLz7G/80D7yhgof13+cuZo81PFpsxqw4Faz5MjBPUdg3CP1yeNIDMAJ

pZhR0farLByTf7TmWLLYL7uDttNQLdoBnb39hSbGIzTNwtAQo0LwIa8Wgl+XLMQ5zS34rRuV4cUz2zWQ

S4/EV+ut1GPrhPBYnaR+V31hbnhRqItlOVUeNNjcHJ
yMIIODaMZAIqMuUS8OWGvUtn8PePD+vnL3UrCDJP4DkrOqKDqKyr0QOHRqFpY2siptddzfYnwyFJsTnA

9Bfy1wvFHi2lyYkaAmuRI2H1L0Ve/tyEaBqLAdnTxT3mAP+wli87jp9lU6A358+HYaPzUMPXNgt30VI+

asXHwDPWqXv0j495eDmMNS5+NVZ8LArJ9Qt67Zpg8W
KE/L4CeXh0ZeDfRG1be3C5ZQkpVl9I44DtyL+qBUEzAGzm00N8mLbaPdWUW6hX9AL6MVhBRjSce4vLVa

wle2posQCD7HE+yNvlJadylmeU+IGNFPwilfvYr+47NjHaB5inhK2ttkRskGe/D3VtcOJucSiZJdov6W

71PBhY8tcfnzHWS4jWL03ycrpRY47z6eUllD0JAZhd
r8VoElrqQ0MrR0InSacV6M3IWJ9Qg0ML6ne8c8wa/eY+v7/LW5339T0THaJRSaxe5HvQwaWN8m0NIc9V

oz35d/ooyQVrTZgm56iDeff0G3arXZWzSYz2YYmwv7aIHbVLkV+5QDr+qP5YH08Bhqg46Ev/9F5cX52a

DepUSTwn0PKN4Zmevt3kWMs0v9l1awudr0J1Sjpjfk
4y3LzxXfmra7MPuzj02Qy1uDvTv0cI3c5LU5UD5dt48GqvokSbBtZH1oxYB+uhXqXS/iKkaQf+4gZUr1

BofWiZ4ms4jTE2Uc4ZdxBekAjfKT9V1kaG6/q1A4urt5eseXgErH+OahVVMDG6cqmv6Kh+MKBgz80pIn

fIgexuC8nrptF+7xLL0+T7540CqaxTaFNulDrY6/fv
pX7gS6H2lIg5LolCieRIz3d4QS4dEN66jozmq+cu7apvsXxksIE3oIFeQH00teZvdDjrUdfuc/Texk5S

fY/Xvks+1G/HExu8UaW0NTyH/RDpZ2BslzV//h+l/wBlNRvlfaVeuFNdXK4HJkPkBI3qlx5COkElOC7a

wt2HWYK1TT5CJLYzPQ7PoWQeN4DiP2FVClJxXJFlMJ
RyTB64YYYePWOJAEvwIQKcY5Phf066tzGsqnJxXVEsUu0SIHTxIP9STYDmAOPynKNpMHSnTQUxPjI1FZ

JuXChtNXQuB2LnihNljtNaTUUdXLSGPAxeWUKiI6oqz0XuPPl9PQ7QDQ2SivUub0UjalPmL1ijY5XBBH

3RHVToQ8KZZ5HzL2wlFpZiy5NsO3iiNfLkb7SvEu3I
EtWfLg5ZOwObBN2ajj9GZPCgAH4owa0ILNR8FQ4FgVr9QAXpJ1KdIGGvTEJys5QyZY6UEU0iiFsdBnN2

Nj4+EYifPKC5ltLytO9MQIZfFljd25KXge+q/XS6CpLBMHzSP4refsX2awn7UvQOl7vdTK6xDZzFAgVv

QSfK1y/WPKj5Zc9rwLgpfhs7jROIm+dyuqdvAwvhfn
7IfdlFJrr62/ajzbQYXYuv/sN94Ytcnih0LK92ctrve7EmnE9Uqa/+6AAd22QRDJL/ARkynn5Xclzmq9

EZfixL4nPoPsxii28I7082AaZgHwCeowfc7PMMPKpBQct8+mjUh8y/R9cVLljkcHmbCmPpWuWYOQ3OCD

KG46Pn+WI0n02+ZNe7spsBI+QC0fCFh6NhElgkcMM5
34R8x5z5OS4Y9tnzRqql43zZxuBVFTvWqFjnn0P79k9UsN78WRWt4hrqJUDYHZFQdduQwl4q9p5SNlWe

1d/hYsSFnEdnTuW5gRoP1GLcMpCDJfhqzr0AetHNuZaJIe3k80W3vFCWwnMeadkLGZEdsqoMjrfZRo80

/4ZUIRz3GK5MSnt+CG0U6UtFupIHd11Vkl6M6yMMfr
dT3V9T0eeIguyZHGN4xcIJzEmKhf8YJ44s3n48YJ7bmhbBZ7V+40ex8ZEL5PSBfzbE9n81TGgnBSWUbT

JCg3oyE+q3vm9BwRX89wJGYkng8zchNg11+Pl+U7ue2vMNgHXkT1V1i3wDHCBdTA/LA0V7Fw3PmiZ4pk

Pv/I4zU/4oHn6bGCRCrEkgrKxhlqxYzxzVjxG/YWpa
V3tAF3nxDhmb5qLDzo+GTUlaU8TtMeLjy1lzkDk3tadhI6JXx/Raydvmxp9a8c+N2GtpSJK6Gz6f/n0x

yWcnSXL2P263+lG6Pqd/ui9lah/0QAyCDaKFCGNs5kPZtC5NMrbWy1slI8lRt5zM8S4GkKIpiCzhpS6z

hT/fPg3KyyMqQgmlJRugikeDE0FO5EVI1kTYmHT/pa
4cr2nx7E0QzMbsSEojtX0Kw8fCsPIJ7+UWOcgyHVAOHHW+rwp5xumrC6ZktTc+DFpD4eEn+c14Z8C2hq

Juun+E7bioQlHG0VoYg3nm+nVYNKDnhipKDlV0rcdDYK+5qokcUKQriEDToZB1ZCyiZr7uaxlEAXjYml

00kqxGV802OHCNGD/+wyGcO7gpw76Hl/Cgp1/1j/d6
l92C4gKuLpQFV/boqBgbq/StfqBVDoxBNVMeJPCAudvH5hvma1OcAe64CH3p2mYxeTGLoeJSJoIc9bXq

b1W1k08XAkWfh1JnMb+OlGhHRfinb1bHGdbGrTIGq+8mx0yzFyiHNvtmmKSp/mzntlqSlLSeDBH88yjV

wzOErFotdC062wQstSMeDPadW/6nUXOQx/Fh/kPL6G
BoDpMObQrZp3aosn6rnegwmsIkly2Nu3W71LfTCB6MmY3r1qeMptvVWaMPDue+O1dbxw0OeYa4ilS6lF

kAFszOsi2J4VwZO2XNBn7vPG9Wz2G2uXyTPBllapEv0HmwW0c3EO0gkb3/c+vFV/wBzf5XS8uHad929R

XRFiwKCi8KHl9lofw4pdVI4pbQolD7f8evtQsL9vt9
GgexGxNpe/XaWy4kBavu1FM6UXwQ8GVX9en2dI3BzdgtJmpbJRhGhpeI3fFdOup7BRpOg5fFlFxby6fY

RpyStnDL27jGTr6tvh6VGs+4esbCL05m761u0IV4oW8pUt4ZBKqye4sH2Nu/1XJijirM3DaOnK0zihqH

q2j01QOWiNalYKGN2K2cATfOYAWaZCok+56RqQzA6+
Lq10YjvLUuXfqV83luVfzynI2dRi4SudZ+bBgbRxuf/PqrnvBThbA5s5Qt88IG2iPe53noJvcEe9gvSP

2vntnD+WxULGa+1YTNsoBqwlG/K7LGC1N4hNmwGfNrAkpENbnbDNUvYoiQ2UCZZKvtZ6dyVZqpH9VEYq

KLU7CWy4+GysfAWFXlCgsjVdHOKNeN36iie9EJjE42
6mVvxcgEBS3BvitQCmuFezX84gkYDF6whPSW9LerXbd8dF2CFdy9MB8XmSmhVKaDL78LD8ACM/OnF7LY

KtHnpxpGf6IDcnHJdXtn6vpYwDG1i7hDWy5dmv+My/3TmM9dxUHtdxeFe7QohL3j6TmCsbudlh01hYEA

sATnbjirFX4AzQj6S7PSd1SR1U3Qw5I5PkzivabwIh
fNRowrfaJq5As81n58040+8aehkDfqY0DX22A33lRSQqlqFLHkp341zcqe++sNVgPVYbwdNK0/QYITRT

khXMcINTaOPqIlOiDBGlo+sT0yvRtxJpqhPndTuUmOlWFMt/Zoc226CCmvq8NNuYYYVzk8MLy3jdLhNh

BqZ+bP60gVFfMPNw6hQShiwQj/iJYdj0uhfveVMMzH
ASHH2ZRklj27P/fbGMCgbwiDB/Wf2D2UdYz5ZYvkFgBflRnDngi7nFo6d4tNfS5ksWngEIXRf1xXXXX8

xfOwlVUhGQxAw3eF7wejl5zBi4GQGP56EivFHMXhW/2n0WmhHfGz5q6MnMGJEO8aEM37c7UW2Cb+jWxI

2PxeGOUYSvN5QQrGw8hqixvKjSLq9vrabCI4fjW9bd
itpFvFclEsl/48qbCqySojrpi411AItAyd/iR6QfFNTb68AJX2UfKUjjS1IcYzQctou0KCU9ZlGQ8U5B

9v6/vcqyg0Yxk0pPfsgEMYvZ+O5ZhJ2aDcgrTWy/Q1rU0c+u/kBPnjf3qBQHshPMO8lNTqUdeXUkPMPn

7ZePEbfFDxSr/JJ2GOrEmkFCC/wrHa59D55cK18JQx
mylI5zQkHlkzS7S961Vkd3VNxvvnbITtYvi1h0e93GeF8Xsd1DPGc+q2lV7A84CAOsfx5BoRyNZYJPCN

6P0zV6UZJTdH/AjprKNUWiivYaqo7NAAlWtmySQlqiFLSV5SK+a1yDenJsS/tSok/e67g4uJRhE+O4O2

Lgqk+ZMhoVvmlLKFpK4P77jTg69Tl+1fTbaujCVo03
fqhIcX1ACcKytrjpX1wnt0J1LHcLZHuZWPTeHc/1TgONTrYkp7HnPPZWCTtSLLdtY/r3Nm5L7w8pxcBM

2btroWDBPtDbz9HXBr/xO0TCHVYGPEyabO7vU/xyOJGxtoH6xszQmDA5dHVkKghmW9S7H4/F5UG/Bb1f

xUd30s6mlU1XFasyg+2yo67a1jE9uC/fZ3qwBwSMvj
auNhnVGrVK0KNdNmHE4zdm6YEIIbFY7mnm1ZGVH9CL3CBBXkPJ6NwHZvQ0EoR3RCLvQeARRnCTGkMP83

SHIiKUTRQVumYSJnU4Dwm861ptMmaxRrTSUhYi3IRJJwDL8ECUUjXHMlqWPeXPGkNRMjSkI2PAVcPEzy

QDTlT4GhsbNbwbLsAAroSRSICGguDDEbG4xft5YnHB
u8WP8JJM4PnsUui5UysnBoE6ldB8DCWC9ZGBTgP7GPW6WlM8dfZiKqd0UqP2zsCiCoy5CwHp4ZCrYvSq

9ZIcEiRK0wli2DBMKxIDQyQpyLHtZqRLzpOugstVEkMB7EpBF8QWOyQ85aINAhHKXgP3WaSQX8JuH+Pg

5KBGPppTUtSK8EWvdX2Oyzkto2Uy3nan3AiJ7H2Ldo
q2gljdXcrnl2CTclZZSEdVfHA7uLxkAYs0zug3/bUJlNo7IqUws9vMcrK7hrzY2dqeNqKebYUh/8Jo5C

20ZwlgiamhlqE4pjfP//HhoELMzeyz5CUq3/H/pjPgqUGeC07yM1WvBF2H1tsj+Fh4dwl2dol7h0+H3y

JZsnRbaYH1+fj8aFc32Bm9mqd2gpNo6OHxdqCVXgco
uPrv0Y/WQbpv2UbdhCUL+ZmB6YBWicm8cHgNgrFjh0NOIVxqn5cDnNkPibkzlgOIh6uO0Md25++EGMfx

HnY/AmRAUbNhj3do2ZYMZG3nCvyTDau9VbR8gnNlbgyPhg12bnFh5LqMzWI53BGtk5F4kLwcxkG0q/wr

Moe0IVNnTzTGcykAWXdkACM2LSxw9keT4A0fhsPxP5
qBwkxMhSr8ApllFV8mHT7c2KTT4t8Lwv9c9x9gNczbOaKFOm5dv4DTR7KQhjQUfGEGkY6MDUCPZA9wdc

JFKmsFdQLVUhcxhBq9hc2QWgvBNk7ZLhUHsd1pbyXTGwjbEaKWCAjCVbG9WF7356TWqaXQQBRCGiaiF1

d8IrFLVPfyKjc73+LKKbtvCt55iji/7GBNt8Aq8ZA8
GJ4pW+mXl46PMuBfFHv7E93FW4pYVmdnNU0tN61DDL27a9j67OZ6L8f6kH6XPmGKTeHWAYSzUEonTtTY

R2kb2QMFZWxTPSbBuOroDVPOron/KXLpb4wJA10rfdNjd0IiVZQfw/2grIT7j4NNE0HUuN0yGQVSsMC9

N/lGfaxXT2zLlHUBZxy+JNtioW+ik5dU2qz/yD/qmh
gZhIPclfGh4Zevx9Mz0Rfvl8qvVK3gBtJQ5rjYXyY8keO3M2rJQ9EriGBemqHfWnyGYvaal1/FopPKUV

mdGhAfZNHq58hSQldUoHHvpLLxM4zTT5R8MzG+cQvkHzwMF41avcb91DcH98lRVTQHAgBwRaLbdukZGh

lBz0+cyjpPcuD+88v041qCW5wHe65dTUH7osIcgDZf
rjvPsLmc8XhArnDHGhxxFQzWjNXcFU82ZIpItrGYZ98Y0VsyhbbxHCyWFUd5W+WqXzatEOTrEmxqRk6k

uWC05kMHsMBAV0u4Uds0mZbmodRhm/lTA4Tc/qHLQ3Gz23nsuahQuSxNeESZ9CiHakFxHVvA364UpPEu

FTrzaaNkegL7dWvj5FcYOT3Q94VPMXBZNCrxm5bOtP
TaAkb4WXQohWTDXK7nbiPERKfNSkf0SjV8BQmeed+9QZ1gQ8KIpXayOv9I6ximrRW9fdlibRKWNWTWN8

vtoH3jQ7uLOx2yo2/pglih0ojXxsYzfj0kQ5kM8Mei5wxaxaFAE0D96pYews64K1P4rXYGL8C+D4kM5o

GPZLRsTKWt5s0FPt5MqV4ptpDLsIpubI/vd0+ovppM
u7jbReGoHPI/WMyHOj3y9QNrVJ1fLWLGfyRoq2SOEjb7A8xt3DsBYaEgMSR00dgCKGeUoKZ9JBy0L261

Qm+rs42G6NDXnOl0FzjvnPLgD21ya0HSaUw4lxbYiv5MIJXLlA03ZxqTn1dUyWmUSauO2gZDVViNUC+T

EB66kcf4Y8Tvsw6oJn8Mz1Q3+4nu+LH52A/+gmDOMq
hPpyaUxsm8EexJUnUvh7QfmO7CJP5x3A+JFLsZlkqPfdJtmKWtlXuiDauSsjj18vtETTUVwZNKnnp/R4

sXLkAesBggsVSbn14Wt7vH6jLmMw4Ji1zbW6mIbGP6Ntnp2HphITKk8C6U7ULo4dj+vQTkhKBpbolB+m

UMbAujbO5vWaSaxHRu+hkQwe+hjFwf94SaIXM0f6sE
6vbbdfKLhMzcmEz8zmPLwVNB+kiTKWarleqy1/JZOBDsZEBPxCtW2/ResunuS6TGSzK4l5b+uDSQurAZ

5g7konspe1LJ3vyqcnImxjyTtmYIi4SqVCQlP+/xRou2lIAQJUJN+ll83lr8bBjesKh4ZdVU2zVdf4xs

V5WCmCcl5laN7iPDXQ0euaAa+6yCNnck+CvsC3/1Yu
PxFwLwKg0qx8TVugLVXv6u+OGEDHjB23CmyGSNyQLhUz9Sfl+AiLKNxAY/PJwK9alEmQH5i39K8tuiVr

dMzyl1LXE6sFOu5J6tiamjfDb70bjGNEddJaeD2CO4ETb3GN9BzP6QtSmVIxYhliNRBMKunkyjBW2gn5

qI3q2kciyF4/DU/485O036NslvHcRq2u1+l/eOXUc6
pazle38dZx+yWn99zz/+PgMAkA2TqG2w/G94PSS8hBxGLP1eaR7U+hHcCwXy9CdYelrYDwldIXZzxVm+

PA7PyKYYnvvWT2fg2+wQ6oCTMBcyTWooe3BB7+Ejkye1ysUXTX5eRgs/UduKCaBZw+TTgXuwAdibV6Sd

p2TdjYr2JbVyg8x+nEr0wDu/OqgPVhz8RhPK0ihGrk
RTilV6GBr/geGo6xvE8ww1G6Um2L4Pwc2idts6PORq0YnZKynyIEkIM29Z2vJTob7L5Ybp8vD/p0uS8O

92DoDE0Zi5v9RSmn0waWQielI6F582sqPn+NIx9mxcMSkB+J0l2AyaUfdxKTnEZtQ9opwL83gm+qwo6P

92oB78L8I7lVj4uAgQiAfkMKTCPAnoRBZvae/a1dux
4FHybmAUtf8y7eG+K998s63LJbNZioUC8FqXx6r4WvDvZZkX4Ba9zA64byZm4fBDhM90TKY+sCtdu++M

YfM6lEFqCfhSQzINOL+JJsVm6eLJuSeMmRJHIPBv/GDuiSxn6tmi87Lymgkz6iI+zD9ckxHJ9S5azTvf

vAWLaEgiZXExNQL0Pn67PBd6u4DFNUzPop6jLqjsrK
Uw8vr+H3EeTO2UeMc7+/BGdRuwSNsbqn2KiQoG2I7EMaf/TVeJv9l8Hty7O26SPZ+cM1tAzqpLlLDyo2

IS7XZ63wkWj5CYOudwd+G6CNd3oPRE9tCrIaYlmwsF1uWk4DxNbZ7+YhCMTp1D6j1l6xcxKgDGsJEjTn

2mfaeF1/VBhNmZWnEm9fRhn46+SfbYTKbzD6G51NXF
8bP54qlg2/LmgytOlcek095pqOEoDulVT5wPAwNDgS5CEjhQUQJVy7mp7hVvrGch/KOsFTgi5z6DhYPH

6M3eYZASW0EdCzVbI9/TfqmRiE6h0W1ZREo+PylpHeElG0irjvBnbGSoTs+//3jQ7pobg09768m1Os48

K0/3/Ktm/Xv2UzrhEoBCaDr4rhazxS+0dgYeRHFdhm
5agKLFgN+Q/6ZQQbWEP+y124fBqSgD+sPoYdbZHqJ4Em06RcWaZiroNc2sdZFSICKwB0knqWVuP1S/jA

GvI1P/bHPsQ2A/iI6W1mc91DSrOnZxNuK0FfO3ZnXY00ds00OD71do89QO81lz11XVSxfykn+bEpcPJD

gqnxYwNryK/5wQbWkF/ywb5eXq/iEgmEILiv0NGriz
zMQ9L6fY1iutlWSUQfHVJfRwpgZBC3HBXUFXRFT+qH/rdbyISCapc1zQqXWs8RGWZJR65SURWOK90ZVY

WMK13rZJBZLBZdWpyKAWq3LIYBxnmCW6GAhscJR5NHojhOC3HJArHtnfwNFrPyPoSCNW4CeYVXvT2W7X

BlFOzvhZU0YjVNUTHYIBOsQ5wpvAsgPlEK6ZUqAzcu
XN9MJjkaUV0HApclMZ5CLpvFXkoxeCg/Fv6VUTUBEsQyOGT4zeHrzR6OAF3vg4ObXJraOQEjDE7fji4F

XOB2LR6PREOgVC5UfDNhM3OeV1OQKbBwDRShPCEwHN39WJJpSBUUZAuqHUYhE3Hzx914vrXjrtAqGSRp

Hr1HXFLvZM7UKTGeNAIocXPaOMAhVYJoZcW6DBQkVE
ooEKGpA2DpsmUpujPrKMWkKJEfCf8UEARoKI4Iks82aCK8BZMbWeJiKRJprxAtRTUprlS9PV3CQwAzAP

J7vZKmHesKYJ4LKIBmlCBxIM8ASIMdxKZaDU4+DQogID4+XWlxasYmPncNBvFaELHtt0XdCSpfODr3M5

XdbETygmFzUjasmLMWVRFqOBCgU4fvtwg7xBRwTqCo
Cy3HIuJuo3EhNUDtYDhVtg5sQFXxMBJ+93b0K6w0TV+UJRoiLe6WeCT4Xip6FYx4pVuvt8RSyEg2mGFz

Hv7W/sJR6+kTJyHYSfiP4ndrNgqQcPCK1jm4mFHRq/5rbYW+bdvW6u/49uH7pQGP+fsnzW9z65wF2/zK

Bm1+3346Ijjlg2TfKG++zPbyy3VR0Ft8HNaqa/x1e3
C9pNocZA/Ar8N4yz+4D2pq2xd/XiXDs+TqxQtr7+G9YBqdQ5YLFU8+etMN3CyPj/zdomHGbRitL17RIy

M26tDNYhjG8ZKN1eTyFvx/eH7Eb9oRSoC+FCv9omn4lXVs3/7y1TbO1Pdzs5E385gP0GPu9ujySFnXKp

ZFBu0vlE3WlKClnORJq01V8yi0ia24PEOM8O9PwgKf
/ulzpl1P0+AEo6Ya150cFu3qcJlPQXwufVoB4pcbWJy7OLlOCgjS2ZbD2ynZ988J8VcqxKvbYBLboYos

ElajluyikE0pu+il2QqHJBpUElf6c7OqANdtHdw5BSVMeAtUMiRKo4vpIdDgz5TSyCYdeCZmIzkOsPMr

ky88EdeRZretwIt7PCOMNvmakIHiPn/BZSRjTKRjPz
qnpXcUwNWzDrRrqqiaX9j5lHZflRosSZQAy6HaD5YirmtNfK9Cc9bbzSfU9DuOO7R9nw7W/ARUQqrOUD

ZMdr9Zd6mW1xuqZ/p0dHUkF3ySe7be49S30yfslLXBOYpknR0r81SqcATWAPf1o9EujNFKQ5SR1ZU4kx

kW+Ac5ZDf8goEauuLm6ZqONEWreUK4+DHKxOLA4t67
QdNXEP5mZs8FEwlWMuSdgzTLmGwZqeg6VcuU8APKxIaoc4xLAiEs9BFsMnUOQEpGTqg3tDT2FpQt+khD

+1davtj1bmNxocPKHaKFvawwBL9StP3tdKf5Km6MjrHGi5fDqWWSDuiZfM/ksyMNDUDCY7DmWgS2YI9I

74wSie6ozR6FEOZW5wO/ytixjHx+pAbsJZU/LArQ7L
+zpK1aiIeACinDzyMG1bdgm6VCrqjyabu1fqT9FvqTmGpQ2X/cP/a41s6o5pWbJWXyAge/Fv/JNX7J6s

+jyNquGfW9Lpw+CV8xaWQ0K0wXoyxIYWRUxDgjPlUXwutd6D4RlqOmHJRE/C2ZJSQgBHRIorHWsGGjeT

k2lk5jB2XdvE/E4vowbnhmS1BSCBau1wpdCKL6ubR8
vH05g7QMGU0Eppq8fTtG2qdGXp9y3wRxEwZWqpfZJIl0J6p+ckzsVb1mI3j0F2pMfUOr5jw0mqa0u6sz

2uE2NUkbV3V1oG2apYoSHylbpEYlREpHTRNuHMOy4AYQVXccWDdIJ0lFKuVg5oQTODMPxyKatVXWVsHi

AM0oJzimAAsjGje8wTLDRfoGJm1mLLXO3pglkA5LJQ
DStrvCY8lXBGBA1RQJbwS+eDPPGIvySNJMspgdamvEftrrHZUYDQdHeFThX5fBBGmLhxYRc9UkxxkEfQ

9EdeLuoAkGaePeSHJIXPGQmFDTvreOar6DQaOcAs0wgu/EHvtxtYYeRFYUZTifOYFtXSXWeT/u5NtVyG

k/ci3YwOOw61TPOICL7Tqh8zp3bm2btPUw/TgdTkz7
i3O82txo76csM5FbAxLruQ6XTH5uT81OfEsT2+akrKaCHQDkPshq0v2MOaK1U5e4IJ9rS97OheSAJw8/

UQDOdRuifFTMczvH9d/acNSU2cPKFmLpQyQIZMyDgtQXRA5rMxyk0BnlgAfGwHIyd0uMOyvzu7GrLvoa

IWB4wZSh0FWSBcRy/gVUqpwNhmvPo5JeEIs3OkHca6
eUNWQuggLqEAs0SFxWJWwXZCZmXXpZVZVWCP1+BSFhb/VFIY0Q8sLSVVmvQDCPwOa76NhXV37E1qBND5

kVR+NuZRC1Zir8wmbA0jaRda6oO5fJiMusypVtLjWWlkuRvAAam9vBfW0Z9R9diYbN+ggFCGZMMNdizh

dm+MirBKRcvEk8wCQQLXbSGIVCyBIgEeGF3ktlHT8S
1n48dNEm/qwiOMI7iuYbIO6eO0LGnyu02gVla4WUcO8RozkR04sPbHGMTwE1FVhZ5EWoK8qcZZHycnhZ

LlE40hdbKsgrJCPUJOFSYks5WhdtGYnehKNSa+5Q4oh0vbmv7CMJvdgdcIGb3upJdtOqt/bVOGORlkQl

OCDH2AZGlnjKyG2AVRiCdKSse7m/0YGgg3qf8QmsEC
j9TYf5rYpIkBTLL9kY9/1he0iYwmx1zGpDu2y6ABsrJ6837lEXJNmqhTmnYEOTnTL5HRp8yamJx3fuAB

wGvNwF7hppz/X8CcHXZe5PXtJMjPmVRzpC9BPbtU88/zIARptxkKuriqISRU2oTrqQQ5hlztHhvKWRFI

JhTv7PxpmU3zcgXkS+gA70oGcazEm8q6hULE4RfaZL
QUsBKyTGvrWywyOZLMZsn+BO9aqgQLn8YSSWY6PNXfZWJU1IHBEcfixyGxhiNOuQj2pqDvaBGtUcPxQ0

XYEhNRasUSyvQR+ZUZt1VCezsIlODgQ8SqcpllYmZZOAH1PxrDPtqa8nQ/tCRvcgq8a7JrJceGHuC9Ld

ppDBd0Byc3dheRzP6OsenJuchBFZIKj5g8mh9VTtpa
60j//lk2hBtK1Ud5qXH95JCU/vwzyePIvGE4DZRt8X3D8ZNzRyUSiX2J32AkdaknUV9TD6IOMis8PoxX

h1BeO1qOM92BQ2Nqa8oIPbtlDoxP88+DeHMxUBY19AiooihW4ldG+U3N+7Eq1wtlQ2X4qJQvR3PErlSU

QjvpmiS4Yq1NoCtzRteBKJhmHiqcdrh/SC5p9h3p4Q
BNnH0PO+UtT2/wxjYwqlRQKehpRQ3s7o1oe64wvUdldg01f1g0+crap4DHs9wuKMlWqQjggM0LmMtHDl

gi0ipv7vt8/huBmxI1uPXROWMQyzo+LIPK434bShIGNoatGvs1H6+RFrtT2ydzr2HbrMdbAkzWLWiPzP

o6Q1lN0lI7Ps3E3T5GzaJTwRuDUR2SoIMvi2IUl/sx
B7VBe2WXlIRT6B8rDv+Mt6FLaqEZiyTbnNqKIiiOvzr9fuxPgQ4ZZ6vv8c/iYIRSTHaR4+Pzco+PdpjQ

6ePyX0d0LlgbgzBeV9zJ136dYCGK2tkQdiVIkWoVJNe36z21LZ0AZ+ietlb6NqoI6BhYrE2SlCega8db

9n66W3DXnSmkb5FImnSmwf/uU3vNEUUlyXeoeNqcIT
pZoxcCyLFxECBQBwfbkSKGxr5HYI4YulPjNe3mzqqe5zG7A9KqHPE0TpOHGF3IMI+73sjPNOkZ1C05Nq

sSbEVmbAo9OMKWJgxH5eRurkRwsGw3+nYEs/S5Y7EZEhPekFjTOZSROGLcEdGdKauOYBNbr1OEifEcPu

z07eEAYmUCTUbosO0wLzqoAUktpWqCj6ByX+rYKaFD
5kLTtNw9Ct10L+RRn86WeGGqyjSsE+Zju1ZgVkwzkOiEol5dypQX0IvJCbZZqvFTQ5HLB2HY86U79/lS

yVetfYCy4L7XlJoiCUW4cuUGDiQ88675qBBKu6YBm4njdPArI1MgEWeCuFY4HcxizvC13Gz4wOcz/nvJ

CGbMW3PvbVk+RO7H5Bwa4VIVAIhSl6B7IysaRrenKY
3k5z31CV6g7WtItgxm35sHO9K4JXAte6/g1YIDN+IdU4Ru9Vmw0VSG2hBkMEg6FwLmJGBPq93g5UFoSf

0KdFEI53kp1S39imJ/YfaCuk90SKE5T+KdHo/ULX0/rssM5aGzjB12KQIwUkzCbZlCoQjO0x4KTmQuyk

0+8tpS/wD7EgPjQ8Jlh6EBbFgcX0g+gnulQWjNF6/B
1mp1urnSnK4CI66/mSzLi2pKM13NV6rr+HYR/t/K2+EbHvHE2CmvcMCpgVexRyIKQ0XMTK5ElCNNyfTM

uexJ6nU6+4iCcccjXLbfDkfBgGVadxkDu7vX1VF4A9WBBT32brPrlpBWteRfLlBAul3L/+ENAXQ6NE8H

mWC8UtZbNx6DRyZ5Vb72xhNr2D96KdVcIFJy+rzjka
L0NFB2MlyW9ZP/2lf8cJZbAuA4GygKSpHRcwEpOUVKRp8nFt25y/NHNCWhja85gMdhRjczBZ6GJ4x3ri

xdM16IIZmGDxi/mpqQapgX0a6tqx/WyEHhjCIld6DHYHDpCiz1AqJV1JrXHnMm0kNVak4gY1e8MzCswz

nSwufZd52Ag6XsiweNuoGOaeoZdLZI3KpgAtt5P0FW
uLgySS3m1s38zy8D0MkoDh/Tj+vnz6rXku6YE46R+7e4tJU3ruB+eEfje2wx/gaoSDAXPFk9I+1egFVw

3+aRlX7k9D5eDHwkKMo6WYzd+6rpN6wtK/yGrwpxn/6K8KQleE60ww+Vg1+fIYUnpUv97Z4zZx7iI5DO

X4a/noDVkN/lo+upAMs6wmJom8hz+00lZ5vZbZi67d
DVkNfiUf/SGrwa/gwp4yCogByAuHanvIoEj+/UXw1hOoFIczucOmvGKmHN3LHfKuNF2cvq8TZVUcIPTv

DicHYwRpNMjWHyOnDTDyWMezDM2KKIhxYDbqUZAcQ3YqmlTgjIJwMUReJh9ZLVUrST4VJMDjoYJtNZSa

YlThNOPHNmWuOPYyOVQgyFTJn1zqPfKpDMA0JIIiXn
jqOR9DQQMvLZ4Jh292RD28jaMqKeHjNCDNKqHgVVPzY3HfvJTfQOxbS7YkS5OgUT1haOAjXP2gdANtZ4

NpF0IbobesZPNIXbCgYQJuMHdzPNHaQyVcUDucAPL+Bf4JGDA+Th8WHG5nr1YqXYuiARDjPH2yue0MXZ

T7ZB5HeMm9HWKcL2EzOLKxQLJcz4PzMF6KLE5ecNum
MTUzMz4+ICnjCHW6ndGggR8YAEKAFohy09KOwpiay7jFNQmiOA7SDVYVHTMPwIKhtuipskt0X1rvHYjI

KFNUDP/9Ltq3JeZ6ONtxpChqNxs57+28lzRA++8R+wK9PDajv+4vYAfKeSQsi0ND9zjbph1yFze5J92W

FwmNVPZl1lfNfphAqdkI/shPg2Phw+ZqSd5rb6CsYw
dZMR/crR6q+r9+AgURVf63Xdvq73RvexL4VezcC8ycK+DOQcp3fMWfk+GXZ58wJQ92XmyChIPdWOzBDk

3T2Hlf4PR8ryeXz3dp8CwyWbP7k/0ahv/R8LHstyQxJHZNbL12bCI5EO5O1oaPRSwF1NDzSOmSdVESbp

bXJvB79ndX6UYp6agoFyagK/1Gl/L5NCfGTCNTFQyG
9pRcagNMg2q3xZkVmSbqP2dvu7yqnbATtv/UWjWkCvZRg+U+IWTXVrsEEo9aTAJ5xalnWc6ouvdIuZIo

I3028htl4NnZwBghVvFH810OwUmegCNXV1sd0anMKVPNGxBAJt8KMUyaAQOHrCTYWCqJ6RGHlyZdTjOG

J2Le+gPbdhlZJNOYGEih1Y/KhzPeUGIUDy2uhGtIQY
zCnqLlA76S08M6vKu8htc/JONDz1M926yDpuRuzaiDFqRNDsFPZovU2QI5pAzHcKT67tQH6jI4wRfNZj

MGiAHg7RFrnCEO+sdWiL8mo9DqpWz5A2rH2UW3ceYAMWkTo7O1EqS9cbfrHL4TPFygx4j+0eTZT5txgs

X3jDYoSfmqbIrFMLQ6DILQ5ubtfwLME6BLyVRz+ww8
QA2USVcu8GNrEidkgaVQu2LFroTxtZuqTNkI/EeFan+c4MKEp6tI8dCKYH4AjivAWyFz3gJSZPV1Rouw

3lX852pBSPC+NXHyvV6SErjGnmGlzqoPI8+6yDoFO7eqWTMW3+amaalvB+BaVvnpLNGHGQKE3OlytXA0

dxB5925L0uYS1Fhsg+EwS2xrW1om1FAEjdEvOinP0E
MxF0MZbbbBw3utc34x7plsZee+B17PV28OVrhcc16bu3LQ7Ot80lLZ7ixAwTKqzSS9dydhU70x2bnjJC

ALD4JG+KTsLLLlCG6eRhVXuE08JLXG1T2qDGAeSaWD+QoXITTUcm82T7tAoW8zBsywBFsuw8lSkOeIwU

px/SbTznR/TRVAqyT6A3l+56pzTDp7alZ4Rk4ctgWO
dPga4k7jbskH29qS8NSL8UN+T2fzMuw9RFEG3wOMseFQJU80ncjuYKMPCgMo7iDHgRzHafIeO2K5QHNy

RPALOpRGZl6avZ3rA8lrP5eQ8wXLv1QfUZNh+3hFzqe0MdB6JbQcO745st1KZz2j0y60lWnnc+fuoQr0

4WBAUvx5mTM4yu4Yey5z1dTEYAiqD8+xB6FujvWg8R
xhwjXkg1H7dz69G2Q5ngTtTZPKU+FuqUDWh3mTDG1PB1/xerbhXXDNYp/0qpH6zAvLNXqYx6JUd7nnX9

CkL1MpmH6qBbaTGXxCajlclLNKKTCNUYNXvHYQiLpZVzT8r1fdbLLA2qWOf10FfpFwYzxebaexGka+7M

OXVN9BIC7mw/sWgfrAnW8xP2GIqfWzhkNIX2zo9fY+
BGBMbtQINRUj6wbChR26zEUvnLqy4SuadLahA+IuOroRvk9DC09zzu3CMtiCbyb63SDsrsSaJKhe0HeH

UYuoWO0JeX4OL1/CWZGVkT5PsPlNZEkYmTTPG3mnrDhmQcD9sFELsZecfFM2FG4i7zgYQVPpXBkjMy5s

/idGGdD3vxRpiDmbBzWQczADbDRLoGlI1bV3hfNDxe
JXY8AAoqGHLsRhrjZi6LNG6Vp17ITskFmduMRtQWfx0FcMt3FInk9QBrJAh4JypQyTkQznH9j9dxZYC9

bEX06AcFUa6TNZv6kuSEQVtGgcNmrImWHOSRi+81li9wHnKc7ZyXzronMqKpYFG4fzb/8SNuCeLExtG9

ezgkeA4aGlJRekK0hqv0MGa5y3+kE3gT3NvB/XX77q
ij9JLMILivYiTFVZb8v/KHMdX+5JQSXKkjFcpOl6+G90buDySTuzxrVliCHtCL7OCaToZF9wud3PDMBs

ZAFaPngWKzHjXHtIWgEmYEYvZYoxTI6WIWfqMBywTAGfW4OmngKahZVsBZVsGc5HIZZmKG5SJXJlbWYx

YPKqYnQrBPXXBnXhESHpUTTmkPOCy9vsQyJjIVG2XE
KoGpmtAO0SYRHbWV0Am011QE28mkGwMFTsQVGZZmJbIDHuU3DkuOGiNCaxC0NlH4JnMU1ecNAsRV9piA

NcZ4FgN6DwxqjuNHNHFwDxKBCcHTgmPVZgXiUuISuhXHQ+Ki9TGVM+Fb4MCA0iu2TjWHjjNxQmKS2aqv

5KUXWjJYh5ZRH1INOwUig3FXY5VQJuRKCcBDG3EZC7
WsH7BDjvBmB4GBV7UWJxGuIzUgJvJGG3XLO2ZHEiUwb6KABzDtDvVbxlRqn0UAH6WtO8MHJyBQW8CFN8

KqR1NXCqRKJ9QQP9AaW0PBPvVCN2PJP7IyXpEtydUat9QSB8VUKKVeUeYQh2LSJ6WAK4QSQeKRBkJAK2

WpblUwH6JXwyZxW6BuNzTiZ5DHHhWAP8SoytDwGvCF
I2JQS3XEClJsN9WTQ0VeR0NhIrAnLlZTu5XVO1QhirRqa8KSwwCoB7QdwePfBkQRkwLyI8IupbESI7QR

B6NbQ8FkjyXkDkPP8RRQCdKpmiLox4QHM5AWX6XkpeOEN2NUQvVaU2TUJwJLF2YRUyWZQ5DJUiHEO8ZZ

O7MYA9FIVlGMS6MYBpGcJrVuBzXNZsVXQcNfU9MsNp
QBN9MJN0DlV8IWKaVLN8QOMgElH2MTUdZsg4RRM3KvK1RHGqTiLrCCVbPEPYFfDcMKFhYUNuQDMwNgTr

PdMgBOMsTGI4KTVkHsPsPNi0WVR1BREjXoXqXUv3PKZ3DBUqHvBfLGa2PWQ6ZZWvEnHxQGo0CNP7SGRr

OiLuCKp3KMN1IFAmMnQuEPf6ZHW3BRAxMmGyKAg1LY
R1OPXeAzGmBNl8YTK1WMLsESjiDOh9EGK5HDBnQfJfOSg9TOR7UCSzLqYiNEg8HMC4TMLzUsEcAGZ3TA

YwEwHhXSQ8BRQ5AcC6JILrAGM4MJT5FYL1YYItQyZpRNrlVzHbCzCbQOR9TGB8EPMrQaFlQxT1EZC8Ug

I4YFPlGYP5PQPtOyUfPtRsXwXuZQ2JOMS1PsDaTNA9
AFApOoMnOrOhGmCfRQY1SWL9UDMxIHH2BIpmQVP7YgLwNqGtNTS0ZsR7WncnTuM8TSJ2HcM9OabgLbL8

VMAxIIMoLzFdEYB0LwX0EilaYwG7VCJ4PfYrDwvuEmp2COI8DGYwUgxsHhEqGZjrTdX8NatyCCteILj9

HRR1HssrHmf1DId2EGN1ZSZaOwe6XJgdQyQ3GdDiJx
OyVQvuRzN2GkqtXhP0ZHTrKWQ8VHPjNON2JPQ6OgU9VCPpENP9VIN0DrB9SMruCTRqPVD3PuE1IGAhGB

J6RFT3OgXwBbshRcn4MVU1KFMeQmoeRIG1CM1PQYS9GJSiJIP5QHG9ChM5COSjGCA5BPC8HcU6EUruEk

AsOIU7FaW7VHKvJAI7XLZ3TxT8FUQmRYZ5TOUlUZBu
XC5YJW8ij6PgTBpfHgInZP4sdb6UWUS3BG6YGWWqOX9ZqGYxQ3HrbnAFCEBmxcegpV4qIYaiCPTjF1Xm

ojDMLV7mY0EcrYJnEVipZAXiS7TvE6XokWC1EZJzW7WeEFKmM8w9BIjyHH5UAUGuOL79JY6cRSNVMaVp

YZLwIjzgA2EtQkAGByDlMTTfOy4dnCPJg4sgQxJeXQ
AzPtYaCOW9YCwvIY2EZkPsWGSzHUHnuRyyYJ3azHVfBM1DwCMnYpBkZTtrNQ7+GLfimoQjFatJTcO1VK

Hhz0NlUQmsBTw7XZxrWVNaQ7I1zQMiLz0yyA3RtPR3uBNhU1UhnAHAkJPjV2Gri7CQh256G5BeySVbM5

MnJ18czJ3xO9zgypCiv3aDylDhRVvtSn2YXWEiIG8K
iMGihSLtSQSaOgGeGNLbzMUyOXKdBkF4QIyuEMGoE5feGXQpjaAuYpVnUATCPxJtPKPiGe0jtAYjk3Nk

dWC3o6FfQAejMXVLHOmrGC5+CXrnplKtEfxLVlE9MBNja1PsNTkyRPeqDxEmFYk4UJBmZbsiUyKgNYN3

TQE4GCQrSFU7DCv5MVM1DiBjJbD7YCVwOsChKvMcIx
p1SGD6AAXnBiglMdQtULB7RKAzXwgiEQT5BEN6YsQ0FWShZRN7AUG8YtX8ODZbUSF2IYX2IsO3SOOgZB

V8WWMnEsEtZqRdAAi8QS5SANH7NFQcOSj4GSGqKJE9PhWpHiMxEVmkZaF3SbWeMfDnVKH1VeX2CWRqGq

o5ZWygHzHmUpssXBR6SKxmWyC1BYQmSIOcBFywFfJ8
YgfeWfT6KLi5YOW8KvKoLrX9ZYAjBBL4FkIrMfE4FTn1EZK2YsfsUeR1NUUaHOBTVsMzBvUtLAH7RBCz

IcMkMNx5HQL6OhKyAmPjAPJ9RYSjDZN6ETCsPeAlDDE8HsYnKxYdDeLjDOStCYUgDzotIbo6ZJL1EqLm

RxtyEEp2QLCpNUS2BEHqFhAfJQQkUQIaSTjpIRD3GE
BtIeO6NFHfJNI1OOd3KPA9JZLkQMprQHM9QmR4XMYkCjq2LTM2SNJwRFgyGEb0MTp6NEP2FZAtIjYpGI

c1GZD0SNHkFcCsVVb7TXW3JFHsQeMoOYd4VBK6ZGQtVjHxODq2FVN4UMQkKkMtHRk6DFL6AULmPnQdWJ

r7WGY1WJGaRdNhPLw9LOP1PXQdXtTkMJ8MFNQ7NCRv
ScVzIUp1PCL7YRFtQpGbFEn3NSU6LXJrCkGrOMw3ULHsCryfSsQdKZB5IsE2BMPmBAF4AUY4RoDhGEGu

UUB9TFKcYoN1UjcpOthqHLK8LqF7YETxJeReNVizCvI1QAJvGCAfFSJ1YAChVnFoBpXiELDdFvAZRrGj

ABz6SVR4WrQyXcDjAsTiJOOqVuEoIlSkPCD2TQatKF
A1LnXvLKF7MDEwNZX0AoPqCwGkEOxcLzU1LhTaXhHhDSwqRzSaQTNlGAebWgF9MevfMiM3IJV6PqZ9Oe

chFdq8RFX1VNIkQktnAjv3OChbMjF6LjMpIdp5GC7ORCG4EbsqCvx2RIh9QMX3VhisYFv6ZAi1VCM1Hq

ZmGjPjGLjbNtF1YcMtMuL5CVL7YnZ0LXXpLFG8BCV1
NnG7CZUiQYW7TTL1NkA4LTLlGQx4UXQ9YcA9BFJwMQY1DHZ1JlM3VSJkRqx2CTT0PTLaKfidWls3XYVo

DZXOBzDvWkKgTHGsHSU0LUAbXlPsHMZwJTV2WVNsMBB3SETmQUH0QXIzShUzEWKtMZW5LEIjYHP7JXEn

RJH3AXZcMHBPVwIaYI9wrg7XCqLoXCZxEtqOSgDcFH
dELuJqTRWrOUmuNU7Ix768JOFhI1InrEYdxu5QSTEaDH5Cm457FcIxOZ4VhlaphAoFp8fzOUveSRDrK4

YeZ8KcqQU1BRCpI0PwGLZqZ2e2FDkfGV8KTWZlMN65BI2oVUAPEiVoXSLeGzjfM3ThSpJZNvOkCQLpVh

3saNOKj2jaVgCnGRTzLdPcDQZlINurRU9CUwCgMZXw
TIVouRxeWF8ffTWhBU3ZyXLwSxMyCCjqAA2+AWlokrKsJtpCLqUbQYRrz3NdCBbtHWa4PToeGOQcE3C5

eQTbSa3sjI5TbDL5kGHsX2EbqMBJkLWmW3Gru9ONl919M4WjdTNcCMFstJOaEH8yd7TcldhfD9sfWZ2d

dAXiP91kvO1jGAmsZCCvD9EsccF1I6ilmxSpYJ3AGA
R5S7qcdzVvUDTEXkYdIDXhI9bfxGcrTHZ4ADInDe7RNFEsYJ6Vt604UCWpY4FtqCTafyEkXILyRTWFSc

GfBs6DCsHqCE1cwu5KCuVxHXBgWzgQMtZsCIfaRvbxlDAtVX5NgFW9GAAaW18sBUIbIFVhA0YbNNWdHk

LlKU1DME9amCcxNUM5ZQqzCi4LRnTip7OgPCIzMJmS
bx27LXgWPlm1keiLa1ILOKzvo+7AYUxSJaDaLDwbPBJTeWPBscOYJZLCUXsQs6hY8UGBQFKLWOUdNkCT

Ho0v3qFHRY7pyIii5fUdVwuiAaeipp+wx18pEyD3F//zP///cV7933cqJJT8gCh6xXBMZCWsOWZ7pwM4

tCllLa4/Jwhph0VZpbHhSzMCG7p/GrfGfcZw1snihm
qRSIDfP/Mddj84av02DG0hEWkFhYmYr5njit2KKW74qgfif8+9nfgH3680tvs6cgX/mff3aDZq/v7yTU

AregoPKmTRR7FBP+8uWMAgAkQOon0FLN4YlpUwZ5l7S748+ih480evDMZNAo7WCoGh5kYAbJ+TXiGSrx

YPej1fhxTwYnvFvco/yAqJk8P1zQG+L2B46p7h+49b
Fh1sN59f0DhVOdyIIV9H6cWJLym9nTNK01KANg//lAeCiTi1IGOg/eQhSbGUSSVE+k4ba6JAQWMGZaYn

GI1VgsxySdumcVfU98MLf8L5C/rS1qi8BOFXvsDf3BueMX+HZ6c0uXuMSaeGUfQeYNZ09y50rOhjcBIe

NBZfA+J8whEjG0b/tGTpEIgm0ZZcCO9OytR49PtSbM
7x1AVxUh3aSG2kFzKDUAxo2eW6UU1HsSz4RCgrGeMJxM2r3T89Zq/WZm3S5hE7teRhyi6O25/Bm1u8iB

eJ9Qin0MStu8ZgRGVfucV8tZ3pkqfqgoClr91YIQj0XREQbXUwSSeuLaJsJdpVvrgXm9Bvh7BI9k/Timothy

JLCTzxkY1mfajdJ68I6hydPANPJSzF1zeehp309fSm
9I4S2EseAAOxrQJnHBKrGydkjWyr4V14xB1ouhwNLLWmCzcKZ/0GL9KeOxXpd0RLyfaCQGRle86rZmBc

dhCr34m9Q/I7ILvfnpdHqc8UAD5MO1qjjtKH2WFNLokvhKzlF6w1es07RWfRTia8g0yV4si9ig0BNYgx

aqC0uW0I59jqVXeQEbKwwlCM7J95uW1UGdQvbtOowi
PeEp/JEDnS6q8/9p1Fi4Ii9EkLD19Et3cbmdlvAPteWgtLz4U4cZBwAPdGRaVQjH99VozMzGUZZY8Gf5

gtq1Ex1U0XG/QVxmZzWrChxeV307d6mp53W9tmEzIIC75O0CrMOcaVJljD6+I4MB9xPWLMFJEUK3XOjj

mR44BbhsbqHwqbyHqA0lz4SCokgfbwmeFRGGnzEeiy
rgh2SzQN8fj0g2dJJnC8anvU+IEBYpzbUSi0sh8slTZERx1D2bD6Ng1zOMb/rQcd1osqavRZaiGSlSoe

tAZfe24uGw4oW/75AYiRC4WS0fR3JTR4jfbwfcsdlQhRNeJk4AhzFyvXEZrpJb/tOh8QeHK9OwKZRFDN

j2Jnf7muEpYRSTqeNYluMsM/UMmycz0OxdGqU7irHQ
6mx3krIYnxV+P2uUW7U76yg7g01I+yE7cirLfsJshbqUuWRa9rr36isrZE4bwjx2UYva+0n/F15Cfms6

fV0/UMfbA+WZ+jg0QF0drBAwtibaywzHumAVJAmx/8TYjW7XbaHpS8iCUNY3pbWE8bopmmwzklUtrM6w

t2i2xsNIO1bp26wolxB5T/Q7EjzJSYU6DehOI4s3dc
5esIhM4jgcuGC/J66RWK1qB9taanrNHBujElai36fzFNkDDEc22jthAunrzrFD0WyVQpeLopIdIrHyIK

aknL48EbwR/Wo9wLoxM/TO/p0aKV+Eo+ro1GL/shNsRrkcAeAWyhr7biRJ/OUErjg09K4VwwhBzmsXTt

S/aCo9vIL3FBtzhe3CEtAt6HZ3KsI6S/OmU47US53L
B47kR9NHBvb8Qj7fwXA/slBP5RKgzpETuR0Y04qSBPD3wYeiFBwv82XE3X7+zKQp1byO5I+iBhqW8Sbd

MMS4uuOg7oTPVIuLiuEzDok1wGIORtCW9/Fo92q6kfm8VaYQ7hSCrhT4m0LmeFXewOnEOx1ysCuboHjq

sR95laRB04rTkI+TVgpDJ8lMsps+byu3wE9zX8tuXw
PQ75YdQ14sW6gNU1yUI+RRpegLr88kk9U48Uj+hseFdKDlVhOwVasUdLuQjvx1sZ10vHyzrF4bIou7Pe

+wLfp+EZYDxHVfrbVEgDrRXWYfTuDtCwa+juAbSU1OI/HrfZjL3Yz12wqTseqom45s3bpAI62ABGAU3j

NaMnae1nMGnEPwWnWHOp0T2upNtyaQYFm6D8H/VwB2
ohiNqaD/7mCR3UP2C/XEKpEW2DYzGClkUR0TLt6FRJ8tN98XwbvmTmf86mKa8Rn6wn3JikeE33Zt72Z4

D3Pfcnon9bmHeOIwemNTAom+wNHZhegy853bIekT1PFjF6RwcDplJamYARJqwJAvsOTok94h/LJI3Oel

LF4vmGrlY9Gno19oFEPjFROeKgDztZL3XwDzhZ2umA
QAZPY9GDSVCljoch0JKaT9KBiUA5GmW706NlgZapS8LvrXtefTbC8M30tiXapVDl3sPefCm99Rmkl3E3

q6FU7fae6/xmqMyXktUMSLdE3c4RPqDYlRkUAVerwaL+ccQARRusx6JEPRCt5OCEwkza2dCcLevc3CQ8

tCsBKN3pzzKrymXMMmMoJLdo2cix/ya/wtAhU6umBb
t6aNAw+GBkENPVhrxR02/zC0UvBTE9yxqW2JJuQ1YPTH1Q7dt+H+C6svaFZjAtJJZUKDsZCgY5B2dAc/

oafhYuyvJK6EXnMaZf35rzZp6K98jWnpu9Xqlcbbbe3f2spdXY6oBeKUhFc8zsUSobQ562ldn87r7xBj

fwK2CusmEbwdYw3bUsgdLiKkgS2grR81+Gr1Z+stWf
4Vs/lIe4w6TKpJCsklJ5mP+ACwgIbAST92kvgLPFuY7VldSXtAojPNe37U5oEfAf1zVRupOIP69sb7Ss

2dHx9QlauSVWkr/DAPLJA/IVRV+ZEkh3zzfW1O0pbIM8t5cz5twqTABQO2VLmmxPJmKsrA8zyEnibnM3

r3nHus97X9JCuXmoUmSYOHfwxg2OeK1LtIfJ+2Fb+m
NVlr0sX6MEF0FTwxW1Btmjsm/pfS+CC4+H/A+vl8F4lxIn8yI06zfh3it8pIww8xo78ufKfP/5U21Gxo

/kMUYn8jr6gWyg5JW63Bj60MiV051p378Cd9Gy2F6DP45OVY4STcvWbNCn5DlZ2aWleRay/ppOnVBja4

RAXIq5QqpYid9itqgf8WBzx8vHwxWtVcZXV2LjMgcf
SJOMmIcgKbLa2mgT7XMhDZMfOd4+oCODfE5IDeBcS4KmoqhUO7TbKBSs7AwWebZgtA9DeOVj7yXtaLHZ

XNP7sAAqlLGZ1SqB9IBwoA5PAGRBonrh8dDOdCX00hKXJ39/441KaeN25lTrQeDGSflL7IZPy3UX6pNA

vcggBs+TjKEjI3QdcBRI7Y1o+F9qKUJlRkQVpZtjYC
wHTCXOcUDSHEfELUowFjrTEXwUoMu9+XFpiDjEr43hN2UmCtylt0KGlHMfYFmukpzKHRautcll1m1IH6

ps/44ZL50dXeUDncRs2d5I7LCVsZtSnI6Z5AP9HgnuuzJa4m+ojY6eJ/sEbnmU4B0Guk+EGgoVP7MIcQ

q5bsMaTAMZ6IW21v4BKSxHdb0LNKk1c5tPDfa/S3nO
97a/Koz0pnWT3tjy32ySxf++oHuxCyca6arKeD6CjaLms++p/Ho0EdVgjxNpMVXxa4SLEW+rRFNkmY79

Ahmet+TEuxmSgU3lRkd5pvaS8JrDk3+sjiKLbkh8Z23UZdph5iBmsk5PFqY7nkOuGeYGNGyTfTmpIvT3Ho

2PTRKlkFh9Mv8fXakDUgu2e925wi8iozbd4pk3mstx
LghP9HegK7DrgiHKfLAk9DDZ5hmp5asLSn7h9JYqNToFaImuXGZOHPJMhAi0FCxYI+0HeFgVoRPWZiyA

yvq6PoRAWMpj98njro8XfiK05zXiCe9IiXPgrudzoQJ4BKxgKmceIG1cIDnsU5jXKhOQBquUmAgGNSoV

ZtdDPrYcDjQWRkbFZ38yHX1Bq7waliKlbnjCrSQqOZ
Kbm1rR5MPqRDpp3HNDap4B+svCuS6RL9bSrM93mUOY/FMcag48VhbWWjx9AiyFjQX6auP71UI9dB5eXn

G4Tp91Ue84skT3ypfLZ8+AawSF7BSUPspdGlAAUkT+mzummFo1halb9ewgHsPfJM7eutitQ/DTxpaYqn

JWGrEsc0G1GkH3rHAjxHTSstJYGoOP9gvcTGfDiWl1
xWcMrO96YhDqRi8vPXqu7N4EnX8+QIeDJ+7RyzfcgD2YpuVTjbaRiZyDUB0dLlVYTvRiBPeupG/P7qqS

Gg0K7e9kROVV7fXcYwmVPfUSm3VBOhk+133MzXlyf0McI7pnyblChbynCqupgWI7euSaHoj4Mw9FET1E

qqzUO5GG6i+taDalwWThNxOcr1U8Qcgb7OIeDkkwrg
QICOv3NKgJo73BRqF9AA4Qpz9LrXOOj1iPX0dQY4PxahGmfc7zwLAFw7UdbKMucNZPTaWXuILDN7jjuq

BzQ2QdUKl5zgZGmCXqiTTdB6z9sqBSKjiSezxvYxoa+FLxq1U4Aezkl7HOKqQeq5/jdmIK0BpZix2VPz

hjeXIcXZoUIgcHWyUpc4EYdr2JjP3XGVWfODo5Z73B
FdEdiXAzWtnpHD+aNEPTIPdXr5x7baTc+CSvncVhFsI+OXBUwIqeVqIqNXmYeDh141vLyl2JxkVhzT4X

MGVByWfTw2RjhtpM9pdnnI7+P3Slpo1bpYZgllXQ8M1G+1agYix0axzwBRUmTzZAMHFlRLGk0qu6KudN

yorIoWkfkFj1oF0yCHPtu6huYPpl9VlxNWfQQbEKHc
9rv+p/qPjP2m/4hT/Wkg3LE/0dS4V3yu5RQaqQRl0r6+ds9RGNWNid/dtwurgibletYt2Fi7W9MeFxx9

yWKv14N26FI2yVp0fizh2aPkdpK3A/CGYmtR0ZW+pNtytv87frJQ6020v/w+xKUSqsnGlChoCvA3WR3F

jWBPAMWnBCCUZqnO14ZY/0ti+7mqfjFVicrJ1327bN
j6jhz7TvxqSH+mS/YOsquRxzcJvnC6BvHZdoKoBNqpoRXE1Q+zo5O0Oj1VmSu8AsbOfA+B/fT9JCbTYr

YTVAsTL92eoXT5qVP6NG6xlH2ciJTJS3bS5bvwXWvOfTW9i/1MdOL94Yw5X1V39PvYbBrKmUvKe1i7qx

9A/AUEsQAbkv3sdJtnJWtNOyUCLJqZGKUwCYXKmv61
dbpqvtl0YnbvqQwV8iiACVEQr8LlqRGsARwRFtdUa0eQfNvjsCuSQcFew8O6hShRXqhbSbCMCt03+BfB

lIM4bVMXRFIBYjJF8mZz3X2UPBk/GSj7pLrwoS+5/h8lnwZF2eWcCXzRtlslLXY1rL9Jm6Oda4NpTYqT

zbNc6IlzNIseV2WolVkM6Llr77FWWCYyeV0+AC7Sy2
gk/PIvRryfzT25fZNAFxXk/VYi2y2n9en72ntR2yaYvOkCdzMl54gwUvrMonpdxlg/ZlJy1nmS3JjhZv

uxj4R25W9SjBmZcUOcYQfprtsW/wmDruKN4c+6g8w4pcAwyVyniKTYv/Bxrk833bP0tq6jIRgvOQuYa3

SFy6Ew6KPsOiskVr8tb5nIO1OdYwI9OMKU8vhEJ7wt
RTVeK1vpTJUM6aDJ3PXoQcQA2YyUUA0TMyYcVcSU8Zlqk3z/cS7RN9UBTRpyh/G1pQJeaM+Tl811lHlN

68ry8VKM2+Fhhp6JzoX6qpYK3k9sNaW/Z6l+/zd+T+4H5DVsM/+XXrMEokagq0yRaf8b1zTuFeaRZnMd

pOHnS0m+l3Q8jE9VbWJf9iT+Ye/vUN3VXJ2a9GE7IC
0392BfhhRrvKBFaHd3SZ9U0rfVSX7pizv/QblaR+jmfYYS4zm2PsoqpeMmpR2hj2Nei0czkfcxz5OprW

qjiEk5uYypPH8ggK+UsfGMgeE0G72xEONFy+WB9N9XYtHppnXLGcMyjd83guX6nuCBQpIb3XKp9MUrYw

ykhFF4KpeWcnuplWqtSKj6W5Yr3nyQ6ibqAPj4tvFv
3KIyitBhB9jmhYk81HcIPrNDiA7zf0uAozqAhKeFRBiop7UWU41T1OizAQCf70WPDP1kejXQ+cINxQ0i

L7nU7a+RlHW+72t8hBvC64VMNb24C6yQwjDRHdhItuOjFcR/NDoHZ4y+Philip+f+8pL7Mbi/tnRD+dGdE/

X7o2g9bb6uwdn7xahuzo5qywkkTxfliS7XymYOe/ko
ibk9Ey5U4DBm+osz83ttznPGV76Xd86XRv8igWJpTsn2rayI0qpXIYjDH6D2FmLN20KGX/hwYbar183k

GdS+9rdnBDu9XRbq1ai5sWfvep5oTa9ZzwQ4A4mW16MeVirWVZmXzaYYb0qSlc9COT+LupwhoF4VScNP

ChoBX5rmhVKICnEK6FmmmVmnqZpsYM2clfYbmnG1iq
5sfKTBJk59JqeBWUtqeqsAnZA7UBOrKok7rLkH88BRinltsnsXoqlObyYZak8MmKU0/D3Fbb6wOAf3j+

mAuqjpAJAZqqdax0huG2ZY+LDGrsyWc48HomU4Fohyl4Uu0S8OgY06xcK4dUWgtIVUJv8ZOUZQ8rNrDG

ioAG8IHq5xBcSAbXuLvc++ebiV4v2W3OcLTA2q+J1k
ecH53sJgQ1oWXeaeGDkdASCore0m9Jf8XtQq9/7fZQ5t5g1ld2qSdFxRXtrre9I0JGkA0IfhaB6r7DuP

zbazqj2I6Npb+awhmpB2i3vh2XsgpqTKgcNLYcbqGcM2PNUTymaTejy2kFH9pV3E4May9IBCcnEtUDUe

qSki11BDuyrTUVC0U5ju2qKDHNAP0wP8aqe8FYpHde
ux5xywmOD/0PGKs/8A7lGI2S67D7zx8aUX/enDpoIbz/RrqAQW5UDrH8mDS+1TRR74/0y+hKfTJVaNXo

i19JcY9nuB56cl2kY+2Q/wdr46H1IyS89WgyU+D+AfMg+JVC73nxFF3YmBLsqbZmnZCkdyrHBz2Ol7Hw

ky2jR4Rhb7YS4NarhatjcAFBiuS7xP/e4E8wblL5A+
UH1mdpc+9CujlOkoOtjA0e0OpERTg50g3RtBBUaQd7H0kKy9nE/xy27O9Ecf0yLAGc8r0DwnSYMHxSKQ

nqFmr0MIYtihxZNj2hIVWeEVLkrFn6u5ZdD3athQ0XWG1P70Wv34S8LwtbO99dKCE8lNp7dzZLRkzTi2

G6CL+ExZE8RUDIxWh+IZl+Cqq5X2mKVDh8ka2N6oYV
9Y2w0euajA3/smz/KtC+zYBuqn7/VeJ1S0Bp3a0djzAw8m7D0simNzZjl2I/uVufbjsh/B4VHtF+6CvE

qD8YYyrMjLpE/LLkVA0M4k/q+8PdNIChDwQ/PMw96rKAAOBLdB46zy4/rJGHlP4dItBCdrP13V8jYWFg

oVyPWCqDQRjB7m9T8MUG89k5JVjxy6gsD96Xrg4jh+
6WcVamdr2G80Ckq3o66dX4A8W1CX9f+zUwPSjE2ef0BHJVN9FFpXZ6uEktfdw6cASICizv0hd/EzB29g

MvAy/2aa7QXKigITbI43MKTBy01aYnm3azJqDt9IYlGpNVdVlZlv5OLHvxzG3EVxJ0P9wkwG2RxciU6e

sRftiGJfVkWu/YlYkI2+2n0Fx4TuwiD50K7CX1mIR5
/clNwEy4/w5gPj/0K2UmEWeZ/vwnjjZ1XPo+3AX8gsA7ax8i/FoUeVXqMVaX6dmiMbTj/LcY0tVotYA/

4/Zd167iScNnRYJcWz2iIb8wbh7vj9xltb8FyYGG2/5I4oG9ywep8bYAWQ/D8mkUr66+vs1jTsKgKXm8

CsoHwLfYgQlwCtr3a6UhSziUb8xCKfkZSThUktPxt1
w7s/0K+aWnSrGFDGN7yeqjJxAGaU4U10A0AONOjmjk5Vbh23vPrAxl5H+/henlYDy41EA63lP1VKBLVC

m04cF334EnsD140RllyMdg6kB5pNDm0y/a/1rcoK1kAy4jFFdN0zq97gcBUNvpQCpo9rBo/S/K0faRw6

Pn6X/R229iOxSRCAqOO9P3KsEc1xXsxBu9Hqc5o7Ij
aXf8an+QMsU3L6NAnREhl+1ZQKqdR9L6+7XgtYX+bUcg23WrfrmItjcNuvvNYui7EHrrGf0G/DX9qrCM

Ocq6G/5F9h3bk/sUZcG/SRD1hL5VcezP0JDikao4uGJn/wb+OL2M9m74BMYHe91ae5ibZ/jNqfl03P2y

P2UC/AV1nScE0fN5hzVO8uNUEWoQmxmH02w2LNTiAr
JOy5KPjOAgc7GdP87dyxiSrP1sj8n/TfGfLFv7ogvyKP6jwIh32php2npm8ECb+Zi3a7u2Hc1c9wkBFU

wxoWyVAKL24Xha54Nwe9oSMj/pr5m9ej5fdhk65uFJeY+qjO0LirEAvpSehfymf4N2tLwY5+ncm5Y9wp

l/jdfpolrStj6G1Q5kC8ByR2OnTOGds0DT4v1A5em0
q1nK++7aSNrlnG+GjlkQoqsSiN38pIW5K0vwrjLcZ6GTDB19qovMhQa9xrItbsy5Y08q54/0JlKo1gIr

f812CUL0t4j151mFu+um8+ReMa8h3FXT+8vW9ZiX05dCVyv1dvbd7Deb0afDbko9G9rzBl/nR+7TT6Ix

2kKs+9h51arF53KK8ENSZ9dLwqfqzK9Gad2Nsd3DsU
bevS04kBb5mKjGppPls8x+YBCOEP7lrscLiYb4p3CHb4/jvfQE0AvSrA0Q6G1LdP3Fj6TQOOmO+NdhjF

6JsYI+qN+zjcXcoMHu3iXK7fo68YspJNUIilyTp6lLopyK2xEncdyQVAHx0jPlTdMDrTE0kmhlNtWRba

4CE/BFUB6teH9+U2iDV4g83y68JSBgI5kYjmo54b3G
bBMRapftnu0G8+x2Kd3D8Ih0qW6Fd6efSKC8waiswZbe0MnqaXlxU/GI59Mn2FuvMPVmxsZaxaW176No

QRWdUM36LJyJLlijFx9O+Tczc4VPH41z43QwkHz0R03aCvxXKdJco7zABPcwSZYuq2RIl16lldCbD27T

cPPYRI+OPOzEx8UUqR/TewGvTNou0sMXcFOQKtrQOX
fxf8EPOAdiNlZw7IycMNWtyPWJJoZF4tQkcVuUFxYMqzVD8QqprQO+okKLrz4e5KuvBB4/IOf85c6TYl

MDn8CmCNMWX/gVcpyvnttFAuHtfkaOkZFA+OoZHaPgvR6tocoi5wlX/+TM95+KSaqB45z6Dd9pF6Fo/5

6fjs5UuV832qBLO4rEXzKjPS5RKgC7X4M7yINzBGp3
gabYe+ZsPpK8SJksbM1Ik348jx1ozVvfaxrp/cLNtC7M+VsAz5AZT5Im6i4NOsCLCp+7bEh4SRxyq86o

/roU+p5OcgCyUKcuFcn3gVIhRidBKGes5YSF49u+8XvZ9rRZgqF6C5c7ceZR2PL+FjqjlAhzdQvPJpvq

J2RylSYXpWHJaKH63PNduFUjUi9Ij8KIQuCWQaOidg
qLckbVSY9qBzldScR5NljF2WpQbZGziYOjQ9gnS5k96tiZhHBqj8SLKwrB9ep44kvJalNCQT0KbwOvEs

eAMVrlaoj3XLZ6FcEecLeR1qegvYgUBXUvHX3K+O/CmD0yBNOggOc5v5v6pWBlVckhgM394tDbZ/yGhr

cuP9J2MWnQIKl44Kgepcc5I3jK+fW/BPmGWhl3b8Yc
1Fkvt+d5a8pAoTX7K/b+iup/cN6FxUWyL+KJEcHzgk1RgBmxGGP2DIOwzYFgJc+AxtD1Nmfirp+lDp4o

5E1SyQskbtjk4+F2IOhsHogvitfB2222/PlcRRS1tnY1a2E3m5Cyh4U3C/b0dt3VlZ72cN7g3HT6yz3y

7AfUUzcPPm+cqSU0GxQZeyNqrKzstqqmoU9fhJ6GwN
fP+nw8kBz2Ft7a4CC3Hi3LlY5e4qIvxJLYCrwja73b2LVp8TREz7N+SvGOIYBhuMX4ACgRb16R0ceoUR

hGOQ7i692jBsy0CuCaFuOeuIBCsFpdiho4jGRsuIIgm9srnNGChKsDgbzToDAyLpLRgQIRw+cv2mtYj+

evGK06h6ORphXdF/0I0S0rfC/kOgorOsZYj1Fni3ZU
zqKnz+65/+Q3SH/tCdlUSd1WxM3p/qm6C0v350ss5sUJ3S4O8rsW252QnOh38i/Ijti4HXoxZ8/LfY1t

vUgaeX/lfPdZfsaOtceZS8+89+HTAb8eZo0wcvUwlc/mNLUsx9/0l+7tHZieVH54b/0De0/uNDXPWj+F

S3FnlTs9HBegk9O9J5/N+RnU3+B6TtjYjDgU9CbT11
KJmYThueJMOHb+sBNpuJTH8qdE4x4JcSgBLfx+K9315GEJICeSwB9VIRu/BCi8ym2x+HHDPanwfVu13B

D2/88AdUAezKTeOEVNngt/UaXaYA5iMv4kONrXh10dwlMdJe/4Y9y7sk0LfipVbv3zY/677fKfeu+fkz

5mcm82a/KoKpWdttP6IX3yF56i6YJMXIrs5+eAzcB+
B/cwMFaS+R7OHq92lJa9s4aq6vw+qELjL8whc+4xgGHtC0+PRG843x0VCtdH/Vhq6h2R643wL6Yhg2j7

CgK7EAMq7J65bR+1pYE5qEYW8md2MyNrb/Nu+vM0/Xwqkrt003SJOrcPLrrdYiZlN46v/ag5JufkoYNB

yKQlEMWl6mbun8Y0vu9mwVOeGKpiRtBucdFmJDe4NE
QEVX0hbcd6UcmnK1wtB9gVq6jJ2i8hN/AdQwiM3F4IujK3hL1V2yNnFMJ8XiXrG/c0NQmQy2wD9W2Ncb

R8leH591S22+EDUBtNUC+xbQ9u66zg3/V5PkKwmjN3eqjqKTM/pNK76/nGie2fDkQ3m+ETv80Fd+70IA

++95S3bENNfHGV/ETIoGgBu0PKub8q5F/gJbt0ryoD
Z6wJJq3u5qyg71Ku/AveZnfRmNCOMtMXhN07U+En0U9+BD+g6fC/GanvtT6b3JpNN8bscCqum6fn8TMz

yM/yW3uR3VGgDBuphjfWeiPn+CZVPegyWr4imUcJS3j5o+31O+8rML+Fv7xqIlS1okB76RlIem5PiERw

TzmcON7r/yqCemI7ePL8BRsqbHWCjO55CMF8bBTx72
aW9qCbr8XNBu/MGYVc6hAfmq4aRmaEMggyqIw0AnLeltpx69oCaUA2rGB+T6zuEh+0Tbe06h9QBhmGdr

SSzy6C02+4VG4m+/dPRWo+by2hcsZEnebxIDvp7cLq0XvIGg7NQT4Pvyc0ztte57SsJIA5oXsbebv0BL

nPoRX4vsnKbXi0IlpsI+v1ByvibVswN30mrInyq/IN
PvcFhaCusojUpLv2G0goZvs5s+8FeM/tgrEhy6SU/7tlYaPkESs285IN9u2u2H5HLp5jnkHCwMjevtvl

SejlxqixBn9AF0YhCF/hwy4yd6ZX/rzZa2IeF6KjLMqsg0X920EvhWN226PPVcUzXFoifV/wQpogle8Y

1rimumQx1cJB8uikqDKW7mkVcE/Jw2Oap1jO3qrDHw
7vGjj/Yc7+rfrdRP0+7QVUxLqWdaqaM/hkP8Qp4MtwyIwiJttaf7D0dAbkLOwQ0O6qRhLPNrJ8HHiEf4

CSbPgXs1rPmrJw9LL9xpcqoeDGjtHbylQ/7sadbFVv8u/9vl0P1BnmchGA9FMx2hJ9Se4gjX19zRtjuc

L8lNBKvN9Xf/o9ir1+iuExyL+OncafpyuQgGA7RRo1
VPu5yQD0it9or40aIJy2Q0Wca5QU1/lOo3eUudw50N5V52DrqAdc4GPs0+22l1FDV7yn+yJyXcDnOdy2

2zfc8fwngnOMBciw8zehe4Mmd8Kq0b7BLOPQswBvImjfN19nuqAnf94Wz/Jp60UmNrTfq24VBvSuir5W

xd9eubj+D7g34sKOaoFrya9Yh9kOGHBKnQXmlUUUBB
8CrwN/DL2WeMQvG318g454kCIRsx09tZ7D0W1n3cw4cNGF3dMVyD149ANzrg9ciQUT/66FnK425NCG72

oWv5K1On1nGza2GyUViuKAx4tv887ZPsdxd1PiA/k1Kqaa9z6Kiwi7gde0ScDiy399AuUDV1C0iS46YL

hW9FeRz8in3kvxW0EO7LKQPN8BM79/0mNRw+gx0Ac0
Hw2UNvjtH/SrKZ/ZegYywczoxBazpNTlf5ihbWqtZZzd5RChZmaSYY4jGdd9zOg1LpzK7AXaw5S/BK0W

P4SbnHJfJG8ya9v7lHm/F2j8fR099MgWvu09QDp+R4Vewnh2aUEodJdk8t7DacGC7R+tsKUDo0t64rYM

3KaSsKyC5mJlhXkt5tqZIgqdX+eiaYC84MksMRoWNM
j0vkEvXBUioaFYDBXpZrkkicK1pt7AJGZ+VuH5zLjDjZJf7Ye0m08FgbAD15rn3KA75KOsBsVrym4pRh

L4DaD0l7F25uPx58JlsINzU2PVEk/YdriR8Tv0Sh5ZpdtlV7VsnTmTgJ/0zhIS2n3ytZzgaTGVsWGnlW

joPkO59aK5ekhpedah68U77sEfefVSdvi8T4OKTlxz
+xCmFntlrV2ZceoL8S5px0OSJ/BMpYAqm/KyAdyq3l4CCaCk2arT+cXQgRdrCan0YpxrwGw60Zb+LDvy

GEhJbnYGLE3pPqfvjDoabE7B5FQpJ7iNbzhr8LHrAk0U9Thzy6scg4yGTwux/UplwSQjoVmxT1dBgyUy

Kb6pbtE/HVonUZ46UDSUMuiOANTvMe+G+1l/ACPlDN
NVyduBRZ1do3w/NQTMY4+7+nbgIKFcZTxBLuw0TocB28Qs+fy3c7zf8o/rHFmHEUvCDMjwiQSHOCg6mp

ryTafOQBtgKtANSAZaO+dosFD5is/QofEqym/jwKfWu3dMGeMw5bpvvwC9qqp/CZz56kNkevXOAitGz5

Ox6oYYx8YGi++uy/d2rTqeFg/JAKNOXQTx8K7vqhLU
g6wj+irrM/9w1TexHawTkqwE6bF5CkZkiL5tN43e8RhTwIaYBsySbzgJ7/3/3tqws0FImfeui7g1Fo/O

BX163A4Egbr9Rr/1T0Af0fE0uDcdfYTGyLGLw0FYjcXKCj7la6ojjNcTLgS5g0JdcdkbNsheDPnwV6+F

/NJI2BD9T08pVfa7Vs1DVfK4n8ZopDKeAFC49Z0KKn
tDXg++JwqkMe3JjVY0VwpmQ/EZPdutzm+b+TfMM8z+cFrTiHiP1oRthyKmlbK/gn3pmumABuFH3xSGz2

RaQA9EOxkacMySW+IDss97i64E3Vp3ytQLzycxMUob96ObnEwi59dLqPFI7Ruev5p+Z3Cd/uw8FtzS1Y

9QV3q2L0ekLgbJp/ECpxP36lnzJ26w1AQghNCQ5ced
Q21s+8Xx7w4lOgnT5QV3izb4TNTZ5ipiV6ID4Z8R7HYvSGDf57HEKLJ5230ycW18m6/9s7NMul+T+/sg

CUp03gUNSG0Ze1V8sE1rRg1FpMlU23zz8zBPVHK47YGvYli7DYeHVljaeOUFoMaAwlm0VlrvZN6tT6d8

5I9+kCjYo3wKVcE/17Et9CiuKpMDd/1k/LFujOjhGS
RrthPzn82s820kePYlgFDwL2x1a/cpIj/B/SRA02vDBCjEJBAH/359+/Ok4gKTRtwlp6Q47lW4m7RFAk

7E12yO5yp1sP8djsLnGZo7jvdrfzeyl4eYfm5cA9lW3omHE8jegY14jirRkqc1QYRuU3FH8pM+wBQETA

nLLV44KFK/Jk/IX6rY/SsnMxJ8JJVetDBB3znNtQ9/
9e/A7C5F4PyH2Ay7kGVekUN6G+ZCUtfpl3W8lTHzzceJ7B4SHZF/HmV3nf8nYr84lvhgycktQgUFYZDq

1WrVbbSzHl0bYsyGhV3zGaNJzH6DtYRC1OKc9jiRQ/I9vRWfjYAnMqldoKtRkLG4aZvrltYedbRcv7HW

Z8y1YtfgtYAUMmY/fSNCdKVIBrJw8eM9zah/DH4bus
8z4JxS5OqoCIaaHJLaVnLsxgYnlQtolGovbhCri6Hkqx+acNqLzONyipeGxyZrVXkJM/qkekzh4q+tH1

BpVkuUy2GN8EI7Wr5/kCulKw0PTFev/5FPzfZtnAVaYIP4+Y23ksx2v/4PV6stDZ5pOyGgIN+aWYqmSa

aK7fpsRhtHfzF5aDZMsInxWhbkHL9ZPD3FCEwBhV3R
VUBj16tJ5DlxLaAtd2QfD2ajf1kVBC7BKqUh0+drwBjlh4kvcX5SXkVbOSTtPdZk9iicjEG2B96+TOtN

UOA0jb8qZgAIeJW8bMhFYL7xLPykYef1BYCzTyJw+pbQAwJf/fXMkClOiJkW+y2xk/tJfVdDvOsOZWSE

KqPuCuNGSAnTtsLR93QP34fyDGJwT8PKatib3dlkGS
V1P6Y3dovcaFzDJouwdJACA3szt41bE72hRGBXNAnBlaitRfa+lRQp4Os49VZZ1MKr2uvD6YJzlz7pUZ

nG0uzwbFJg8x3SR4AvBrmMLUUqNtyadmibbc8Ki6yIsr2cJ+t4Qv4kthJa3yybtbYz4G5d90NLG9c4uB

tjp1MeqgdO47GoZXAVow+WYG3wV7peM0L/KyNj8gFx
MqE6EuwPPN+MM/52lsTlEO4eUu1D9+myYNzEqqroM+LY6vvUudxVDX6IAoe9s5XVgAJGft5ej00qTnnx

SQubh7KD5G23tIDdbVvmtDtq0j4SeSU+DZ0IUJ7Sd76XJxXx3hz2XJYLCYVrpF/SA4fu5eC3GpuN0xvy

cih/DGbeqaa4zwTQsE9lGZbQQHpXmkDWqUzrqhdKyC
uukULmlGaXVLdDXPEOP/J1MeTyrkBi6tCNEeGixuH4EH/tgiLViOgXZQLbL87UuDE4xwreohQSRpe2U7

oEBJLYAfm1B6Vli94dmu8WmYK2iGv0yLehlG6y/nAjKKqUotzhZLZ2q5XokJJtVIP6gqBZoLk8otlvjG

xwPy1luTIFAY3SVZegWr1o9QPjDkWJBQnt5DIXJBUS
Sqqq/YjBDRuZ2oJ9r5UK3oJxYAG/agxyXZ2qSHI3WvWiwzosKYie6qo6nW2yfpUJoyd3aEJCl9LY5HLc

Ir7oPaZ+8PUScDhIfLBOUiEibeRwSDtdKZqxlZKh12FYnZYsJr0LjQ7Wy/hf/C/Og9r/h/DF/aD5eSlX

IBrfduZzjMGsEU0MSyAzTX7vvf7JKoIuHZMyRgdDYm
UeOXbfGyoyzVMhNO3ZuNV5BPCcB81sBYMkKXQjJ8NqRMF5GT1+PTbfURH4kwVhbT2KCTU1rt1TlYQGj+

+Q2tGHxWGyMGttLZONsQy/kA1Riei5lF7Bkptio9+aXbZNV+cS8puZ/1sOnJwft1rJoU/gc3sq7AGsvH

Ke6JbBjiDKc1kqqaRoYOSDeyvaC/bCLWjWrafBY1+Z
4o61DWO4t/ZD3kW0k6s0LmwT4nbY3QQZmuvztyLbtIyui8OMJgHRRsm4yJypZsWYzRGldxF8KP3Ny681

1S7EAEeocGKheQLxMirX7x0UdVS22f+Kp1Hv39yKsAlr+ZW13PblowKr9mLWfC+wKcu7ixdZ8w/bkCNR

9Rd3LIe/HlwbvBzXWZfobD6/ZT+ZpH4MDA0tr4XfLU
XiOLmgnwIxJemWJcT0KUWhs6DsXUf7WY2PJWOsEPphAP4Ps650TLZqJ2ClcMRuiq5UIRJkCr4rvY1xlH

ZsVMJYWKBWX7PswEPrMYrvQK6Ur6EvfsEeVCY5B8BytEduvQwunJU9MULkYSSiF8LlvHLjUeByTJadZM

8GyAGhloMpHg1URAUkWo5lzHNZj8wfShRxRHBsTbJc
ULAoPRsyMD1RWbRkE5b6MZvoA9EoU0ujVXSeK6IwzUQeGU8HNUEcYs4afIEskHIyNVLrHGYtWo5UCe4J

RtIbGY3ach9JAcYuIYYjLouMIuf6QRphQY8GzZLdZ9XhfdWvZ1OulYgxCS1BYQPYy196FCqiPDGvDsBd

XFOpjdPmDNSVMYNCE2GgiNWsT0VHTUVoL4tGVUHpW6
vlXZ16qXG9ZIvkJZ8FTRMBaDC7LG0ZouXwVQt9L0CpN3fqyNP7DAeZYE9sCEgzE0GhNDVmayvvEBjyYO

88aXQ5WIXuM4SqhOotwIPnwDAkPl1wMSnsIS2Ps019IWFzH5WpzBQpeaRiOCChEGAEBxAgS6NORNJyGK

SvO2grSWa2MIEwWBAqTVXwJbHuWHJeBplsDaCfASZg
YD2JXn8+KUjpjmYdXtwEGiE0UWMbl6LhGQp2WK4DARAqFShzZW6Dy696E3X9AoB4uRJlZSfuLRSrPbGc

LUXxboWkFSZAJAKDS9QfcZZkQ5RxB47vkE5eO3cbDJ97sYW1ZQvDRlBfT6Djk3ConpHynkFQp500cyXt

YbRnXFYIUM9SXEGtFB3Jmqbnj0MzJRByIVRiXv7XDz
2OGsKcGZ5vqr8PUikeKMDbDxuAWzhyUfYhCQt6KJXpFeccEil4HIR1XKA2LFWjKRZ6BIe3UCT8CvbkCT

lzORYfWuHpSdNvAbh6ECF8SIFcYwgvApZvYZA3QVDmRuryKFK1GIT4LfJ6HEMnEVM3PDE3NaR7VUQgPA

N6DTS8InK6YEKeOSX3SJE1PCQkDmhhJHy3BC6KNYk2
AVY1CMV5NHPuSWJeJJL0XalfMkZ8QKerJcV0RtRiRrC2AKQdFXF8IuykFmLpIEN8WIP1FLNtCcA8VUN6

WsG2GgSrMsDbMTm2PRH4AbndSjw9OGalSvQ0SjckAeZdLKayJfO8PmrzHUD6ZUD2YuB8KkqgZmWvPL8L

Ffd5IPB3ZBSgQsicEJU9QRT3VvKvFaMjQVA5UDF0Jy
S8AXJdBCP5MGX2OjHkJarlGSG5TYT1SzOyQpCaImAlNQSxJUQcSlDlUMHxMKF3PdA2MJKoZXB9OCH8Op

CiZnJbRATgYFM3TBA1BNYcNPDvHXvoIeZ1BDOnJLd1RZMfDXRvOGTyOXYcKoHhRkX9GAI4JXC9ZMQfRa

UwYWc6FVW4HYUnOsWpFAx9SHQ5YVHbImQnNIx2HNE5
MPKgMyMpDYd0IZE0MNFhTzWdOMy2NMK6KAYtRmMdMAt6ZNC2AFQbQfPnDHr1DET7EJFtWrLdAWz4RWAY

Muh8ZIE2YJDqYeZzSKo2UGJ5ROMqSvAoMIt9IJK2RPCvDnIyWOL8NLOxVbUhSEH5BIJ1XbB7GKVhYMB6

SNL3QQX0XRXvAuPpRQczLgVkPnIjNAI4ESQ5ITLhZx
MoMhX4DXW7YrW8KPRmWVI2TSOwZiNiLsXoKdNgRD4OEBe4PXBkKaXyVcUrOpQhOZXwSfZaLmWmAEI6BC

gfXBM3PzFuPHQ4OZKdDXH1MschUmU3IAL4IbG2GdiuRzT7SHM8JfBoGPKrRHwfMrR9BkxdNsN5OFQ7Jm

J1QrwpEha8NPC3WVBtQqsySgv7WXmkKqU0JeSiVcb2
LN4EMdu4RRx8NKB5TbgfFmk5OVG5OSY2IyrzIkZwLMuvOrA9EoYzUuLyEUN9QdW2PtpxLxUbWJG6ZqL5

XQLqIWY1DCH4VkF8NOEhMWG7HMs8WAB6KDLhUFU1ZXB9RnH5IVVrKRD1NGN3OYOvQfobLbx6KMQ1AOA3

XDBlLCb0JEPmSQN0OEL2IpO7EWSqCYV6EXJ0ZcJ0SJ
jlWyKxSLY9NvJ5VNArPZH1TRZ4LfZ6TEGlNVD1ROGaNMSlJS5HGX2pn4CuGPwhPDJgJB3lgn0XSRxXRz

LoS5F9bOKbQb7vpAYmc8CrtZC3n7NAPjQwU8WzdbFIVE8tY6LzoWTyYYg1JNdqZx3DPHHjOLOjBY57JH

nxPB0BTBLKPLdtyLEbJOR1L5Tlq7XhnuLwCBOjSt9B
DBRrHskwV1LrKHERGlIcX8MutoQVSg75OVesWK6nSAJpKQSnIDD4MVVuKPdsNV5TrURkkKEBphqqDBTi

Z6V2QJ3WKUWYYa1+LFgbzcFzBqvCVuC8LSVka6ClTZq1TC8QKZPqLLlzPE2Ug394E6W5CyU6oXAtLST7

NCW1xIGhZqUcCHTbpzNqXKOzFIfbXEQtfWdmN1XnA2
5ofD3aQ8afpbFnc0hCusPdDIooFr0VAPJzAzspz2VEvIHpQAOoP9iea0LPeFJcDWJ1TY8WZPObB3mejW

eaCQN9GVCuKv9YKREmWf8ovVNqo7EdvDG1i8SbBpyxGMUGNNn+At1RGA0om5UuNOmhJASdMT8knl0XS0

3TUsVqBJ1ode3YVcQyVF0mbf2NHQtTIoJvQ7Zeq1LC
ULUcVw9ICHGoFGA2qW2NkRCxYYErGO1iB3XZGU2DEVJjBq3jrKK7ZFSqVnLfMLleDQMLZDcdDCNtF3Uy

LKApHPSeXy0PZCNwCH3LOyIjDsUlKSEXZkCgTYWtGmEcQugaMFWXDt6SPmRzY1hSHgxyD4HlTAgwKa7X

GkLfG0D9tZkZdOB7MSH5XF0IYvYTHiIiQAo4P5I3lY
DcC8O8pIlDaJC6FV5CJE4BTAYvHS9+ZwAlR7QPCGtIPCF8SM7YiBBpVB8SoBWEM5GnlBFqUk8hHGRtkM

lwbHk+ReSrN8YBTTYIIVA4AA7IwGZpKI1UwJFFO2JnlUDmUo7bYKumDzQpNH4zGG7+LY6YWFNVDeOOFq

UeTKoqLFlySDOdYQm8U3K1QPMmQ0RRC8P4Z4e2n7ro
bj4+ZQ5TRMGrM8FRDIBLNfPiFIdyMFifJBHdIGv7M4G4ZTEiR2XFI9sxD1j6KU5+PiANCiAgID4+DQo+

Ue2NMP9gc8MkTQrpPAIoKJ0pay0ZDCbeETRsE6QmZKSxIRjhZ4XrePnkSU5SEXzsPWejSB1WMLWbBTQ2

YT4+OAsjqFToAK3MMsv/lUYnJ8ekcTVhLTnujl7r74
u/MjDbPR6ySnRJIC6kN7EpkHc3ysGKhu2SH1diAkqnQg5+MQirAOw5UqrlzX7txCRylPr5dGO3ij3cIk

1yQZokGTffjE9udkF6nZ0pUURbRbU6pyX8lYC4RX3vOk8FFMPeHMfsFSM9QtSCUZynyR7kDqSoKd3lkD

X2nBazV2z9wf34Yi0coiinWNl4PW2kLh7aOh0gVSPp
g7eqtGX4BU7oThr+RItrFAQoJE4vLMI6MwQFJv7SFZZ6K3m2rF2njEV3OJ4CKuClSHXiLHGoHDJzJVSs

ICAgICAgICAgICAgICAgICAgICAgICAgICAgICAgICAgICAgICAgICAgICAgICAgICAgICAgICAgICAg

ICAgICAgICAgICAgICAgICAgICAgICAgICANCiAgIC
AgICAgICAgICAgICAgICAgICAgICAgICAgICAgICAgICAgICAgICAgICAgICAgICAgICAgICAgICAgIC

AgICAgICAgICAgICAgICAgICAgICAgICAgICAgICAgICAgICANCiAgICAgICAgICAgICAgICAgICAgIC

AgICAgICAgICAgICAgICAgICAgICAgICAgICAgICAg
ICAgICAgICAgICAgICAgICAgICAgICAgICAgICAgICAgICAgICAgICAgICAgICANCiAgICAgICAgICAg

ICAgICAgICAgICAgICAgICAgICAgICAgICAgICAgICAgICAgICAgICAgICAgICAgICAgICAgICAgICAg

ICAgICAgICAgICAgICAgICAgICAgICAgICAgICANCi
AgICAgICAgICAgICAgICAgICAgICAgICAgICAgICAgICAgICAgICAgICAgICAgICAgICAgICAgICAgIC

AgICAgICAgICAgICAgICAgICAgICAgICAgICAgICAgICAgICAgICANCiAgICAgICAgICAgICAgICAgIC

AgICAgICAgICAgICAgICAgICAgICAgICAgICAgICAg
ICAgICAgICAgICAgICAgICAgICAgICAgICAgICAgICAgICAgICAgICAgICAgICAgICANCiAgICAgICAg

ICAgICAgICAgICAgICAgICAgICAgICAgICAgICAgICAgICAgICAgICAgICAgICAgICAgICAgICAgICAg

ICAgICAgICAgICAgICAgICAgICAgICAgICAgICAgIC
ANCiAgICAgICAgICAgICAgICAgICAgICAgICAgICAgICAgICAgICAgICAgICAgICAgICAgICAgICAgIC

AgICAgICAgICAgICAgICAgICAgICAgICAgICAgICAgICAgICAgICAgICANCiAgICAgICAgICAgICAgIC

AgICAgICAgICAgICAgICAgICAgICAgICAgICAgICAg
ICAgICAgICAgICAgICAgICAgICAgICAgICAgICAgICAgICAgICAgICAgICAgICAgICAgICANCiAgICAg

ICAgICAgICAgICAgICAgICAgICAgICAgICAgICAgICAgICAgICAgICAgICAgICAgICAgICAgICAgICAg

ICAgICAgICAgICAgICAgICAgICAgICAgICAgICAgIC
AgICANCjw/sGTtJ9chaOBxzsT6K2cyJu3VTi7BJU0kp4RiPKPiAUcsthHaPljDZmQdGQQiBhgQFvt9KH

qmPK4SgKWbW9HhL2OxBSofGK8JOMPfKDMdoBKuEEJyVYEoNoT0YLDeTLguDK8ShVUqGUupFXAsJVMtJc

AwIFIgOSAwIFIgMTEgMCBSIDEzIDAgUiAxNSAwIFIg
UA7OGPEfV982raHjOe8ZFx8UFoUvHO7vtl9OMqThMAIoDcnQPkx9RMpzBP7IiKCrqSWaCgUaDDPUOiYc

Q0hzg3JzRiXsKPHLMHimOL6Fv4HdcLOtLQt+Gr0DOD7hn0DfNHdlPuQbUF6iff7BVAlSLsRoL7KshXhd

BCPuq9ebBJRiFV3niXRlXIC0UIogtHV6KI0wAKCTU0
7fzIkoFLLHEUI8KCzjJeqmHbRvWPPbDNpoLBVVYFyYXnSgN9Mvz7WmLeF1KXEuByLrYHiqDKWqJcD7IE

91rTveHQ4AKSTnLVIbKA21ERRkRHGyHi4ZCd1RIdIrLK6vyl1XVnKcNWSgNkmZQlw0BHtqZD8NlRLoS7

VmoXBnd6gZArCdN4VLRLBfVWEdIj0AYNTyUmYzUFFx
EIgpFH4vGVOgPHNZeYobgfW5FD4JJM1ytcPoKS2XThDtDp9fCt9GQiHiV5EqW4ExBGBlVCYGYPxsBK7A

HHcwTZ6kGF3Bd3PMjEIldA6qze5ACSEmKLFnCrnlhj1KBykeC5Q5sYskLZHeXrWtEMEUCHktOP3OAZZk

JUP2YRDiWKQbEKUGRiXoI42zLK5PF1Kzq11tNeR5UK
PcGeTvFNolRY60wAhhjjGdlCRbyOcpCA5RDm5+DQplbmRvYmoNCnhyZWYNCjAgMzUNCjAwMDAwMDAwMD

HoLzI9GhFfQd6AZMIsHXKePTEsJjFfVKHbKCVlSSbrKILrMOL1XZH2DDMiRVAbOB8QSwIxYJTbBkP1BZ

ndJLFnDEKxfz5TORHcHGJaGPE5ZyQhWOJdLWJhDZxm
EAFaWOGrVvOkDDViNGImNV4NFpPjLNXhMTA1ZvSgBZDaUFBoya4UUVApEOKeCbX8UhRyELJzBULiDOzj

DWIsCUK0Aav8KXCmXCKeAB9DYmSkHSMpLNl4BJZqDHXnPWIovs5NVSPmYLWfNSruYFKkIUJiEOPiNJal

RAAdZTSgRib9SBRsPDUbZM4LXxFmLESuMAU9VPXnYP
AsWSOira8SVGCgZLYvSwQbSgXaRXObMZTmGZibHQKjPRT4JdEhYWXaGSOnTF4ASyYcGWFfIKaaDqpePC

YzYIMlha4VXYYoEFVjANM0SnMfUNHcVXTvGCezVQLcWRY7YRWjJUVfFIPbCV1RXfUnFYKxPQv2ZQLrDE

SqFFAyij1NDDNqLUPfUOg6UbVbSWDaRJGiWEboAOWt
KDWeOxSkGJKsVHYjCH0ZQjAbSBAwUyE4ENSvEPJtEAKrsd2CDSMmKNLdOVYkFmRuZYScVWTvWIklKNXr

VPSeRJUxVWWxXMYxNZ3QOpKsKKNtRdV3YPigVZZfEUPamz0MOLPwMUVnMsV8VtUkNTHiBGFwSMgjCUYk

PJFnLlU8QDUzNXCtMF2QNxVsSYZcSkJ1YUBkZEFsOA
Ioys4CWTGwYSIrKYRvSkNjMQLaPJAcQRyoVHAgNDI4DaF5LMOpGQDhBL6VUaYdSTBzTgM7UWBrPZRoQH

Hyhd5GULOzCZXxLUFiBKCbDTQpPMYkYMwbYSBwGPU1XayhEBZsHPCuNT9DXbCiOMEcOaJ3TlOzHULeGA

Eejj2QCXVaYLDdXezoDtUtKFUkIKUhVPk0xhHxtHXh
KSc5WD9LT8KccvRyTdWCYl0Op681UXI9VNDyKv4FX3lbZi0wVLFfRZXEGo6ZEUs9FHW6CXBoHwAjZNH0

OeSxB2EgKEDeVEcrDJUrIKRdHeT+ODesLCExXDHpJOPnMQCrXADuFjEtWAQbMlHqJFPwDOS5EY7vNPXI

Cj4+KFjfqYCcaBbtBDVIOjC0SLQ5ZIanCRMUTj6M









                    ID                  Date                Data Source

 

                    [id number]               2021 12:00:00 AM EDT [facility]









          Name      Value     Range     Interpretation Code Description Data Nikole

rce(s) Supporting 

Document(s)

 

          COVID PCR NEGATIVE                                [facility]     

 

                                        This lab was ordered by [facility]

are and reported by [facility].

 









                    ID                  Date                Data Source

 

                    [id number]         2021 09:44:00 [place] [address]    Patient Name: Xiomara Bains            Exam Date: 
21  : 1955     MRN: [id number]                    Account#: 
[id number]    Ordering Doctor: Luzma Johnson  Attending Doctor: 
Luzma Johnson  CC:        EXAM: CHEST: PA LATERAL         INDICATION: 
Cough.  Shortness of breath     COMPARISON:  None     FINDINGS:  The heart and 
vessels are normal.  The mediastinum and   pulmonary vasculature are also 
normal.   Both lungs are clear and   well expanded.  Lung volume is adequate. 
There is no evidence of   pneumonia, effusion or pulmonary edema.  Bony 
structures appear   intact.     IMPRESSION: Normal exam.     Professional 
interpretation performed by [facility]   [facility] 
[phone].    End of diagnostic report: [id number]        Signed:   
Yung Roberts MD 2150           Interpreted by:     Yung RobertsTranscribed by:     Yung Roberts 









          Name      Value     Range     Interpretation Code Description Data Nikole

rce(s) Supporting 

Document(s)

 

                                                                       









                    ID                  Date                Data Source

 

                    [id number]           2021 04:26:09 PM EDT [facility]









          Name      Value     Range     Interpretation Code Description Data Nikole

rce(s) Supporting 

Document(s)

 

          Progress Note                                         [facility] 

ASBASf4pZdRRYtIm71/ONLtiPFFda9AxNMeiPVu8THlmLDTlK0ZhLEO9aK9rKEC0CSrXGhTkZqEbQkQo

lbm
GfOkgYRpUsPMJoJbhPVlWrDXsbCbygcCUtLF0ZtXR1ODAcV88yQFQuDJBuI3YdVUO0ARe+Hc9POJNmnA

RySK0PLbsE6I45yha8Pw+/EY2NuWWD0FCNd+Q+vdFsk4fq11lTqYHbycO/lB56sSLXiGmw6/s0/ahd7U

XZLNlzqW7b1XOlz5OzcB1tBDcGqfpD5e+iopF5ltMI
2X/vQ4GTyrSg9vl/icUwJefDFn+wSpTH4IWQYQdS/cxrxo188KZg+27ZnUwcAdG4WvCVL3ksO0umkyOp

ZHQ+XMGqrFPh9v/EG/Mv8hW4R2SQZ2d+8/I7f1T2LZk09k1gnL2OmI2r19egiQkYjW13OYJtbi5FCxmf

ScqldT7v4lkMIwpC6nTreK18AYYVMReH73PS8KIufg
QKYSpGk0OUFPZOFcQp2WhgB5K1cEHHEucD4keQEATk3dlFZ/M1mQxvcQXl19951K5NyUx2N5boAVpxcq

if/HhhGpOShUrEopGCbrtazSFLFzvhjDnf02DZIMihe/r+kV4l/PJ3YwYquI8Ii1QDcDD5T+Fr16HofV

4dnEUA0nHTmUQh72bo5POTte17CD780xPnAhsk7ndX
2+OK47nQohD6pBQYsvNGWhp+4FYeRzUs01IfJrqd3/v76gSDEQvzQweE3jrpVqQGDSrtFwmtDTrzNi15

ItS7Fyw7tzR7c7CHIS5JLkp6isnvqp6SvJJh8Lnv72i2LI2ShMtBdxPsslqv1cqbRNgr3+GLds5ukNpA

olx/K/b8GtN1jgXdRRmsImjQPJwalOeN5Pg20fio0D
zGLSW+IM7x/XgySi/T0Si9+SYEgiE9g2UFPNh+dYUKV1L2jhEZNij5Aui79bcBJpRpRkwszGApWUDJQW

iaK6Hx6UziqQzJ9WsT6GWOlNvxmra3kCnLoIfhiMooa6P4ZUW96uKe6Eg5zqJldujMsI2SyL5qF+webS

TRhDx6TF1/4a5Rlo49i7LgMgxbD46UVop7cH99oRcV
KgRgFYOKHZ4VRZKFajGD5ZZmPFeVbGjNoHp9xKw9yYFONAZ1G+uToaxx8LYf0OJAz3rFsHYPGla6ospA

QZ1he6byEZWyQ9ddOw9DZ2vRXo0A4JWHjaUqcrzLFvLPq1OU1nMqjEdCp7WNeaiImipEtLvLTrCW5foT

yPUtg8WchRDQOgTqmcrYEc/5DMgb5cw84+hbBalPWs
b6tmJ+0MPRoSzo2yadUhcCrjeWRY8YEoxSGaQdQoyCY+KBRoOOes1Xe6Pqielw8n4AfFCldke3To76mU

bBosBdwH/jwKq0zDXtHr4KNfZKWSNq5Zify3BJHFCX5OdKn8xkZWa9EQDpxFb7Ol6sRL3vY8X5hp1+Rd

NPyUVK3u6rgyTVUk7NR8eYhf/UfgZsGvM036GwOf7S
C4p3TaBiFnjyBFi14VyMIcFq6M0Cv39ZcB+s1k2QKwWW4t3ZN0p6jKzSzlSr7yslM5ufwJ8XlbWhPHwq

0l2UKANDLEuxUvGRYBNdau0SKQAwciU/yiLHRiGrt8DvVVaXr7EW2YCoo6G/0PhJGpn9+R45AhuQNp4m

N2gsl5hbmY9Pnmb64Jy5bMCcKiyIPzNtY1wtV5WIUQ
i/CBiMbQhCBc7lEtm/C/EornT5zwA46PifYDcX5DGl/kxREL7YZYRDVfGOYKn8Dzx0TM6e6F9qG22Rf6

2j0qnbTDl3KgtkezEL27xxBonyWk3tddpVv61seYUk7A/7WeDKyHUdme/zTsMlhcYvbjrBH8ShiCTfDT

EHIRUU2JWide78BfXHAW1kRfIQH5gYzT2bdKcOTses
Rkki94ufuW7qGvCcTn9DLtSPiQ4OoSBq8dS2Pxy5HpAWZq4I/yDqK28Yke40vi9kGGzCp65UZzMEm4a4

ykBWRrpeXzbODGQjgaKCsPTJbKcUTlnnj/TS+sFBuIkxxS6xFzA8pPaxlI/nLSmVrrPYLcyEoWieNo2O

NiPZK9gYm4qpnxwifCxuWW+A5SA0YLY7H4A6wtCRfO
56qPv3lOAtS2fyO387eTce/PACAqfI0K3FamZ/Hsd9iCEEmk0CGHvZQVfgFdaiD3jY+rsLpGiXpKXfVd

UyOW6CZOx1TKXCsRE2evkfrjSo2cSAzlpE+1ETrNLuGP2dRYxB/uagIzfKp/i2uRad63hig5nXz8Fv1j

GzOG+X4nZDJYfkeonU9qeQgwqSUOwM33YLeDQoZKov
Ahr5MGd4Sx2JHeuACa/MQNqZkwYLNLsKnQAoSoO0lJJEaHiTLCDzWTMgKK2Ey/Gclzz4sOQCzFr1ZyoY

fL6GsNJwjI3XRylvBVxfJ2nkntEGtyg9rXWAaH+FiOVPq7ANwTM4KjvoHadj2Lv1PTJYoxpwXXsb8nWV

hVGJvGOWi43ndWKe3vhEJgORolgnsVfIQUc0aYIuVL
OIEnAkpcugVwkAnRE+vJM5NLFrOCTETgoEq7OpIK8Q3Idy3QRpk7rZLmKDEn9aFd8jRZLg3Z+S36tDyX

0JpjnATpa2I7cLj0QHARoe3rH6d4o/bvfsIqhhUpS9t5BsV5E2GJSlqA6DUfvG3rFt/6FDF7WrjLzUWZ

EutXSGcs3TZi+KhyftqLUnLl0Wa4KTY9MlAevffDfa
keC1VUS0TuDo3KY9M+UTiFSmhZuNZ9vPHpZfCC7WIUClIfSyKmEK36DZHbk5dP0WPCr4qDmab0l7MJSc

PZyQORx73TglT864YFKLgBTlKh+xgN4hZNy0VDGNDM9PGQe0ujAv5Tv2QoznC4f8pzN31vun7SBXXiuQ

0IHSaXaw03IjvqlsDr8AUebxK2jtuw9ZaS+uCvk8I5
Gl59UmDSbnj7OY+vMdIOTm1HOqMDdZdDVCoCqHM8VbODGRCuYRMtgrwOR+/daayUYbVFwTkYMbeHjXEy

36fPWNznOWbheDcPBFr5LpIKMwIa+Q2XBMpzaSJTbW/LGLDkmx1v3fcF/maYZuAOZwUwpuBwzC7BURiy

rr+5W7ESP1mE/IZGHGCTKtd6I0Afs0akpz/VCWlYxN
6E5kIb12nqeFmYLLtGHmJiNYtYDsuoTo6pJlfQEsAC1RTn1UV7j0Y+m48hSwQ6PQQT7Hz9n39RwnQrwX

1r8lJmAdVKbm6y5CQtUYNLdqrrqtkjA2OEpTxBieoUGeI7+wp+tBHMslXTfIS+F+U9y0t5qNVebhiS2o

r44VGy9zXDwDPnCyN47YylbbNbZHpSFY/XI43WCxhI
90Vfhb0fap15b8fvnhnXne+ICYERmaFMe6zVi7XkcPx9VSNpiSz8tfPvRIYM+9JWnGV1WPN6Bg2/EFAH

AbXqLhZ0OTzs4XwqZkEU1BKokvsTcLDgfxXYgG1EyUD6YW2E7HpVoE8O9iqzYjtWVHr1dFZFlImmQDRf

GYbAmlhtVNE61zAyGBgKNRiltBn+AoAYwDDcLq+tnh
GwEA9YYYCaoBCscyVtsRJg1jNHDNAZTYJubzxLC3EH2q1hGQJnp1KRf/AeR5UwRb67lBifa8lEelbezm

Tpuv8PMra+ESwTHXLxIYwydWK7Sklhjtxp5RtlKZW//5JJ5t97hPEBfRdWf4qpM5L71N27as8daXM+Y3

GIegN6w/edEtvA94K0O4z08d+8MGYdLObc0RSGD70g
3ejr3v8NGo3vx+QyV0FvDjxxxG/OW5uEXnXE7wSV3jX0UWnKxdB8x2IY1Q1QN2pJ8BwT8VvaYaCA9+iO

zFhCfQyzKGYYXGJXCdebeppLOPwnT/nV5Jdm5CDWZhNejAhs0SxUL68m3FUV5wf/hGLArlDMStfnRhL0

kiBVdMemJMhSJqG4fPaaCTg3F0Ca0aLeIN4CmYhubp
L1Ovl4WrBN0RCmVT4SLLxC9Y2wDlR+Cu63tzQXTF46syK5boPHkYxdwHRNrnNfblLr8JDiWFLmx4vw0z

v2mPGdhV98+Ent/fmM3QWbHow8f177/fBjcFFwHgjbpWroMxyAGs8BskDQsIUwWPTvQb2M/GQXdjcrUT

T6nSc0wyIaJj7HQYIjbsDq/5aLAekTnc5Kk0SZwmGH
vj3/LMAuhiXLkvROgh7O07Ngf6/Ds6JpYdVmaM5v7J5uKRG407bszuZOvmWOPs8wWX2UDrY/xzBc4da7

C62a4azQwxhI/WNrqpyiCng+6pVVY8SNLguHgxmNGeoPgzia4Q0h8ip28e2zYQv+333Xt9vZOrZ0ycUa

2kEIbFqw7bCXRshrIQs8/B2m4GBq1QntA6SjAHUY1s
piNWZlsAr8J0aXoS7baYTE/t38CWk97u88mYjG2ZT+AdVICqO4Gz2dham60MKc/JjzH1P9Jje1kWRbEm

KuwVuezkHbsPC4/WKJBsY7yh6I9/+PWynyOUz1uqkkU8Aqp6Tf2aCwCMCrskruo5xnAZPHz8KEyHbsoi

jvPEqriPb0PA5Vqi4c7i5dHri19u8gVlPli04Bn7rS
HRaKiw5RrB13OVN6VZCkrbXquS71R5BnCa/Vyt/pOtiAl+ozYOV6txsHrwkY652buWUvwG2g/J+RecEO

AWkBQhI6jQWI45eOYbPccPs+2dz8pi20Yido2xLEtBzvEeaFVEaE2niOTm/oJVq7dX0DboqlIbR5brAr

FBxSk/E70o9hUIC4sfEfFZO2SUtyjCvdo3OcRCZEUv
4k/CC8LZKwLXk1986B8ic9Xn4K5Sgm41q67w2zcPLT0x2nSvS12GRQdpF77Kw9FxXk8YtPM7hfhzZ7Ur

01UinBOWTNKmsuLNtS+QvhAb1sCi1I1S7KVWQP5vwLBveH5WTSCHndla3Nkvq/ps/QvSEqRVJiCBfgpT

fDDNvDYtgsuMlq3WTmMpJfXx3dmoeHq2duv27Qy+Ps
cliQQZCSmbT3kHnHW7pUHwIMXz34TC5fa6MKAd2u8dT+lyWmNJsupUxMnaiwR0MKi7ZuK23Zqiu8v3P3

J/7a3aQw26uj34T/cjEEuapDTK77FszToESyguNVIJE5VwAprMIJlbBphjXyhQVTkXN5U9xmD6tsBuWq

nz/+YTXgtCIhWiMFS0mdWdxT8PXR4wf3JbVRc8MIYw
x2YhJDbhROe5SVieTBRrE5L5pUFtBYXpSE5RLPKcKQ5OVKWluaDyExKsJXHMRxRsXXFlAbRuq2GxS8Zl

WOJaMKYUVWtvYCIgR82vIRenLw82CElnVDPxIuGvMHc8Vw3YCwOkXCMqN31pqPRggDFzPLRkQQVNIuWo

REKnR0CkrUNaUYohW6UoT0IhEJ1igAZkAS6ojJFlY4
IpZ8XczlzrROSVUwYtFQHvZQfiKPHhFtHdYPyzSMH+Xa1XGCQ+Bq7GFR6mj8SrSIx9DUXoe7DjMZkoAN

y2W8QehDSqzwYjQefgoRXPGSNxKPGuY2aajro7kVIgYXW8Rj9GVeNkt9XcAJEtACuGjw1yO6BnoUN+P1

ZmF2spMhulftq7pC4jNsMF77BRfD2gVrnUuSlvumUr
dlx7ll1GXOq+LH+r8KvCcHyMOOxz3m8bfcuQRkd921jy2SnTIMEpy2NvMdDAU+fqv/6VL3mxf8Ot9aqM

rKI2rUfiw67JX1E05vX8Lw+Pjr4Gx+vA7nY71Sh8eI5CzPpc5/AmTV2IE+WX8Fg37n2dNKIfNAr3fbme

a4jrdqFkDpD5/OX3tcVlZ6vpG6KLdVtsRRz8wur1zd
1VFLeGi4vd2dluVwkwsF3t5creXqWyQnmzKxLdIkHV+VaQU8c1v9dS7chbVW9BnEojpBDvD1CIoYw90l

k5cFQr7s7fJ88EpSGhORyCc8kIdqjqWagy+dx8lEXVqqxJz1j0JS1ClqRT2BB4NVj+JQqIm6GQKC/Kim[place]

1HzavYkTuViMkbljEiQYawfYLFcg3PM8b3SeiB26oR
jZ6XhJnm7axXXgf7sMmTBYu6S9fEbPwUAIQkE5hG3Q4PiIGocBhnFMGlSCZSHCQOIM2deyB19JYDi6ZE

ciVquqEHQPYIsiAVVnmrXdBUwrDW3LSq9l3apIPUZSSNNZfhW489BA5SXcCBWftc31qMVfB9SCMg8eSS

9utIEoulPpkgelzisYI8kIn4oscO3Y4Dn4noAJ650Q
B2Mw3tGH/TDwU0sDVRN6TPfT/wZw8p5BaXoEgMzrWdoGeaYeTsEhSMyawFnH4x9bO+lQ+A7APWRm98FC

i0vbcBA5eoEhEQbWEyfY8M1FS0Mv64jN4mwkITJA9lm2NCDNYAIbSxtPI/5ps4XLSQkjMnF+qthnj7nz

jjTFt9PGX3Rx3cwcycNpyPXc1OWJJlzqrOwU25+vmF
F2XNxKvlopdOCN4iiAXOAlzt4rysbI1xV9yRLyFXy140Wf0zIW1W8IsTM3ksBbRaeG5iRGCXB+ilvX69

pibBaZhc/xBwY7N2iz6AX85mFk7YJP4lfmzInapXoOuvvyByKIOYTX9fG9NbLddMEDRVCLKWm0fpJNfd

QKIXX600CCD+Patricia/shoQGZcdEMPXH46aPyOfNKuqAbW
6BAuxhYXTmLby1tSzl+jAsMT4dF9Qiu9B2mpRIpTpfiMFv9iPjGw5gmVYI6IVxfuRdCyf02Yw9wbJKuD

eI+UNpBAA/+GDmZSUcMEP/ISBXa9DEMC8aSNI4FlYFJTk8MsL+r8B2LJr3x1L2br76GOxVt2AL8RhUtv

LEEDAgii5VQjFkBeoxGagWejZaIJIUC6EZk7hH1+mB
QRlzTSuByna6Yu/WOvZCnafo1ocQOHjDOzDhcgzFrouxY9LVlBT5qQOd7nHlgag6CWPZZxrZc7yrt3qQ

VIMqmbZAHwgcElpp7BFYZODeYpw+foVmK7k7ftUXFYEZXzHTG5F5WDsYawT8+L5FE8X3GyK5eCEpjjpA

7ad9Vw2QkuxKecYJSomXWjpQ3uW+qVRjiSeS0TwBK5
7Y6fL2wR/D27atneEq9NJDABK79Ao/UabzMSvqRJm4T27RzodHKmzOEAjbxcrrjjayOE+SYRD70BY+Qm

rVLvRX1lpX5w8UR5qTUPkeHUZTeIQhYzZNcW6TrzV8TiKZrqbEQI0P4DH2YIFbAtR978SNYa9rajMfNX

JrPqh1l7HdHJ8LTDTj5ORn3R11vbB0VJ/D4hMshs1c
uIwRXt8aGvRojHNpvV53fpIGcRHPwgh6eGduK9H1Q68V/t0ORBvW/LqzapqHdmnT0PD7ytXG2EB0XZtY

B+PL+XR2/DJZCedtpteExorG9ccnYC9YeoSj9W8Y1jCaN/kOHXsZL/dm06Pg+Km8jnV899J95/XjXYVO

7O48MfV+yZqt4WxQtv1Ic4pm85vCHYUU2bFILKA9co
hfvK/9cF+aldf1/JLymwn6KHIw2KL2rRgyUDdj1+8Y2iju7QbgV9nyDsyxcjtliQLEGS1O0noWvYLmMf

jLwiNkjr0krspKxZwiEvSUaNqOCEODceRHy6SAG7UO4GLDc6IrL73RNWeR/qDs08a3cdjr6kzFP9JR8C

GNtNNvrqi4Erqfs3Xk4bITaVDMGz6f38iLYP3xcTS6
tc5AueUqdexqC98BlAlAocQ6xth2mAt1+ufzPv6vYRvSnxJnKYaGOYLbXulJAbJE4ieX9kgBnDoXsTji

SDf37ntBsTiShDQksX2yu+LJIG/WUc/H+j9Mf38uNEI4t0IGYQn15OxpXfoqNH48UfR93Sygg0Qv44m5

oqWYRv53W/uU++MOtL380SWK5v7C4YtwDMcJ8L7xkX
ozF2o4H3AC7txzd3Pf1B0tgDnm7Vl9bZqJaEz0UIF6//MO7XnOvDNhW4w5AYxHAOhWyG8K5hCA9YTNlQ

mjZ6ctcTclJKO0kQhvVh6zZSeYD/XfaOWfpPY5PspysGaYVmJlZC/ZG2swi8pVHpWzgMrFzW+F4MIfG1

8xvUnsrp3AxAgjv/fixsn4NaJgOUXxKvTxI+EmJ9ch
QGteMCuHsqacwCxU38q2GoX4N75DyZ4+C90jL7lvBxzggspLyvl8vA5Y80RIfMJZ6VKhtAF0FsOTINhb

cBDWm3JoZTLp41yq9xVFJrv51UL0r9oH1KW+pVIus8wZVv7v3IwKWH1FDiSj4OH4WCi7Vnqwl4hKRNkR

CZxeRA1id+XBjl1F1M4mF93Lm/CWhSkzRNwKEXU7+l
WQ0ICwpQ28fn48cZpweX+PfRk0dJv8Fag5YGGmlD1ElIcvYO+qx0vFliLWaIASMsh88LTClGKoGs7bch

nPJZOsHYv5szBviEU/4xEmTaRxzT3bTSZlXy6gWwKcoay2L4gugilEVky4lxQEzgOMO2pfVMpsQE+Fhk

dGh7m0z/lLH23AcndG7al2NdyLalOOtOBbQXJOV/Qo
XgzSn3xJwsTN0e/FR6Cwtl/40ARuoHZvDcJhFGchSdRg2gRHsRNLMQ3uGpgDzN8ZESiKQsB9emvNDPQ3

wEI3RQ7J1FMTJI3UFZks7TqMBEIV8GIgZI1d/EmIXm0UmfFKSk1HQBAcYUbtqEXvO9ohZMJ2rDXqTfiF

fa7JbWSO1at4KyeBL7METe8i4AoFFbHTr4EyDNcKsU
pLkAhw+Ej3QJcZuKGWHqErDaCTtU6kaJaWqgMHGsACtnNXyRBMOQjHQGZsSaIG2GQobXzf3DqXL+eaSU

IdDSII7ZwlWzOkfnBt0QHDThKpV3oxNpsuuuVtooQZfZOT1Qjr6hzKJh2nWgmLinCAe7rgG3Cm48+daC

6NeQ8erXzuQkTSYvacvzd7ysY7r/v4oZYiDFhtIEYu
Uw19NeVV0ZRXCudNEK64u46Y5WDUYaQzVHmpPocSSmNUvfUex4jqc0MUma+IaV0YFgTjZZhHvq6heo7k

f7KiOCjAAmNNzu7iUiWBsV61rhw+A3QZrZ1qYXSg5KtxpejiKpxxSyUywi83jzrgMD9bdRD/rITVW0Qc

Gjj8WsHE2M8la549dt7bCj3Q515h/jRntSqNAz6knL
J73wYhVojhrQqQOe82GwQtzJZMNq32+aCsKz28cAKUZb+yzDbFsvnHqi3vYcxMgQIewSN1rIjTKhFTxX

lcVOlH9/p0VJ2pIjE3BCjrI55z/vFGv9T8oxL0ZgImzWogsw+0l+PtYrDzWs0Mj0aqRAeHWy8TF2JIp7

6oA31cfFTtpwK5/BN9ls8+r/I7d7i6enM/Mkw6WJ2G
UqrFlPBTBdBFxvVh6Lf1GCB7tEYxHo+C+lxuRNu+V+TbYgBwgg1dOD2B1iiv0MEmSl8fqah8pysPJ+OV

adfQ9x1QeBXAP2t4alxET84Lhl2RsOaqVP4F3Yf9fyar8X+hDwq1V0GcmYV5KRvUdtUvbhS3raq9GLiH

mmctn3wtfDcBUI5kPvgsa6PR8u247mgYl1drz0Uuay
0BN0MpmsPmsYnIgnndR7wSpPDjrkT3msNOwQm1EJ/LE2g/7+6+l/pQmPDRGy8/Yqs6a9tn4+l3IcqSPm

MaIqRcG65nwWJjI5A/patricia/K1PcT1f5gbu/9e1EEGN/CtYfRl/uMcbZcq6ueB1IuJIdN6x6P5brh3/8BOg

f8aR0SQC9vg2ZfDCErJVvjalGrLriBNeylUZTrFlaF
OfUzZBqMNxLhORJdBPuoSU3BTEnbROfkSHCaJ0JdqbNfcRUuEGFoBu2ATQOiKI6NPHCarUDiENAtIsUg

VDNBAlFiWOUbOUQasLJEd9plFjXhZPK0XXMkGxvnSU5HVZHlYP9Ft345SY91hzP4RTUpHd4FKQVzZG8Y

nw80bDE2FJZbFpVmCXUvufRyPRNkkvG5EN6UKiVtPX
V3dWGzBcvYUV9YRVGuiAQaUM2VSSHfoQWpCJ3+DQogID4+DQplbmRvYmoNCjggMCBvYmoNCiAgPDwvRm

hnbUKnUZ9UsTD2FOLlT87nNDBqLUWnE3UcDPCgWOC+Dk0MLLCvkDApIW5LKquU7O3IhtXIMI5ZVe5RWN

rMtDgrS2riwmwbAGwYKbIgmGNdkV5YQPReNBP+fUZ7
JMBckIYuuK0gkP+pEQb4K9y/9shr9JYLDI5kaMZwEzpH+hewXI83OJ4Iz36Y435Ph8hqJ6VN81+Do0Ux

Lo9Frjd9y3EBPBymDK+LPwbD+ZuXg+Lym1ncQRhnzPZiFh5OKgnkdpA/Qxowlw54wUomxClSTtffcKTT

3J942dCyh7Uw8FNEAvAfGMSBk1FAHyJGFyOxCC96ot
aj+WzyvXiaraaZeCReHDx+sefRlPQcyqogeGaHg6t9C/Onw36ULgOc4GZXnGFT0dpTZdGkmGVo2+1qif

yy79WF2PmE4K4KZGJ76Rq9ynnIkao5DeAsV1/ntYqrkYdwDTaisXjNt3riN55d9dP10+miDnWbPVaB4k

ZE1AqbRlNPZ0vxpQjp40ZvjO4NMogIH3EwiF+iY6Bz
h0SbqVkz75vApdL0dynmn8dOk0nvcPEgS6b45rBTQnf92pB9UXax+RS7VcBmQPoEyTYYF4jZcIIqLjf9

ILdJhgniO6YcGYArQXi6zUMporCFyHTUwC43jVxvwhBVIfBxjozEcTneBe4SFCy2kM77le8BisbyxvwL

rrdZ+mdi5aPeStX+vI5t6EG0xRmZ+eJy1/kdpsb+sD
0YL00GtL9RrvD1vF98WuvyP8/bD0bDH5r2I1mCvAhyMgRpBl4WjdTa0XzLMyTP2eVXaobQJvfKxEEZuC

bjw+omeQk5gqRfOyVHSnPtuSrQMt+syWzLUGoHIdIgcc6Vsau+qFvZME90Qlud4sBkJ+JKJcmG+tofmx

RPs1sqB4hVk/au7CWVtGIpmgtFaF8mjCmkUjnmf6xd
ZbayUBiqsJwLOQFyproqG8gOMiu10zkXH8IfzjfyEGp5bI2McvajXVPwSjr6GpifHDL3BV3kWY4kn+IK

bbu1bHtaL7XQX2U4oF75r08dELhLbOUcaYonxsIKjkMYlDIfLXCWD/pnLth4yeikB8QJuwTU11hid4OF

CVCXQT0H0Uw3r6Nl5OobcHOEcLHlDJ36ZJdxEvmAcZ
u6P/5VPGTtuqte1/Nr6Yfxz+2hKsclqfZtXlxohIDCe1BDiMWg0DmtXnDo69LJcmSNML2FGRpblE5YlT

wlz1leShruZuNAGRaFpwGKZNMtOa4sJgUWMMqvmY7xESXaVYHeqv3xCwnlOcA0aykWunfbsUpDImPMCc

lgcBptnjhkatJpUVRWiFIlb71s0OKufe5PdddfIsU6
LXgwv07d7FGXp+F+5UCk3ORXHrdAA0Ap3t4N4xjFe3uCR4Z+dD7+ZJsoLlPw4o2JpFow7jW6oUj7qdtq

fa9J817K1q9tkXykZ9Pxn7fpqeyLp88MqXE4Ld0k1peTuhMnt2A0VLfK4zTAdNvrMLAECmWpgI/DFt25

cJvFV/bsjXtXNloiVK2V5IarP0PQqYBEkMon4lL0Ph
LVfN6F1YIiMxPk/Q3puP73OtzhC1aX2KjuQlJMYpSllqSk1d9qRJXmvGR8kI1JoxtRRifkXeljrkoP4u

SqBua8RQmJeLvHjZmYYA+eqeZ9Fk5I52AuFpmvxcpyb+/lbNaHw0uANtOy90ETnwOWYNuDE+IprW5DF+

60YOUnk3eLukKg8LVPirK3w8VGUQV22lzHjGUnupGf
lRdhMkRXfoHp7dpJZePmUQbvwdhH1BX3dzW+5dBryr2J2rbX07LHFbL4+DBxnJYx+CGb2Enfn/mbyWjn

bcLX64aLjElWltRzvu+B3sodtz5xn6x8WcrMPYRt5M3CLQ75eeZ+CWN59bmkEwVbMzkDJhh/9HCdN50M

ya0QuaSFdeZa5kEZmmdSks4VGOarQ4eHJnLmpgFhta
fRxkn6zVtNP7VPGgtD98q5u5c5Fg98hYpgox8ByjJgWrx0JUEvV6WQOV0E8cy1M3ycXWLtaCcbGc7bnN

9YWBNPDe4SLZQ4kAxtYYbB7WJpqosl9CIAp0ixPz81mU1DAl4auJrnvtBCAibiLupiZz9L4EPRrY3k1k

WJuWeIZRqnZW5LjTmcGc3PqXyzk4bPWjOzZLm3VGxO
LdaEmcMfSavTgmNFobaoczDqyxQpabTHcvSG/RSo1zzc156rDn4qHEnsjUjRg00wLvc9kAbRLc004vr3

qxcqqUuyf8q7eAi1inYzcr0YxFAwBGj2/UuIiC4NFf/6GSYeo/GLCYm66yG/AvMaARPu+CcPuxsWb2QW

cutRjJRYghp4Q1tPqhLlkbooQhrC71FkbHpv5VvEiB
hMcDTk/0WbmN/AKdY9sjdQNOJ92fVBY57O+EVR6PhEBADQgzu99oWx8r4jKV42Wf23z8C8N38x12KVrb

gbFKMXN1n07iqwHDQJdve2Kc1cFrcDBUbUdpq6PZY3024qHzQF0tRXWJ+Z8IfBhzt/YOdWf8vCvH0Qd7

35d1KagYZTtqP3evxns1K9peS6zdPjn71PyQUsQTKP
zjcHS1qxBVaPja/14oxIe7GR09gH4hpqwrQLg1j9qX+Z5Vp5CaG39Ozre19C4Ol5R284SVIC8460nQz9

L9ReOYiMoSJzBMdznScQl8LfLJ5I0N/u6/YbnRulfKYOa/CsPjQQ5Ykw+MgRHuJY0wNiyuiv3iBV/XcJ

JsuTS6dwER0kxKI8IOrfXNlLRhI0Xx7QrTWD+KDqlX
8D36sXPY9ZTeN+U7gCclfGZ1oxiTpJc1dHQI3SwucZz9+6/IDgxhI3Rjud8gnNS81nwKwXYMqA/HIGeL

bR7pv8LpUIpzQUqCIqn7b58RPMVdF0Qd/eCnkWDMSY3H/xDVaH1zXw8FSOKd0GwEIicjUiKDunZjUL+Z

lJg8j35Y/+A6UDj/mfvn12NzHK3D4qqGQaEMtbS+3k
g/jNkDi4moyT8QzvZ8jFaQ+uc3RPZXsnq+TYWCgLVBBeE7GAeMwvarEoE1WsItmAeGhoib6AwsXm4Xb8

vIcx00jwlyUsaKnL/p0GpQZ1uilYGPHy0souZ0SAlWku4eT6da5R7E9nkpw+U9ugMceBtel720TqO40j

M5iS9oLi7jZHuU98PWGQ+okWVf8Uxa0LTN6iy9CbCH
XuTVosvjXuKaaWJnsiRKXoSbyVLiYpZOgFNrAyDZKiLXrdKT7KHQvjEFslXSQeK5LsohDaoYOaQJWwBa

6CQYJaNZ8WOTCwcGNbTMDjCpFlNGJBXcFdBJGqEHSnxONXa4xgKqCwLBX1TDKaGvcnLC1NYADdOP8Ag4

25KZ24mjQ3EFUdSw0QWFFlHL4Jzo12jMR5GLLgKuJg
MHXyypVcMOTeneM2IH1CPqLtUWA9aGPnDekHUS8PZFUvaIXkRN2QVVJaqKMvWF4+DQogID4+DQplbmRv

PwdFTfHjDONik8NiPYtaWHb2E2JifLVtuhPqVrjgjOZXSMJvCBCsG1oamnb3iABgQfY8Gg2ECfMyd7Qp

HVGrEXoRke3h86CnUSN676u8R0nfvspo988BcHPwI4
ZvF8vGyPztAz2PHkD2D5KmbObwu8dnSKAoj516ivH9ljpM1KHYOTTVat32vvWCWqg/p7gP3RrnI+z+Vh

/C8DHXcawtD8ZtlLWgzyFW0Pm+oVen95vJKdwMb7bJ/SbNJjxiQWc2YB9qvo8h50/Sw4/AzwBM3qs3KJ

zYXue7/0uBLgO1/bd3KAm7pyDYBEkkGE0+pmRO7FAq
PWrmnSIhSfkQIMnMNxGKI9GlXUSzg6zeH1aV6lS7VTrnBYqVV93b2Hn1dj4FlgP6gw7c/Eb4ULnXu5Al

WvQMKD2qWRvIHvukbD3UliFLI0ZtJbP3NYom423WSfxg5MyKT8poeoi1y8q66eC3c5Y+qLB9owWeqQ2C

UkeR7lL+hUiIyRSC0QuRM53wLtrbQ5kSX6YbQT57oF
jTgQoC3BVxqEunTHkfPt16lo+/7QznltwmwwIV4K3oNSQyF8ZAk7M9ADgeCDoZQPxJxfcJZg6KG2CmOs

BkWlmcsS7N4NeYTlactc3vstRnfHC4DQgE2JOd22V1ZMetX4J+Wj5YtwsTUuCiDgK39DwbpmMCIWJklW

mciIbwtP2DgiFbvRm11stnQFmjUGWwKFB8SdbAsp+9
hdiqeS4kPD/pY5lvcJ5HZVK3a5i0z9z+w19GlbIWkDNa/DYrAeK6F5+JASAAyVvWPB5Dt5Y2QqsxgCZJ

+LJU0OPK2Q4skITv4IEZpvNBerTD3ohxRZq6PTV9qEnr3OBy2e06YAZXoFD58lRkJmm3DkPFAsMdt9XB

rNDi7zdGVm+3XFvLPfCsXbED/1RDFiM4n3fov9Tcuc
0Vw1ePH4c7puL+iN3iD/tooikS8mQCM/ClLNoXkwzQ02Z8YlOxH+u32zdUsj0q6KiM8ncRIN6qFK0Qsg

TOysLTtFaFOlor1/D6wAO9Kqc1CKHYe9EciKjgzl6ehpW5zI21pw06CF9UvlUUaTEoygmQviKzc/tVus

8XgFNTjcvCqBsHZSBzrdxyEDrKtnBZ67skzvU8K40z
NyAviV0calPWpx2L9G2gRESruTzYUyExgoXDZ1iHQ924tBEk+EGiKbSauCEb2W8JI5AEJGiEwNFmC88Q

mpfsHBCK3npHENW2RrSHzV7o+75pwzs+AxVohVsZssN7efOWqaLm+F7EjlCb+TZjgq+cG4GVq13jKEqZ

D1DB7LHXLDjQLaWTIKaxohTmLu+zHe8hVYl6y91Zju
AgEond9sNwKkeyVZnq8Rj258qQkriTTDwvF2ybr2YOEwIGXbczwAtyzBgriiHBHqUhp1cLTlihScAUvz

xqM8w4ew01WGc6j7Rhl2X891vc5Tqk+qDX4GU7/Ow2NhpIPY35urfL27r85holji3QITxDXibNZeRq2s

B78trIlkaAcKnH/GMDwsG4HStISB+9u6+bTqdD2xNi
FFgOY+PvxE27hnfxeC01kSuDOPlabRU3iTRayW9NZxpM0mBAKGDlSmr0WoTgr8X3Bd9BuEdwChFOpdMv

kBWHxPzWP4RPq4W9l4Yy+eaB3O4P8UDDb9EkzhYNIcXb5xcDGDEzqSkf6KeObpgTr1t4Ka0v8vtab5D4

616nyiXnIk0RfMU3haje14azntZwIjl3h1Vl2Z0NhT
CtSu7RIohD/3TLBMzhJmRevJvtr8NyzXKfRra4GbKU98SNMZbg9nEDMe/GCmbZODQiIHA9AvtSMfMlBm

+eFY8eR+5hDjlZGYQrZLuOiqWP7C18hjxBg6cwS9zF69SK50pKoK0LljCt3AJ7YdYMu6V0ImMfAYW3FZ

leh3ZCUjKxRKf8sKRAupLbEHAMPvVwCG32FhrJ4JG/
3awmngYtypVMyWYtKMsZa71R+hsvm3E0/9HuFo7TTlJTuXKS1Tvc+StZDHQwJiLgLdW3/VpoeylDX/Cortes

Klt8ruoVCGtkb08CsYkyIW3XZYBhKR5qMnH25eMQy8B9A6ESCzT+/TIanxZEFIUjhm+N0z+25TlRXjLf

uC+ZYbvPdtKK9exwl1UZoGEFc3p6HbLNKK/meuWr9y
gV9ISnM/A7spnWvFLxj9y18r8zDPYXOqIU7Q2wm6lWdcvUIAfiG/bThijfYpiFd7NXlPaJX6+J4bIv2s

XdQP5D+27UkYPTYaY/FKi46DWSiMQGHj5Oytqez2MdzRMuotHdeKWLxwPpDTmE6QrzcxAk6IT4TLmoAO

kViVHy2d30CKovH5QRchxA9+Mvw6P+s9QinNLbc0t4
pbPLhPp/FlbVoJbqyKMfw3H6XmVw8Wly/3nQxrXFwQUEBZIA7WOQTdSg8TVbufvXn/+E6sC+SLkuKR+i

9ONa40vXHpBfVBybufmpmDgZp2ZdH0n/Mp7IAWHtEcoCbvFqfLZ+dSQwew7NZJOoD7B5v+f8EURLVtvp

QXcYeshDzTsmsr8hvJt78gGJgDmmk3AKE8uh24ZqrR
q8yZKh0bexEgm4R8pTG68Xj5MoYo5e4+d2OvwF5A3CXzTZKu9JikjKSbIzv6jZ0H7yUZJt2Ys/ymDwIP

ZclIDjsA9p0+WXCIzS58E9Yf3l0LdoXZIfKB4ExP359hTGRYS2A3cF5y42W7mXXjrxKjenTBrP3jHwdy

fzbGE+Tfef2Xk03/GO8sVtIX4Vd7HyXcZd242PhtUg
IPAhvaXFVWp/05CcmohAKd1QI31+wMhGefV+WVr1Zkwg9Pu2qs6c5DP7RMqqFqJCy62duHxI3bxEGyYl

Byhakn3e8UjiXXJawjhkJvEJa3ab+17GGqaIj4fW75oHDiru8VFXZuFTz53xVqEpMVHD1QVfWhqKd/Mm

cP23wITdJCB2xybQDvybp3idl0oDAwX9zPu5LUzf9S
42+xEJ6LsMF2XfEZ4LfP96QqvWZeF2q6RCiW4iH3dRjpAQuI0JVw/yxOc4fVFu6UsMrFGQ2d0UmjYGzw

uThT6OstCPh+58ndQIFqzzzM3z88ZGJn6qxQTUTQRqlk+ykFu6zHKBMLmXsrOt5RalUCW7+tA0JfIye/

6XNgM6OIzfeiNx+uddh6abcHcxqDXIJOkvAiEsiV13
e2Vk2O77yZP+7C9CYNDkyvi+pI+k3Ef7y0j7Y787SNTGaG8znBO1A2p6/PgD+dzs91RrXTsyyi1/xzl6

Kwp1/1P0fa9+l3On/aqGKkzoW7Cg9c0rV4Ua3jKCl53ty9htcWqIu+zAo5lyPanvyFfyFrxju00ku7ZR

NeTPC/+3jHnAdfBwNFTb9+DkhwRT/aUOc0lm+MEG1p
Un9FTKuQC/6pcrgFxeuX8DMu7IMTF+XAF6AFKAmFOw08VXjTPz89TJaUQo34LLSY0zys19LGVio7HfMn

7rUoAAjbUdxiLKYRB6ZVxvkhSeVUbwzpmf9KQWbhyfUaOXrMuulDUCiekbiDCEmhpbuEVOosmvxzmK6y

JsBAs4QRhh2CSNPJdmK9ElLl9gV4YgDv9mH4ZcPa+c
GhitEn0p6KpnDLRx0yQjcY5CuczyfH5DxztpkO6JptznqHOzxdwbnVHMwwbYKm+Ap6W1GBfd8BJ5OPsx

1VO6ZMwg1QAngzVFrtd3BKmJITkoMivhBQ9TBqzvDC1IFfgsAE8GJuvWIzcriFm/Rgm21LDCPsVmYTA4

diMliT1EIS1kz5QvPSylWTYiDY1lrl2BYXX0QJ3MTG
InZI0HjPAlE3LfQ1HFQlVxEAYdNIEdUS99GVFaJSUBEZgqFPSfO7Amm574inWrnhPkDHZvDy3WGLYpQN

7TESXmIEKlgGLzEOBzXCVbCgN4KICgDKwpKFQyC7CpmcCcskYzBFGcSBOtIg7BTPFlQD5Tie11xTX8CD

UsCmTaJRHlkcIbJYKgfnK9FY9ONkFeUIN2nDKcEfzC
GC6OOWXzjBFtQV5MJOGwoNNpPP4+DQogID4+HGbverMrTfqPShGvXQXjz0OrWMyiGRx0V2BiwQFglvNf

KczacOOGONUpHKEvD7ylxkp6vWOnDor3Pv1BOuJxw7MqDWNtOAyWzt9m15WIZrW+p6Z8Ahr8d8GqzEC+

jXUKYOKKo5uRY6xSIqRHBQsXnoT1wqv4f3fvHO0eH0
ZG2D65s1tsvwBZBOY6tWC5TO6aVn0AfQi+/GQ0nQ9Ebzc5Gn2vph0CWfmx/3MJh3rW9qMKs+yl1e/9p5

ju7YaziMlTp/tP5/Nk+Z83U44bC8XIz/QHNxdp/jkQonrOvGzi4p+uT+eL//efCa1dl2+dsX5nuhhtr9

qssECe/eraXyLfHZj+73aCQwTOO5jTdYlTybnUhrEF
3q19IC9Hl+w0aVzCol0W44Y0zQDqnBzlwVZq42z06Vxk9WaWJ7jRjiIxJhBYO5tZHpPnHVo7G8gX2IyU

6JIocmoTqzkfuV412qQLUHuE8YAgYLBCl1tP9catGKz4u3f216tbteCkFuqqFiBrApnTHXnFVX04UF3u

rzW6bjKJkV2QmK7Ve20Un9RvF7LvSuGA6qyOuQPKqx
ju3X/XBoA1sdkaCEyz74HMwoV1QvBGuqpQdooyjAGvFMJQ9HiUzzwcRGFtQGyq/BJEBfpqQF/gz6oNgF

uE6hWtGTGqO0IfdD1jOKz+O9uKwNkwgczOnGUuBdgRLAhDl/VvzPaWk5eN6nJzbhY+18D3vdMPDqp1r1

Y+qJZEd9MZ+01PeLc8BNfWur3jOb/5KeXe3qavLNfw
+y3XzuH2q0ZQdHL36wFKFMOgYl4juDsAfYOZqwiH9fiTrlrOuyVfLnZcVxxXmes7VqTL+i59+LJWgiqs

cImZzmieWeeacl1jv2BVIt0OJWlWlAsL6fS7XJ7uUzjxW72BCKTLUbD3c9Y7uCNaZbX2o20fXfm18aIL

fpI+R4Z7RHntopLESdMSPd6ERJ5JnM5rtBk8BZsFLT
OjK18QW0sNhUhyfw08d0KV6zyE38xvwqffQnsEBhH4SV62YYnNRYSXJvynX9AXDHW49C4xbGpIy3iTgA

2y6ypGDLo7RenzO3J4JhPSzqCwGJa9C++rxUVf1L0Vo29+t8dir8wWRkxm877Pb866VcREe25BqcGorv

e0lKw5kqx/ZtcushDhgtHR5lQwKsbA/UWyN8yVVGAR
XDvcZUbqcPqE3KKoL9KMCCQY5vHBHmGNNocmIDVZQV5VfTbw4jD5CIaXk3D76fJOS9BnM+eRvG5rX21q

UkGuJyEfmx/+YTVFnHtNNtRfLN2x9xEQUL5xn3piZBx2ib3L0RXkxeH/1rKQ4UmAToepIdjAx9d+HKef

AnZul6vpaIj9Xs5O2/u6qTOA6UpmvelIZDvrZIMEgx
wSFxL8CzXCOYp7FcNZmlbMHo7tHcsBvQEbtY7C14Xdz80TgSa5VH/X22l6OLHDGpGaEz04iZpqoYipAp

Ib3X0yMDKmFXI+4jwHULCBEiYn4J3GSJhCMe7ZDF7jLiGbTg/OvGMxk6NrJz+EY0WhWbnAFx7JsZPbYf

SiYNFIxZ8LwcRGMsK7sFIRcnxVJfKPlDZxiNJh4Fx0
lh+4uw1q8Vagd63JSww9Qk7GONlqvIIM5hZ9N150c8+701P0Ox97Om3PK8AxwoYwUqxLxBEkE+Or8dy8

Gc+Tc21/LZchljFmAkh4Z5eMw5d4JP/9xIyrRW411CbZ0iqrp6KD6isj3mUNgkGhjmpoDwpScq1E+lwL

ce6HCi3RzN70goPN8MIK2DAmp+whm8HDGmuAEt4WxF
+WLCD6SkZYvhzdxXETnBIoW9863OhhSWnLCFR12NA/TbW47GOoDyIz2tKNZmAz/ylTMfTjPNZhhWkco8

HEnSAU6Y6Gusy2wQqtqKHuLif5BoSPiqnWOCMfoaAsWVHYgoFy6Bl+ILgGyI9GknGpGjJ5ogdIZ86OcJ

KccSYhMJRYcbiuaBEiL4/0Kf3JO1eeF57lk9XkJaXv
BGFS9W0pnLmUFZKLLlrBjEqcDNJQJkG4PASvVjZVZcF2BcrOoDELYiMAsIDVhStnQWlIH9bqREkmrSlB

VjskMWc7HmjRA7g6a/SGSIh8IRyZHb+id1FGIFoR8oKzvzoGsLsXq/EPl4gMm8YCDHTTxlYnLzhylVOb

IBBpyeJeULBvimFVDGFtFwgpsxDXcOFsCMMeAVJGI6
yUuU3DPH+i5VWDbjWvNHLKHM8ix0/XyKpvOSIw5nuXWiNSzVLbTo6QelHycX0TRYdSxIXdr6L/3xWkra

hZ3W8R3T+0EHq3FpGMBseDUinF+Q7ZWERmRDxtt6lCQcdSD9ZIi5fUG3+vZ03hdO/WwDPhhEuEOooMlS

MzeRJ98sVbgnns5NUYLUn15MmXSUqmDVF7RFy1CXnL
yCU5G5D1F0kdzzGc5WBk2SWIsFkjMS1j8BCc5XjHoboxQ4ntp7iPHImY0BuvlyIIWpXVn4XSg5XNcwSl

BxH5EFbvtO7iOkmsLOsHmE+ZnEl7Xljlogn4seDD2eGFgFSQU36DYazaXwoH9t9bf5l3oSSUljSDVlMO

yMHrOYZwjamMqU4qM1v6MNeq6Abq8nTsVAaPSobHRw
PZGlEKZkZ0O+JP5vn7mge6CLZYm3aHr34DoUtLNFAlgIK7R3CGhlXc9iW7V1vs3456wStl+q2OWkpNDi

EB7G2LGhbFLDu8QCYKK8jyQzIgRwLEgWY1OJBQTA+cu3DEU0yqwPmvJyDCFYZ1UKUhqKDB4jDF3veS0L

2w8kp1QIHKYKJhObqs0kzSO0aELhCPOgQRiedyfjbJ
XKMavFRdaBv5JjaH+6DaUSSU4n3cWpO6osj4dxT8KKhYl633VwP6RQVYJpHVe4hF22zzY+tSuTVx3rtS

swbKw8vXFiLoXu4uk3Ekb/zjHtQ3gjR3/nxubR4SYS7YJ0qNw/+t2A/AvticKELF5YHLuAlG9a8ZJjjo

H1OoXYrpLFkCESHHxNtVb+oCDaCCpqX1zxohaEEqWO
xPFGmcqgFi4he1Ji+6TqUW/Z+T9UiM1DNgAzITxviTevkcphZcojB8WIljOKYBxAwrGGRiEz8SUAn83U

ZQHHvb+zy/TCfj+Jn4YfZyCgzAY8jPo8norqVyip0z8AanZEpd2mAKWs8HTTkgtMDDQxAq4EeArtE9Iy

pqGkV53/DJh/CMrtvWSzDKFGK5tNUpzlGlKHGADcBJ
5c9rUdbQviPQ/m2XeArTCihxY2BWVWZOHJprBdrVtT4YQ6D12BvixRXulMz59dUFpkoZMeJvmZKzAMPH

dZNzr06fwHyszGDIgrrtYfVLzBAfLY7g2s48qnMYDkAfjy6h59aPt5rOPNL0bNLvNWonjEQXT24nXU3/

CoBsUf23gDlS9suaUjCXW7UtPNXGo1GcigMD/6TDY/
uE8WRX4zomOvzb3jrPFxRj85GLRSw3YGgHjEtxI8Xhy24m1Ca4Y3EPZfXuSGzIXM9EjeRUmX3M/J82l3

q2I41hOEFuKQO9XwaBtYp1bIJqW/Zj08IHGmWwMQrSNKPfoXiTWgp36I/UgoTopPUpz179u3ffiisYvm

H2MGSNqfJgSPlcVyG52Qb50hR24zF6F/DzPVuccoku
bNLbL0ssA8AysFiSFe1JkLzWzkqkFmW+J3oIyzmo4tSD2eatdE5hS5RzL9ugSpXWFW94gST19Ga/5NXh

glfeCiUv7GezLponAyEUaOlEQNODBnuPP0FlhhW+xUJ4wmpOdn4vuYVyplfJO6s5ruE2yhD6m8LjgJJs

La2hU20WFPcoIuDL5QG3TGBJyo4JmrNHzd/JzeuCMw
auV18cKuFrmDemU7xQ5E4k4GM9hq8gH812Sb/qQLKD03CsNt9Ec7zrnamiJLnritjTzOtJqQdwVG9wiN

GQXJAhcNs1HEjCUtGbom1ZfZGeevRnSzWNLC1b8euxqntpn9Z87MKXxmAiRnwIroBvZFDoFH2P1mmpHt

/mHSl64Tqu8Mm8lj7Mv0D9g8bh9CKCMK4Rl5etRfV7
WjnWKaWU9dUhTidiMFxPqGJS7Kn7dIQKQ9EuNUlPfHvvehljTzc5eawXwMBnpbqF+luSYtg/bE6p6y2A

+NABruri+pQml1gOXSBMowkuD9BsMN6PJCB86tfrR0lqrRSkFTJov7FzECpnHYgUlNkHmDIX8HBLJLY6

9h4k1MgLSWXnSfxhdLAXCEgvQi6Ei0+Y6dB9hsUXtE
edGvKId1uNgfLMLa/4j9DNRD3Eocxbq9ZhNEGdFzs6nLaIXtPhdfswem2qERdmjktZUqQzkFVDDhOfXt

fDS1DUHMl7z0hOXLOo2ZDQnVL9jpl9PtrDZ2s2HW9OJWt7KBf8SlXFng5lAxdxPXe7P3LdvRe1CB9iaI

JMBugdJ76zAzJ/Vi0fmaxpg/AnJvDCTpGtwZ+YMFx8
l1o1tDdbLmV+mN3sGHeKl7w5e7OWrJQmlUREEe79w0wMvoCs0BAjg8/EF2aZ7mTA6aWB5TPlrSvv1/8D

2pJJuA6XPP8tb1GgKTZxVClsexHiAsjEAvNzYJEad5KvPSijYNq5RCeuQLUzF6N5oIFgHOLuUR0CXBEv

XS4MEIPaqzPtPgRiMEIRDwAmEOKgMnZka8WtP4RgOI
KqWOUXYMviFOReK56rQAchGt13XUtxESSzZfGlEJz9Re8GTzGuWXQyQ31cwFMawRYtOTGeNXGUDFonRT

YiN6toh1TqWFh9FV3OUZ2SqiPit5UbhqLhY1gqS5AVGZ6DGDAiV3DKR5TvK5umDyGib0SuB3dbCtLll9

KoSz8XWlUdMd1XNwVrIZ6xpc7FWXLaVDOyWjvSMtDa
ZGeoWpsnbKBgEM2BzUU9OYDhA09gVOFlJKDuD8QoQWJ9LjI+Qz1OLKMttTGrAY2HGonR6Z2uv7w5Mp+/

gG9ZaSYqmGimrc/mwjhqr87x2gj5kox+SW4Fkzvmu1AmtYJf/mz6sGTLUfmk9P+0WBi20ySYlMPWx9oY

Ac0taVpT//moBoFnWZZa/qw/WyPnEaO05P7ZP3ol76
Wzy5oR84b3fqKhNHrh5PasJweQCs0lN4J6m+H5hhph3xk+XsfscFoiMDchk7ys5R8fJI3KncKnCdk462

r8e1wIcU1GDjGvGr17+kvk+6E0bJ2ktiA8cxm6r7Gre8sqgyc7ix74rnPE3yM9zt7ua4aiyjKlEqsrie

qB78siCZ+++UaN/6MOx+iiq4kr5c92yjsWFbSQfEeh
S4oflwvIviMjYy1qcJSuzrwR6z+CZ40LiUu0wk6KUSvInnqt7iILH8Sjn1zJZCF1wpm7xaccOnPvYRwG

zAbPdNd3w4E8hs12eVsxvgXfWTN6VLD5NV6MCXQxQ51gfwaWyWU2q8SXZFvnrXxGUYgyd+AreBi74gTy

SGYMAWviFhrivXBEwTkOEilUwrofkC623uLy9aGdpr
yChKiAY6LogFhpv8kAnKQvsuSYhLE/kCPh3MyrGzPKsaSuhycjhxxvITStvCknoo7ozT2WTUn4NJjebI

GgjtQ5P+vaWgwuzmzinGL9lZC5O102WWJyp3h3Tposq5Mva8Vi+O5gjn6efVOZRnJJXxcLiLXhbHCFnD

o5I2zZpikuTyKriE9s9SQ2yjylwwXS5XCrZM8x8CS9
r+wMmgfmqCMFK4rBJCzHKkJYLr4y4wMgrv5SL2OzM0H9PqnSKOR0bvmFHYSlHt2vMHjTRVt7vanCmskJ

4V4jVQtrZ3oTPGEsAXNcKaoL928rhjZZAD/r1QsXQ77jvyekJKEXXGC9sIolK7HzT8sGJbfSz8IKqJaU

QwrdXM9Jlt/FW1hYquTdAm1rFfExCIitz5Tc7Y1OYY
sn7JTzqx8YaH8AI+9AquDZ8seP3LUoJsL9rudI7i2wHvkAp0pT1W+GiBggX3LCb+9/xPysSV2bSELpXU

yJtN1tZiW2FArLkp+eByWK35o6v39OHGcE8t0SxEaNb/3455vuMTLuaaE4D6+Pj07V6+PTg+ZAf9BvdL

3x6Su5DrLtDe97bl0bf34LflXqxz6xMxzAReiu2Sii
sCV4Myw5AFl/RJAdh1lir+dJns+Xb5FFQLCHrSMzxL78blkJjsrM798flVvXGiVKiRqAu8bV8l0JnZrt

rzdYCS1pi4P1KlBimR/krYVFCHUS3OtVu3EhlVwjWOjR0UONVE6+1BQsnAbcKgVAEPR7HQW8LjawFbCl

7Eo+R2aNHwZS6nQSZiC7ooYtzSypJDot182p38o7OQ
+MNId18MrBpOSJdYMQsPZkOgaSLhPx2TjHqi2LneLveDYmSFayJEZstZpYzr7xuk0SquJmKOehIt1ndJ

8EAMBNFkvMNjeAuvppWel1KKae5WzrQNFlE61OIw1L+71X6xqWHXLIRDgnJrQ5TC1EtAZgAL16GYTU5D

LW9C2PpYFoDC4xP08IcRupDbBEFj3FgNY62GmUApgV
1CH1coppqNvHB7aTEsQzWxjeTce0TobLxgbBTH2IslBBgzv6LvoGFwloK5jdi/WOperoAJOEd4jQ5ZZH

CZ+mf2nGxI77TYWZsmkaiJOXkC9Q2xShWZP6JEVEf01I1aS+ZtaXJOvlapPde6kV+uo+q3QhMA2/lt7n

mSXbpnrtcv3AXMMJYLbT7tagQ+o8X7NcfI6gyxh6Hw
Hybq7ZJvtccOZ+7cSBeluywMSBUz/E4q1gG9+LxfSHfboLZD1l10s1ZtSS4hJdS0MXM9oUgPHsonfzT6

at1klcg0vR+ziHcDvsyGPqkh3rNYmX0orZxsByu706L9zwn+hUmSdGk/E0bCdUitlxBqESEf0dNMdDOt

XjwWhPgYZmYUOEitMa4Sl4nMPiGr9bxfszYSo5/0ih
9Wr5mkHEnoIUNtCHZkRbZLORjHcjbORRBmwYTjNtReFnvArfO8barFI7rq0ftPaBdYIWzom3cVAGEP99

WHqKnvgiUPmBA4vVxuwKfQkMFE9rsAOX78DUOhpJhpEN1lWixtMYhdoC0LHY8ncX/IIosXus/Nxqp98H

yb2E8ATfEUyXTJHiI4jbOnnGOfjwBAGHZ5xakUOwXd
UTR7hTMGH1pRWgN8JvoRYXmAv3KEvX02ck3c6R0dX+b3OVTIyaVW0RXViEfB8yuCJlTnAGu8z5N0ytpA

IJh1VAMQFY1ifMPi6jO7JLjr1xTUmcGEAbUrv7Y2hZIE/fb9CtXd2B8SbVTcvtP4BtEZWKIA8RdJ486E

VxjMYfLBvLI4rvr5hOz+qi8dW2ZdlB6l4vB2KOFuYH
Cu6NCcyYYyBcyuV1jDlXd6UZ4pcCutCGHirjMKoUMOHu7vYwseufzV0Gmygv1FavBNJkS0TpHRsEOCdG

My4xMrYpiJv9R7Yg9tjHIiH2kzg2C6P39q3STHZiYRqHeVqUDP8uDCaUHMhB7+DmcbaTIn260MPLLiDq

1gvKvQ/nH33seDVZvEAVmql3wr9B56e94wet0jewfQ
ExS+msr8kyeIEoirjkhckGkagNqyv1jsjptUvakttr4yxSr4Yv2Isuu1ayg35WkfpuBQ3j+zj76mOXdH

ms72O/Mb0JhFeg98x0ylfaNw2HwW/iPF8+1KJkI7lNohsAkJZmnxjD8gdk6OqQ6gzZAahCNUf2+bJsvs

KEwhYynaQAccu0k4elnXbR1+hqXzl9v9E8++ev9uyu
BMYlWO813l3I9TIh+XWR+2dxoeW4UXCxJNrHb394p7Z+LYy5rpsznYJgsTS50/fcC9w5kT+q+/lw5/P+

W6jsRi6HIKqPmrmqWf0Vo8/5wG7mDWGlfRakMax7yZxPt8H8vdLnvUTUL9vqg1jOatuM8dHRJTjbqBKU

1hebDD1n2ogcy1rnxflOz/G2EFlLsbk+DbEX7ZLdoh
LVX5hYbFSNrioazRLvcSZbb1QM88IZ/M+QofuZPe7/i5dys7edJ4WGhmRVDZeXXtuFi03d5mWkiSq0nb

LIwfSsrB8DJr3qQ2utCTp2FchZg1puDAbpUIP1meqXGYQiwNxFxhS9UZ0BMS0p0mv6X/LbvspkQxKImB

Ir0aU+3TcUjS8CGHNHLjsnokDXkSnQ6mGcW8NA3xog
VKNbcbTlNbd4lTqHj4xrBRkzZ7oCmmqGXjm1nQE7v9zA0Sc7oWnL/vd5d62DDqWdQwuQRU3wtVPhLcNX

AHFav4XaJaVB15gngi15N707zSSwzCRZujU8CY6glnMU3/6PifMjVWrpT+QXKw1KaRwxn2U602ZIxLfd

azkn+UCK2WUsq6rgQTWsTaW8otu4ZAmpjBxSRyWcCQ
U0odApxT8RME1tz3NlXUenFMWuYD6fny2PWNH0GZ0EWEVpND8PkGWeL5CiT3LPZtOrKTRlCEObIA39AO

WxYWUIBXeiYTOeT6Jal680hvAtnuSxNLRiFg6XLWRfGH4KBBWmDSZtfNZqJFIkJMDuWgQ2BZTdCFqiNC

KtU5VviuHqwuWhNXU5QDQfSl4JOEKaXB8Ugb89dGB9
ZNGfFnJkYOLkhwGfQIHmodI6TC9GGzMpNEW3dQCqQdwIOG5FKMBirJCfHQ9XTAKamGNwPN9+DQogID4+

CRpsngQlUjgTAwE4HZAvp4RxJMchSSseTiJfINy4WDJxOrfwAtx1YAA6WUU8TJRjMYC0GBc9DNK3Rgxc

WStsFYAtPpUzXvPcLep8OUF5NZLeMkibKaSiTFJ0OS
NvFosfDKS8MRG4TkN6UPFaYIH0OCJ7VzF0MBPoVTJ9HYX4OkE1JJPrPLA3CTV2XCEuHncaOVs8AN9FVN

R9CCEuTNn1WSW9XhLeGUX2KYG3DuZ8ItdqNbNjNTpnDtP2FgvoGqDbABx8STV4IaEtYlv6IOStCCW3Je

ekHBK4YJiePqL0LsQiUbl4LPR7VnT8BxccQhVfOVI8
UhB3JOHsVvDsHQO8WlN0KUPcYeT3DEM9SlD1KXYyBWvsZRD1SWQcCbbqZry1SAR7DUK0LVMlZoIxJPR0

IjE5FDEbIRGpRCD0TxF6ABJlOqq8VLD8DhZ6JEKzBaDzNTGySpK7IREkAeVfXUgdYjM3NWGjRKF4EEA2

HqG5YPGhSzYsWSAnLYRgZpkdHFE0OCAfAZA8GhJkAX
PrOS7VCOA7RCZkYAQtHYIcVBUbCxTkXpK6IPU5SCL8REVnJaYpUMf7TTY0ISQoQpFhVHk1WEC8MISpWz

DjTBo6BCQ4QWRuHaPmDHv8YAG9RZLlYbWoAIt0TKY5MATkKlEyIJd2FEI6WKWjNsJsHMo2EIG7TRNbCy

BlFDr6PYFCWtAlJkUiDUz6BVV2RWHpIjRuUNm9YUI8
ETSrYfJpTDi5FIZ7UAPpCjc4TOXdJsF1HJHxBZS2DOV6WxJ6UBBbVqNwFKZ5MrJdJlUcBlE5JLP6LTW3

MPXyGXd3MREhDxT1VuzlGNQxAVBoRQU3DSzrDvAuMHTfNhXaTvCtPTpbEFN5SuD0UfpaAyLmLVOkQvVy

PzKsPmO1USP7TwT9PhTtRUC5JCfxWBS2JOExZmS8NW
Y7SoF2CyctMjE3YAS4CzN4VvteTGQxCFK9JwPmIzE8TFW0RvQ4FukkBlO0PAH7ZBWxFnjaGyc9YIZ7AZ

G5GiXqErXhLIu3HEULYcYsCzm4WTg6IGZ3FettHcu9KCA4NMX2IvatXzRyPBwmAxP5UuHuEoBmORS4Dm

B7FhmzElXwAYH0SuF4UBPsOOJ9KSC8VuB7PCGuFGI3
RPx0BVA3TKHtODI0VQT4KiO6ZTWiXID8COH1PREzBltrPjj7YFH1PMH3TEYrCSjzHWU0XlN7UPBtGND0

WIB2VfL8FTYcPIL2CCD6YVN7TASmMJJ1UTL0WlH6EXFuWSL0APFfDRU0KBBzXYZlIB8nLOztemNoCktP

PnL0TLZue3ZdVAdsIJe5IHfkDTGjZ2R9tXQsYu8ukQ
Lsi9UgxVO5m8FDPmSfNDRcIx8voN6tpZDlAQZpURuLLpGrHMMpEEJhHJ10JFjlYW9QZGLZWRcvuBKdIS

N8G0Vex9BfbcCyBNTnOe7VOEDqZR2CdSLwtjBpUw6ZKHMvNG8Fo281AaYkcRXwYAJrIaRrXHQzEAKcNV

R7FX2BJSJxOR6KhYUgkLYLcytsNGGwW9G4OB9YECUY
OjHqXn6UGzAoFO7wdq4VGWbeBEZfVlkLZfAgZXbCOjHjIVYrLGkaJV2Dl023S0V5TxW6qZBzKER2GUA8

qMPaMdIhGQQcfiJyVMGfDNxuDO6lr6NxrwloZ9yoNG2doCUzS26dhK4fUFsrJHXqK0AwpaT9D5uagoDv

UM1ESAV6G9corgBsPHZXJqOqVVAvX3aslCfiVRI7XQ
TgHt5ZKJPkYJ0Hk296IIJgP3XimRZeucGjFrFnNVVTUyQuXf0AJkUuVO1qqe2OEXjeTXYjRsmXJoMuRd

V5CQZlVmNrRAE5XFGyKvXeIUp8NNO8PuJ6LXJnRXr6MBqwKtKxGsqwOuSxZKXrQkPcFAsiNBy0LHT8QJ

HfFfOwNxb7OKW6QRU3LBInWKL6MWM7WcL8JSKnLPP6
SSY7ZrM4UWBuTJV0AKP6VfD1SIKbIgSfJSCqBoU4DEWjKCuiRLG2PVG3QVQcQaKqHLc7BPV2WyEtVqFw

BSnnGwA6UsYsVxN9BHIcREK8GsexGrVrDSF9ZFZ5ATOuOxSiMQRdAUQ6YjTmQkGiYYy2LDF9NfyoDho0

DWchXwL9ZhuqVxWjXOcvSuG4HvntGCE3RLM2LoM7Ll
faSjLzUI5VKLZzGmBwYbj6IKUzAyJ2LKEuMEL2GQIuWkJ9MBYzEtVcERN4KdR0RWZtOYP1RDBiLhH8AJ

NlMlOsKWH0PHZqSleoZZL2KWY7JUU8XNgwVkEfJBHrUJI2OUDzSwThDTB8HTV5TOShLcWfALKzTSG5QH

LsDpg9RJR0LtUWVaCdIDN1VNNuFURuFPnhMldrLYX2
RTA7BIDlLuOeBSr0TFI2NKDhPdNuLSm8UIE0QDUgHsBhZCq6LNL0DXGbNxExMCr2DSY2AKMpNdEqGJl9

AAJ9KLAnWePoLMf1JWB0XOYtNxRrMNm0DAJ8QNCaHkOcDMq9PGJ6NCCmFLmkTKn0NBA4FPJaYpRoMEx7

YKY9UEZsMdDtDWa1IKR8RMCbFtTvTDT7PEMmElWmRQ
V6QYR9JuM4XOVlEHR9XMD6JWZ7SOWxYtJsYIllIrQgCdVwWWS2OLO7FLZoQbGaVqB3WXU1JkF1ZCAlBV

V5NLRdSnJqFvLmBhIeJY7CUHM6OiTeHON8NWVpMdHyDrUcSfFbAVB9XHG8CKOhSBH7ETnxTPG3BmKeAh

LjKZdtPyL7AbEhHkNlBVyuRzJ2RoYkNrHgVLUpQRFk
OkAyVKI5JtO8TxqlSmB6RSP0RsWyCidiItd6LJR5RNRdOmhzTyPbEUqkDjW3ElmyVHzaCQk7PWU4Nvug

Dhw4BTx1OQW9XBAzTqx0ZDhpSaU0HtIiBaDsAEfkLcB9CprgIcG5LDJsOGG3WGNyERB0QRC3GfN9NLKh

JCJ1LNW5DzT7GWdgLED7EKJ6CmC6XTBoGVP5INU9Ln
MyWvslMqf6HBT3EVJqBrwgMtMwOO8SSOJ1RYXwTtOvEGUmOTP2EBRjNgHvXSSlSOS4PGlzPsJrTYTmJG

V7ZPHtUfOjPGRoEOM2KYZhKeTkLOP7QrNuHO1KOL9xr3CbMPqqRGFaKX8kbj7KOHT2RS2KHYDxTH2CnK

RhR7EligVWSBHpxgaioC7yQFttZZWoA6TkjrHBRA9w
F4CbhPQxBYWagGFUDxMxVWBiJVDuVG69QDclMN6LYDLDOCgaoKLfOVP8N2Bml3VsayTtHSTdBj0CMBOo

QP0FeMMyjkVxHo3LJTQbIC2Ut531SxExdVIlTUYyRkOyEOZkERKrUNL9AE4IGXNoYK1OsZVpiAKKslzb

WIHoR0U1CR1KFVEJGdDaEz8JKvOlFW7sgh1FNdAuCL
UbQlzLGtQdZDuTHlKfYFIdMGeiAQ3Ez426D2I7AfY4tLGqPVM1RCN9xLFvLhQoMABijrDmLJGiWDhqEl

0wRF7WjsYtMEisCp9WsL1MzeYbUF2wg2XulgeHUbNlVCGfFaixh4FHgYQwMXMpX5xxm4EQnJXbNJW4CF

8SZIHgXH0ZjQR3iDFkBSxhKEOPOPgzCGAxA4XuygXW
ADUwnjyklV7xDNTkYXCsTy5LWUW+To0XHH0qc7OgAVzzFgSyQG5lhh4XOMS9MZ8CeYv4POXyU5PgBCGd

YUQur6BuUN9CXP4rdQjrPQNaJhYvU9ixzlo0bZXjWyO2ZkF+Fr7IDPUvkNHfKP6SHqnZ0XwHzRTW5jtb

ebz4xmNXYEYK2oFrNAANIEVLueajQJ+BBFGCBAwggg
VXS6iHnJDy7TWDHEZCZgAJYKFsPbkzQas6rrt5uWMp2TUYsNOvmmL+21t2o7dlRGkc//M///983Xnvqe

UJ0ap5jMh2zJpPKeXvTfSsNWk0JWKzsi84AkS2sCkBKsIVBmTxzN3I7g4rzmCnpyy1IuJNgR7/9Zp5/j

uOLfsM/b9C7bQ4C8+be+gSMhMTakqD3gHTs5fn+k3f
lvsmpPpQ3H+tuJk1YyT+/sUQOE6BtESgurlwt8RS3toKIR6TtIdWyVKqwrA5zxG/vhpMn3h62Kga6FuG

CUVvJe6tANrtiZgXJ0MjAVKdH6twanE92uJyRuaN7r+gQ5ccHew84f0R8dkdwtJ7f4sYCL3Z7mZsNgAq

UXG0FAMiRmZNWZ320zanJ6mJp//JdkTSZphL2ov53Y
LUsTEISQT1ljZtXHU9fhIucOU5Kfo7PiA+kAREV7hhMhfwoPdB+IVutCANmLPoEbDaZoeXY0eNWVUWxv

C2gqXvAS87WQkCjpTTBMfAfIG4Ba8H6bvCQPsgMk+DFVYBrNkCKbbXWZVN5FgfKDo5JvLPXvhLqAbVxn

C2iCYXi+cOYJxGM9P6vGikMZLCkTJg2C5rtC1Tt6OG
7NNjYJLxLWjK6xKbu0eygTheY1WzV9ddG8O70CGOOAkV4IfQhbw55oZ1jQ48cpZHsTSxZJNyGZ8Yo5WR

va1Ls5YSbOJYNF9/zFFM11DMHxcOVr5qlgVlWXwAyQrZNIyVnVBUB8UdNpqb4kpT1c/aLtCZBEH4Rf7a

ROpMQ/E+2+jj8GBQyjxdwnOswHPpwmG+iJlD/00v0+
lsLW1Sm6qIBnBAHF803dUU1A1EGM7aHOcB6p2etZ/B3du2qbNDDLOor6Bv11g9mHrNzLPHXG12ngrkbZ

B2qTYse6bOpEhbihTz/1AunB3urHxq/QeyoX11S8uroHcoIlRpgtePnokI8GP+USF+D82Da4ovQVzqFs

/U3tQn4C/yJJDjF0lvaWXkai3UuBrogarDZvq7MXfT
o9o52rK8ZRakO9gkrsW1reSrA6d/15ScZrGn0BHJXfN899L27jbg5/3c0floh7kJD3VyfmQ960L6fJnU

YoVx3RqkcPiQ5Te7yjrpJjPJaRIydESf0NV4rrhCUpHamlyBtQ3+Ft+ClyMoK8vuHrpRxgpU3WRaZscn

fEAewvd1+Om6i9LEm4knmZ30/tcHDesPYmUfhVjC0M
M9ST+fT2ciVLYT0FEx7vyjxZZkDPz0sfXq58w//vSEz4Sv0DVxdytFvKU845mMON9gvJEr3XSKaZFo+M

7Nb38Ek0eVpiSZCQsBhaQEEOzvKdiJooIwvUF3fzjSJp9Wxz4qfqBQ4ujCXGUDmdAp0xu5ljv3Bf5Q8D

Y9yllI9w00nsfaq4yDo8SRsTAeD30jcwn9CBnbJxti
9nRzi3idoP+409Fs6LJ7wN6sv3RB5Bp8aP2Ck0+v04/tm6uAyggTp8i8OadtWniqw/nf4OoiOv3K63rS

c4dnu+gRnfk00974ek1G9nwafZbScnj6L4cESpypQKoWpx9YstVtsUWHcGgIWobS1MzaENfPmpFcZAxf

0VQM7ZikKszw2RjTAXAxtEvZ9mCtZgp5L5a/fS99pe
/Ygl5VlrbQxcRxKR69dvbDfj/KqLFzsrepm0U59hwIul1Y2+lYljK7Tg1bdZXd+J0+iSmnIV8Byqk2Ws

uIp5q8aWOL1aWfdrIVCwxMyZXV/AQgMNPt7Ve7oD/QIpol/0RfYRwvo/6PVrevgCa4TLrKDJbMx4Iclx

XZyYwX++XGgFa0V9lC1l/z7srMtkZhOrOl8nAerYra
idSs5JLdTG5tB0I2M/JpbYR+ywykajXTmsOroUQ8M14fEFpknAlIR+RnEO6I1KuflaAvna8MrPUXPa96

QvhX1GQD2ksUWCDbWDh/CELKEHddpxdXAN1sIla2RJbci5QJPVnf0FIsiCKWeUH+nV6xHcAigvQD3FXa

vYj7mV8Vor6TFNzHd5UFkMBN854qpdWJU+cDMdzIl4
rVbXnDzVFbsPO26xm5DJ9yOIs0Mc+j8VmmTJdD+yuZERZpGzBi8F4Q1MCi1CMgDexcqg5CIHT0LbUymd

YbwuF2sXgnO7iP9zkhB5FByf7ZncXUYhdinHzbF90oXn/gfW+gaXKsNU+oDdsG2KKQzlUW8syK/bNcv0

hqdW9MDChvtdE973o4ojwux+cxmBOo6rvYwE61uOvA
teiYegn7gD8zYLR4whWVbK22rUVb72w+ymJkQwinC93MI1vN8sazfyckcugzDLhX5UJAb0X4NC/F82ag

PmnP2HHtR0zdJAhGV8eYNvDFcDaXYwns7pevG8LBLxX1SFpzKvmAgdSU3FLnE5GbxdPAf6F1gAlKM/Fx

iYdzClH3uXDRYLUWXMx3pZofzf0SCOuCD4UMr3kxvY
IV1w6hLXXYNLIhaugXVifvi2R/hF5mli2H9l7wCiqHDjo5wbfWcbYyjQO3eSCEvN2fQj6kFqJK67snsA

YCdQxfeQU1yD3k2nGw9x9zFOVnZFcQZkbGOwxbSWmuVY1dvRqbyVt/Jr+yY+RSM4n5o6om2JZ1DZ9Poz

Oa/Bjfj+CDbXX6jdEN12KwALC8mMg8AbeqLH7j0qdV
/TPr7S0vItrQZBOoNbIFbeRQ/OJgwgU3qlLVs82VDBuemlXPriaXss/PGPkuXO/ZzaEvV3i32klbhGuV

mY7Dl+E4TdE7mrfFUlXsiEpeIdOUugRo4wId61qLSLvbcn7M030mq9LCb0kiKd0w1O/hWz+bv0xNUwYZ

JG+b1BfMU3dDWcI0qJnBC0P8JTUTkeO5LXLcv+EZC/
wchgM38G8yJzTK2VPKft2XUhpkaRb9pT+biiM8B8MU9ptT5lSRK1JpVlkwjDEHzxSqYKrQd7io+GhQI8

OZ1jBNuqAiVi3Yw17yN+rpUpYId1Y1OplosOWGQLLcrWsmgs5vkA7MOr5YVPo6HTt3IL93utiroLQAbC

m+YHoOOqCfH+z4X7YFy1x9E8YJNUntReMpE/32u+Hi
R/qtK+DiR/t1y1INm/QrroGLH+llM+HiR/rQlYVmZ70GTTdofIGsmj9O6mzf2RrbRmPdn0VuACjmilg6

jI6jdPEtnC31ox4+ct7gXVKhvjupV/two9rBXrFfwOzaQOavmbdBhkZVWtsaCZ0yVlGNRAkbuJKhy9Zt

LSitgsjZuXwK2w1ORLD6C8V+BNvtTRK2kBhJdq2DbR
/5PlrT0JjuQSOWA1IyRW89iVkEiNcPylvu3VQ8+USlzU65NqHf4GzGof/QJk9hl7Do3v1KaD/XvllzMH

n5YiRuMXIw+bIH8VC22V69FkN4aSDSm6BRRCOZqPxhGJUyoYS1MxjYq8hwCWMEXHdYWVzYFt2zLBIuD6

MhYaHMZkDLyORmo676yhDGDbKIdHCZOrMte2u0cFiL
KsGZH0sH5ARaB0GgaIpxnZTwekj/vsk/j31NOZHn5W4oLkvzBAatxwlWB6RySwUZ+nO+QkBhl3fDkq2c

+rX7TZWyDKBI90FTfWreiOV8LUBhQjL2msFSlUy82PSJGfF26t8bG68ez6IUOB0Qll12wr3BPyI+wwjZ

bM34Tvww988JJd6PuiYXDbPfnjU3Z3P914w8ulIWHS
cm4A3ngk47H5vl04tRUXCBswn8Ie1wtR7h+tQuHZZXv7MTX1gYKBf8ZxPr7gcfw/MxBGa14XRBck7nKO

D3iekEL89VufS4eyP1uUQslwcLBbdCPztldscA8/G06jJ2NapbnGSvii8Q4wvp65b6af+SIaxo2xcTuV

AVWQtTLcxMXm+Srtyxkp/VNhRC2DIUjhMPyHGPMXdc
XrK0xeIPLx0Fn2PQCzM4XmBDXUVNJOqCPJgOKvKDLAi2GZPSqUGCFl4WTwdBZ8L8OADSseofhZSUaqNt

uAXXbror3b1aQYJGS1YJiof7xyQAVtziIpSvIZvYBc4xg5/xKHWeGac/IAj1F0rADIATwfz2KwbsboAy

DEVRlonMP2v9Zpm3E1y9P9WdfRNJ81VMcZ/ebzE8Yd
Y3hBOrKncFzAOTvaklubnrLA0EikTpfe/bxuZba/TyID135aUZG1ZKzYg+pJunoxnI0vmrkLcMG79ryf

Wb0boHaZLZcb/Rtj7WEMrpwsrkRc2HXNevTvLMcGAcGWbSX4tOnWMn/OhPVQXdXMvoTwq6JToGvxdHZX

niNE+YciMiqGkR8Ow5Bw1eO3a5PieZ3i8n6yB7wZJ5
7gAeMSC5vDj20Utyydh4/jdtYGkaH7sifQpEVNx4HNorMIJiMvX3uZkb5ercZMJNNVadBAwnNyDDU50w

WIyVP7Uy9Z1sGUE7MUzh9Gg7MrOv9fXhIDA8UYxY7cah4PEU2wwVbC+DUaop2rXstvYFq9NFPAzu+vjo

7aedTQqkAEIsZoubXu13OIk6EMpi8KaAbbA2N87EwT
dPBxdRdyZZBkzhhX48MWgzd9QbteyCG8gmmxK7orhCNVgfJEbx6JzHeSQ6BBGcGfiCf5fNGTlwyx5/Cn

wQNG8+V0o8lNg33uagwdAtkFjLkXAOhW8LVpWVDyHAD3i4DS0pTzj6vmMuIodagkxVhI5Y8PcVNz3bwj

U9t0hJrXN8fgjZh8wXf5DO+hzHNMsu5wQWnTUiImXA
HzKTa0k7vBGezIweRK2cXhwvpjTGDUG7vRvwS2JL6Mb+Jdky3owugb1qEuKRsAGRdsHBd0ozZjpnoA2K

HzVkHwygipHeiOhlC9Phm+Gv4igZt8KVecDWijBq4T1FSwLyeaHx6oZZTOkxWM5eHTS2u/6n+o+M/ab/

eGJ3hVDmkL/h6u1jhmKEAmw3BI/MG68zi53Bu93TD1
/GUxV6pB5RGnEEgZw7xQFxOBqKFq/zUzfo0/wNBgtKjF3cECB+1i7x0ipn2OTWQu9MDGbmAvy+8hn33T

ej/M56wDc5129UWnfcUXcHSym5k3SAFHrBe6gK+tbrkUv/B7T6u5ci8RtE91Tiprkw7qI36KrL3tRd74

GX4YiOP1V8UnV0jlspUipyaxmHXxS30rCFf0FtouqY
/kGgOzkt+IuAD4H+gWchpHngCAlUIfnO5d9aHexD3RiJumOAQr/TDyNtn3AuNhVsno6jq6fJ/gltNPvQ

kAhfTkjrMFe1yPqsWnkqlBXWX8K4LMRxUe0X/8S7ZpSdY1rq8KdVvZsLB+Usur2fssr8508uV/UK6yO8

DnmySFAwRDajUcNKIyR2R72PrrdYqFv16moSrBJwiM
TNeguR58HvkWse5edcKPg7M1AmQWy8oqiTQsIJ3xnzU2zIFvIGzQ2F+45Bx9xv02x+nSC/R5MOzJToOD

fK/Y3UrK6vO4BapdEFtQUAECP9JSOkHTGNRGLJ0Hs1OQvY4yiyT6UOeST1cs7RDrFNhRI8VpCSGezsVr

zLMReR8py4JWsrdzUjeFxbORS3GZT4vxw+gzH3V+pi
ptHN6F+1eM4VQX0o/Nj2ciPos/T1uooUU3X9pLgiTbJve+uJ6wb+D3LtZ2QHA7yTnMZHcPZ3NzKiLzrO

0mI3bznvmcc3AoL1Uk8IAMV9GfN3v5/kNJweeaU5/GJGfKwfuTFW1JeMWGnnEAGSmz17LHkYpHwHAZXK

BRdnK1OGVZqqOrJNNWI0oMwC8Rm1De9ghl+gbxgvow
4hm+xi0sglF+1wa9jMbgp0qQTjIVefLRHe3VQYfRixENu620ecMiWT5q7lgU5/H06Z7wF3LHPS/5IGsw

lsVNEcsMVDJHEf0gYQGxNxTI+qDndzNP9Eoz9iY5Q1ozXr7E9i61tMqFVBzSkh6DHNTbClka9P6oFWi9

V5TIavMY7V90OQ5Z48IR6ZvYbH7ah4CzisA3rz+SWN
5l7pMrfIOrMqmrDdYW4HSysoO++9KwB8jK8CeI4hI5Lv7vSQYKBcalcq5nqr5JLuFvrKIhzr72wfkmH+

bfi/EtdTtYl8n7Q8uGCUgdE+d/TV3DtejbS+yuR0UczLSrptsKyjVRXuVtELJEi/AeboQeprBKkXpqaB

mwy8GWFBptyRb7x1kiAO21ijx7DqGxEHtTZZM5ErEh
m6tGPergAFhz43ElL20Zu1RvOwthPse2KKxv3Ga0cbWgdvAGqUDXtRrWm+VIA+mchzA+irXM7DRaKEkV

46s75/3zGGKP6m+3wIU09Z5Z9xS/yNFYxoPaj0a4cgfTAx+7N+FF6JNmL5CIpVpj+SguBrnpnWh0lZC8

OVmxjd85LHTVxKIcaGr6loJZZy1tnXd9rpzVobuqOZ
qmaMlET9phnYOZ+lYst1illy2M1Q2PGf8P0qDr+z9mzqN9I++DobTrKyyYHqqHfEmySRxw5hT9X9304k

jBgY1IA6DgV6HkxR86sEvmZ5AclTjPUu2vNmauqUwpA4+5ZARJwhzWIgh6UBDC2Hjtkmb8X3ScCpG9KJ

A7mvnTHXTocuTnJeEE50g7c3jrf1ID8hy+18glSYu6
14WD04H6MKW9ZtM4/pMSdSd17GhQwDqnNacEwQS595PZz8z8kYvtWSFgNyuMiPdP60BYeimtlisikQLU

NXeHfRLYH4TJKnRK6qZRoLVkdF32ss6XnNBfGtAArp3je18BEUn8FGMmycs3w0/3iQWW7z1mpP6EztLf

VLPtYYLLr1dTPsYLm9eanQHz/eXpujUfVrHg6M8mI4
CVLBFJPr7OdCoTNG2xCn1ouoCvT+g7NiUve6h2Y1PLVhPufkAnc7QAxDDO90FYdcOrq6qHOJgGudzQ9j

wgawKmaDthr971Y6MPXc5Bb+6mBR5k8FNQtpEde1mkcRFjlVfNOFyw5Au+f25Og8KBUo2FN2i5MS/Q8Y

qz/Q/SwOKbg85/81Zm1dd31lCyrauU873OThymySz3
h8A/7VNFHvj/ZA6Kp6XiCz2im7l2I1Cq9noUCH4afj0HS+a+DwQYuFN0KxMTgI2N4M6bY6TQyymGPBqA

f9FahTpTG0YYUgRGDlRLLwAf01umAvTG3I90CiCKBg9+K0qZ061RckB+kcxRLOkkMb4ZrrLAaM25BVOk

KuExutnajk/WaWNcjrZkZOPNg1Bd0U4CL4L8C/BrbY
yosnSxg5WXMi0hA7oumGEEaOWjBP8PxjReIcSfuVGufr1mVoqg6pJ/ifwVwDiG+ybJpUuxNiiSuyGb5i

qtpCsNCKc5sUWobroFXav0J6VczMc9BD6iREdeRE+g6twE8Q+PHBq3eq+Hn05HRs2wcq/TDqZ+rMDf+y

aD1d9E8HpI2ftq5K4SGygftvGyiu9ZAcUXrW5kSETf
B3gb+0Z92nDjH0CuGo7V7vS4J7Ejqz3Z5r80h9xxrXFjsiN+a6c227zBOCAY1H0plpxsUE30IRdKi4X0

+7MObdKqxddkb9rW5CznWWqZbYaeV3csGazQN7/nWGBEVaypQZaPLOkz5OkhC7PvbX02m2gOK4gZFlWZ

hqq9wd86IWCOaXkq14WBhKxz0yJ3KGhFmHy5kf5PmR
TCS6kyk0AaPRq/Pl+t6EIKn6Nv3IN/2fLksQ+fdYG6raeu8HPPasSSuJi8sB6Fwtyf0qhulsu0pIrt6g

lW21DARduOyI9CGcSwAe/BbuqxF+2XNq0TFg22qNtHam3kP5RcdP4ehZhyU03phYZns5qD1DCEe/DmA+

P/ng9VmTM7Y/X0F2JjSJG/7UZPivAXbB/iM0HlPMvS
8JGg/cXLiXgFC2RtzlT2ccV//c6Kpct36XGzMKvDd+3vMCvTFyDfGLqdueDdDItYbb/g9LvU3DKOiSM3

YBI8JcW7UhiZ0jwyHZcRilAHYv7aXaupHpH7Hrr/ngtm0jqpXphl8ZjPOvYQr2R4mtyJya/Uz8hSjgGC

P9QoxPL8I961e9ojCh6QcdPem14Geu/MLzDusvs2RS
v7/T1yiULlzmCgPfosCZw5cH7xtz7Kdisq9IcsUhw+9q0rbYm7JjP4Ewo1p/a+wCm1HJnBZxZTKbB3oW

28FQRuRc/YxD9Up4A0/l55mjw+nbh3ycifUwunQFHpSKbKQgZz9Bk+cLRyxL2ag/1h9pd/siy2Ek3fry

cBW9UX4b0GmlLvlLnIv4efU4gi7HbyhgiZVcXEoDB8
ePOsoLB/VOB5tG8SY5ErngEEQ2dkmp/oXenyjL+aMcri1UdMzdOTOAUX60NeWT32ojV/D/Bv5Y/YDiLX

ZQ1lB/szu3kX8p+hR1ghIyuMu/GTD7pHhqQjgwmdYsGQE1XYbKt8w0+kTrW/1rBHMYQuv54fHIK/DHwB

8OUbySsQq3TjEMyF9cIDe8Dzw8yUf6HedVpw0xKut1
abqXESn1H/XDvPdl/S4v7tNLdZfgDkmJgKiI7+7TwR/Pe0MeP++XWLXO/lyp+BZrsOmYF6R93kP528WJ

4Oti85I/lbw2ZdXh4LE2dKtFtF+X4aujH1UfxIggupCVAQmJfkyfxF0oTfVx/fm8wDpqSip99dwS9cTs

s5LvC6O0wHwsjuqK9c36t/EjMquApWvwL0uxiyrAES
W+UuTOqzwnnmPvj/cyk+w1IY57etUFsJoKlFrcy9LQ77em1yPv/FahZ4h0jK2HZswxr3Qt+7zC+u7c+5

3Wa86+B3dkw69LZ+wT7vXWllSxVgf82F4Cu1Xreys3PKXVq9UzyBXkVs0frnQmpBD8Q7r8Min0v/egmo

edw+Qesj4sbmmBzhCeAuZtIJo/tUO3z+cr6EHhl3Sj
MizlhWn3Sdl0s4BY+W1885CFQwmopy7a16RE9P54XQ7pHKFdPrhovElDBlJq9Bw5ft+CQPBg88WW7jRL

nhK95bbQoiU1Q/Rq+ss0N1kffiEE4OO8Tk6ujm3CBLZ9gH6cNxz4NY8SZBm/O/GvEbK2JAM2SSiz9qE3

7mzQRmbsnu6cEGVG0/Vl9LeF8D+NKN/HZj62ZwAk1g
RQpfOadwZvD1pA6zC21UkVXS3TkRDHpnGAvRhFI88yl1W6Q3T8ii8do6/Uni53qv0/CStX6zCIBQunRD

V6recsVz2vtwLB0xg5X8lAxPvu33oId3JXoIcYxBd1M2vGpqNQgxINLscTx3Ov/YOQiH0l7VKsJ7W+AW

SoTCVSRftIRbVZ7DwMQcYMU6VV9DqmJ5Hj6DFNaC3+
34yiicG6EGM2/T8gx/ddCUWY9MImze6vDcir+BVynK+w37LF4z5+Py8FnDC9uAf6cY+gzu89IUhOC6R/

4Po4l7DRW5Spi/n012AV6m7/nxy+/wKw/wNCTUnfE2gHnHUiKdRwCm5foXQ6OxE47zsHfjr25W359q2R

N6X4GOrgqBmTKjqEYtPzk47+5QVcb0Q0FqQ/sctTZb
Lr1VpnMKM149sHVNuag658f0b+blA5VAX43MePLkIC7M9kzqcQ22tKfg/j2yHvd34rwVQaEo862Rq/y3

qf7fi8e52YEcEOMLL2S66ff+o58PGnau+hFFrMTdixG7xs0W4C1dszosshuBVxoKa6O1nPT7NN/21GGe

Dhpf7XN5IZB0qM6D+lEMCCgcxiMdeaepqNc7TtQJC2
u9XppMbT7sUE1siBqZIhGnC+N3kHH4K2YyosEvhj0NSGte4zxoahgBNURklVS0r089M0/rPfUJ7c853m

udz8XhEtxhsDc3whMGgs/CeAnCzywrCIMV9gFjsZj4aldecytYQ79v4ICfNR9UDv93kANR+37Pqzew+1

QPqn3t0F5l/GTtuZyPEs0RZzwhVj2nO0S9CrnmtxP/
IBAKsTH7YAMWxZgpW3J3NZlwiODcv2CXdP7fqy3R7I6MaI8ht+gnF51pz0tBS2oXyIUuq0jolTab9TJt

RL+TzO3Q+D7qwmAu2pqUf5dxCdR6KYYb7+m3gzLPhSQiAtdw6oeekQ8ZPb43Uo1nhM20/872gquA3u5f

srmH8kXfk8nbf8JIUzE1O4o1gFijSYa71cTQsajS9U
320xRNkASX37v7VEYmaaQAZPme26SoLDtpQ23rbSfizraiCwawMdYzsH7qdBKoSVzl8tmpzVpZO7cSZX

ilFvh3gHrliuaSf71fvsEXQE24hDfquXYIeVg9xK/dnhfypsr+QYQ2ix1m+5W939HZdTmomV0ec/5DdI

f+IkRk8ssiU9ofF+NAuZxZkxi386Ignx4MsyD5nUii
2ont0wx4UxlrLm+dfQf0i8rT66DPm8t+V891l+jv77u5kXa2v05zZVXDyeqd+nARp+T+E3vQwU3/SX7t

21z71VBv2F/QN7T+19Pq2lA9IT3NsqWEEv7+/D1Dk/0361MaTd8saK+sCZGM/g+wXiamkViZA12pn9kd

38Ve9PhHJXVzgxUqiVtLvY077HRsnLqAAD3pNZhnjg
xL6toOq+Gd87YWpYqL6joSfBLMx/dyO1YT7DsQ05UM8vX35WrPWDirCQyiOfMXs53t21nCsT0/YXvHd5

vcxu+U6+/247/hda7i6189+NVOmLDE0I0k4456prz19X/aVP7nWQTL1ltYnB29IKpJ1E2vLyFpY2fwN9

Wo7gklidNF/xcH0zOupDwiQ52h+IZgs7E0W3Otostd
7rPWw9eWoJ/c/T5k6vfJ/Htr0+bLx5ui1XAin8S5sm7dEzy4/Aq8qY8z1O24827tA6TTsVOvXF18JDwE

MG+VbT5gylkrp75ujvTwYN7RvcO9iFADGg8gRkq/W61Ibsme3Legy+3NC0YqmP48ZyEI15E7yHHxY109

OC58m0MfkabhcZzsn/6K6yGNKlJr6e5uCsvGfT1XHs
ZeqS4gLD+MdDl4VGdE1BPiTkmQlbBjo8BPob8IPRY49WI6E1lGXtz+/S5V2pSC9V8CF5TOHI64+k0rvr

+a2cs6hV3Gi25BE3jpDx6lZlV10zsFnDyhWMWFp3Alo9h4d/0FaDDnHwT+UEnVldZghlcLp0Zisn66Jf

gJE6BkPK4RxbFKSJ9ISmSTUZ6PbOX65MP7Gh0H2Isn
MuqtKmEfaqcMB2Vc7amxPFrXOZh+X9uHLZSSvIkJZXJpDswV/4OOHOMgyWMr2dk1reyKP87vY42dncb4

opmvWG03ipgldc7FBtYFINxH1qtYzeZiw++eIjmlXrLhKPWd811v+9ovhQnQoeziPae8X0ReipblabY4

HK9pQxRkrVgmF++vSS0n11X/wuC3tXLDmq9NY9hjcF
Zn9MzI1EP3BPlbdkRenxuh98bXutODngh9sIjyn7441Uyd8qxtQL3GDcwdXM97bifbziTG2xMwT8eyiP

OPe+w8zbI7EBToR6+55qB/0Acl3rlY7MapXmXWfcLeYp8suqtlYnUk9rse7QFmyU7h2z96GSYHegUR8x

nLQX2+tSi/rf2hP2f/KBsPOnVYz/2+axVoNn2ljqHX
3dzWjorsZZ/w60wuO7j6SxsnyD+hARYUf3I5XwMIy3I/PKbnOTdRj+H4S8tP1xEuxvARj/1LbXlmuNML

voQC45cOo0m9W0M5sI+tiVcvIu9G+q67Rb3W5S5x399ERvCaw7d0x8g8xLspyffkXDht1hHY7gul8m+t

1E/Y7gNQVEmqW2qgon+J239epUK0JmO+Q+6iF/snWQ
XDmXyIkJhxQ6BNbHJMqpR6zuINDQ7BJ6oufDEwRiJighsy4iRRG+tazPMP/a+ra6cnDmU/mmrYPkB+Bx

8Q3wBZ/M5Yttkuk7rjgDsnb+uQMz6wD1Lrq6+g6SbT0S2BDHs3AjsR3cxIiRQoT8wiSuQsspBphwgwI4

ki0fokVDg/LT3KTw1qmix3h8sTbBf6WpoQVGXoqvkP
7s7jkcztEe5Vg9JbQfrSk99Rojyw4cM2q4M4vZnzCfuv6YW4DDmcNjMsthONep491W8hzIUt3iOWSp6q

a/9dCUam3wq5zuke7c5A1VIkPV8sJs+X6U1Ee8WEi8M5EVWyH9wAFScnE4K+x8D46jYfIsM32R/ga8Ze

8SyHuDfkmR7OT/HnqI7w/N6AQcU480TWEg3RNo4zKx
Ot5RfoC5iOj/vbEblebvWyNvy9T2mjqV9G21VZkGia8ysHhJB2fchUxdBShS8lrx+XQR/N3hvki/CbZ6

p0WJNZ5/Bz4tfwrSJK1o/b7WUjpuY9DRwn13Nel7KWReLx85/khTjX/YV4VY0EUHOgPP/JmU4m1x7Eit

d5uqjTgEozaz0nltNAsRpqU8HrDnGr1gf0vyl5wiQs
tOq/Z6nYdQBjwcPn8BiA5NnBEr7VkdJk9IRb4Tieq8Os0vjUjDs4kolutsUj8ET70/0116OXnNEuicOe

TVrwCaks/u24BjFtx8lXeTkHvmWGMsrKnfDEIzUklSOSndM+NF6avNeMdirlMqO1XKQMfSlYFS9s2UN0

60UjAdxT5AtzFiv65vBs0j9GzFl0MeICYSoWubvhJV
anff1wYbGbOvdxkyPyc8Axv87WyVyT9CY242PuuymC9cR52D5XSDO7PqSzxh9ocvxaM8vGuONP+ZSNJt

f/WW6Zidemo3U+fcBTNXbjBFkPpRpC4rhcjO3njj82rlRGldK0vxT2cX09bTxYOflTx44gIek+O5Ps1v

WAxwVj48GFVL6JtboDrt868tl2zAfjZGG58pVXW8BM
x6frAQFliQid+3S56u2Z5sR/E1guphh1HavCW49IkUqGyM/0bYldxHioCXKN/cotoqTgvQauiDtuG/1w

UzKDD432PfDMawy+Xu4epC977LGf+/LI9UTdCoEmQQ9dkHuZvB37upA5v4GT8YF+TF9QiR09LVBnivYS

06HXnar5g7L6WD8kRT5YSV1/ZdEEyUoVH0M4GPu6N7
r+uCycvVBl7lYpm1MzujFhX5TlKVn01EZ4Qw9D7SPss99XuJcIs+J9Jo6AgKr7nN1pGeB8V5WDUgItJC

hdrIre4eWvgD4tTysDg+6Kq0zVgZEXCGFUmt084U+cuKz2xb49Y8WNzLUmrvdkYx0iZAdGB5Lwso/aj1

tSgSvRM7MxrTLXF19Qrk/usEUCbDwY4wmMXlnYaX+U
vv/f/S9+H2ogqxQWGhqWvpk76BzpKtbwwxmKSop/cXwt/s5kWVorQKSU6dmeV/01LPExRlvlt/h2WJdw

C86GTqhPwjgZcVDrllBHbig3I8WMolnUBAjxZj+7cf9wBp2Rn8Rr/Ffn9UmkjsatyU36BgJ13EH+ZO/o

2seGa8cDn48Cc9206Bm6i0Y4iyqH34imU25fTrVOsf
UWPKQj/LPWPN+uBFsGSv4c+9tjdKipH9tNN7DWur2rMDOkXJi1Z2oGvM2r+cV76R0n9ISY8qI99i8TqI

iQ2vzVI5o52mmU8/BYMYmtaWLcDucfqmOV6E5ITq2J1+K0glBseou5GzKgdaWqneMjqGaGu8mcxvxdaF

+gPoM/S0jYO6aq14dR2CvBgXWlxe5D1HNPwdW7YFAZ
lOesr7wquzc/z1K24xh2L1+zX1IrpnRSHacRvit/4XU6xNKyfKHmufrEttAAOqnkdoHmoMndHE0FpMAu

G1Rg+ad8Jwot0U4818crNekukyr95AUrh0lSMW3Y/zxGfciBw/Bclv/GW2iO9R3KCTGUfJVYaBL61Nyy

tR6yo60jyldbM92yorQ4U94oqaj2HEGc8toUV/fn37
1Q3X36GxAx00GFsa0qgsA2fK1kWO5lHrrck6mJqfQSvrHwYeTs+aeLmw0XyxOAb8id7jbihK24MjtksO

/FJ/FC82zRaRujekmc9EGNELJWsuQryHfD4fH5qvtvy7O9MHsWq0jrTgwpiyCAVxuw/327FSaxjmtf6g

Bvq5DhEGXmaQ7xEL/KGvlHuEct+p7Y1eGr9LSr1dPl
muPyRK/Xmh/GvKq/HFg2TvejGbwEYAmXhamam1ejNgbtNOmQwVlJoxIocbqE3w+yBA8slUNGPhJQ9bDB

MItjHQwFV6WERz4NJP51UZJ5VOgtMVzUrwdyyGtY9yOKMjKC1tAuVjvHrmBJ6Ptmp2uP/deU/VitpYuq

t5v0PZbDeKjGPOPgPTt1yKfSPrK63aF1ag8gmA65AM
w2mFRimNeXvl/FbbsxcgX38cCvrxl5oYvxTxjmRyKk+2Pz7Rw9NlOlD/jNtOhFNGBtWSDlp4Yvkb3Xmu

/2CHBweOreeedB1my8bXviWAxdqF7UjHf7HHwofiZ0OZ034rVwvBSU7FUoZHcF4lgsRJR6/zayc6U45G

28QdqJonj9Ewc0X8cyUIkw3W8zJDR8Mch7Igfk1Mep
jnCY7s8M/ayoXDMpsHf56O833NH8lLDW7BrIKwAbYIgSGOQmeCzV8D9/MqYp633I/6ltADAl/99cyQKU

2CeKb8RjXC+2g2M7E99o9jDVE3icBe0wbDx6DITonch4bdn0zxXVQ71u+W9fBKrXwoMS8qOt0qabrBy7

PX1JQ9NgImgy4/rl3JmvDrHxTLVQProL35Hk2MEryn
nToecAG60L0cR9AnDX60/U8TcCpqcbH6F7Yto9JSs+MLCgMFYyYW+/EwFh19MMs6ej+Y765d92mCnZGW

hKWzaYVhxxIgtgIfqlBI6Qwhl+DYIW30wMubUg1LLiGzL7svJOT/N0WRH13lkcmsQTdV6ECrH9dZ33ts

/o3I5L7xMmGjTHQv2iGc4LJvhsqs7eZM7SBYOtE0ca
YVp/yfGfIDfMsPe2eiN14DVRDbzNoGCRb4VhzG5B4IsVh5O/Gv3spvtr4ZSBERmeRuN0MrMxAYdluf6Z

9TCE1FO8fTgltgrnnhk5gfnFtbXW2UTopuVGMMvUEFBG3Dx1USH4FglgrRE0Ud9QvOG60TCxzCWaeXe0

Nc8Q4/8KpDpDhVufvpy5cECL2yGebNj/KOIFGlitVV
xBFYxUIxxeooiENlalYZa3MDVjwELFxfVgvkrr37sfYeWWMef4jwcZ7VscUOyhe/AKlI+9PKKJbayFy0

P4TFOoOhUWzMBCVLiGEClA6eIQ1A5jtZHKQGFL6cIJ4DptYpxyvIVVnP+cVekJTKg4ZSybq5+AZQ/NTx

bkrJp1HcUHKLhi++vrZRLafNwEatQ2xpjv5fDIzoS2
pVVp1jwUoXOgu53BLbkajdF+Jifqo/TA84iGEze9xXSerbVhibDkPEdrD6U+zIAW/TlBKVAyRZzZKcns

qv+F/1L18T0e+H8MX/RH5ykMwCRpLrQQW5wtUdjL3WWU2id2RdBVrgKsIvCT5rmg9XOZQ6AN4GnEw8FC

FjZ5MxXEHwAUNtf2RzGX7OIP2jdQmbXaB7Hk4EAbFr
u6MfYTXyOYrUgMPImTpDRFCJ97MbPYfHU3ne1TAZsdXvKv+dcePdWGiUjJXe1jXxb5laoa5J2nMsy2k/

yPZxaU5y51tyB+uPaQmLC1zTGdtbGQZenQ4n27koDAUczKC+i+MY7nEvgAuqs3QcX0rTVhzucP4K5MIS

FFr33qNLD3ZgdK/mgiWTAz5Yhi5cN8KUqgZKCOrY09
Wpd+E+RI/A1ml/QG7WFqQoleotcYY5NWRYbUje4Q0PaWXaRAb2HFFdb9zSo4SQvvXnz5Y4LIkgIHUlmS

2kzdsbM2Dsn4ehE2hxX9lWN1RKFCgBZU8pSXz5MPtYr+uiWw5dU0nCTOgepgGjlXUqES1VOdDnFA0jej

4JQbFfGSUuWjwBLgv3OKkqYR9RbYJsW1EiylMRMENs
eivpvK2eOLpsJL4Pb714JcMnDU4CCKNGYIJxZBFfSFjYHlUxL5FyK7XgjGO5QQXsO2EvYSByZ7r5QEcb

IA9DFRRjRE52IN2rYOMBCgBhA6JmFEvuQZEjVNezRW1Jt836FtIeaNPcTGXsXgPiPAXwBCDgXRU2HO5N

VNZcGXOfgZqbNA0saJIpQB1BxQPbHnGkSYccFK4Yz5
50RmlsZTIgMjIgMCBSDQo+Wz9ZWW9sr3VaAOfkZPMmEO6lij6PTKyQKmHcB4G0qIZkMr8gkW6EuEE8eC

QsM8QXKVBtpqMCqVKvBg2FBTIzUl3avS4WPXPYQCUzTFQrNLdxY3gRXM7IVKVOCLMeNLEncjFrdNzIBg

SmN8FEDTJ3w8IhgVvbRn9aYSmkDzYsqEV7eyjkRXTh
w1CoYQ7FivQkjfnbDyNkGJYrmmKexZqkSY6PjZLqfVVjNA73HCY+FpOIDoWuR6RmxyCMVJWnazazpC3k

VBS1XGFxPk7BOQXeIDqdTNXmZU7KYtBjHgb2JM0sCIE4BVgkULVpDJM8NTAnRBAuAP5vGS4FNo1OWsOu

NW9vri0XZmPtVZEyVmlFNif2AMzyHH7KvOJcW1Olzc
FgK9OyuOxkMO8WoULoIZ1KDNXwJs2vwY9WQLLDESYhFFHeNCibKL6ph3HgtintTXKgfsEypZxaIV5PSX

XeOEIbQ1FaYSHqfUOfuwMlWMjnHOVdHKHlDZcjYK2Qa1MmaKVaPMXfLtNzPIEEBHx+Cf1IMJ1yj6QiSE

daPaYzNI1pcq7RXiQ3JAGpLaZcZRI3RDYgSmbxRrK6
RCA9AhO5FHNmPUq3ISA1OnAtNWJqJeBcMKPeRlKpMPmgJRp7WIR4LMZxTrIbUqv1WTJ9GWA1SINqIGQ2

KSH6MrP2TQZqXMJ0RYN1EpB1ZUSfIZG8FWO1CcF9PLMhEyz1DNQ0CMT3SRUvKUp5EHBuTAu4JTA7NmYk

XFW9ZTT0ZnK9PadzVsHyEHmsBzI3OzwwVrBjBYn1XU
S2CfIkAvx1QNTwUGJ7BteqVVC6WYejLtH3SsOpGjr5HED6IcT8FburRaOtVHG5CeE6QKVvZeSePUX8Fn

K8IXCmQwV8PIL8QsM1GMEgHMdjRzpzNgl6PPG8CFO4AbkyUNX8ABCrOcQ4MSXpOOE8LHKuMYC8HQVeOA

N1JZY7UJV1YDIhDHN1XZFwTiTgQnBsKKCmSXBnHyG4
WhGzOLI1SUH0MvP8HRScDTR4RKJsPjM2ASCuEag8VLJ9FfG9UOKcAjFrSPKkLDLVGeVrBCR4QZCsUbL0

TVM5SZSeSzIbZNu0ZUl9KNY8ETCkHjUmDXh0EUC0ACJmEgBcRYi9FQM9KGEuQuRvFCr8FEP7ZTQhSdXb

OOc2BEL9GGRkVtAaUBa3BIA3YNJwFfYkKQq6KNN5YE
ZnRwFeUVa7IUA9XOWvReUhFL4NEwIkZIl0LXY1XXLgZxQsYUq7VJX6VCQvViCpZTh9XFQ4DWZwHkq1KC

QfKhQ5CGDnWDL8BJR0GvB5DMJsEeSrTMK1RrUpInUiCpL1DYQ3ZHA1XOBjITr9LMTfCkZ5ZrvgPIXsQO

ZgRJC5LMoaIxRrMKKtKjRrPiEnBFb9OhSbJPT3YIYr
IkIxTtGqKkLrDWD3LYF6ADDhERC7IBiwXVW0UrSfHcPzHCN2WsK1UuzeNgT2GVX8MhA8IcbmCuN7GROb

ITWcUcXsQQU7RzM6UjlpVqN1WLT2JbUaIxxlMty5OWO5ZIAbJedxWcUeRJupSfG5RyqkEHu5UnsiQkt7

ZIh6IOL9GtleLNd9UQf0GJY4IdXqQxPwFHmgZvG2Co
RtQmP3OYE2KvC8QNJhCGM5ZBL8ZkR4FVFbGGJ2UOW3CqQ0RRNyVJn8YWNaGSY2SWAaMJZ5MCJ8LtR8VY

BxVnw2TDF5XMCdQlydMir0UCJ0YgCDYkY3ItR0GFIvGEQ3NFW9YlP1AZXxDQT3XXN9PGW0AEVyRES7NJ

U0WiK9DZSzWRD4MCLaAWD1HKXpEMQtRW3dBZzthjBx
LcuJCbK3VCDvz2JaNIf6CQ7IELYsPJhbQR8Hc231OGVvX0OlfNNrhw7VAHIjIz4uqH4nzOLvDSZwHWyv

IPFjaAzkKEzxBR2Av5LylyCfGYD6S2OhdDppzLdroDV6RCFwRPYmB9ZjjNKkSdRwHCoiVI4GfKBfvzI1

Sk4IRSFeIc8xiHEOs8mpCeIjMEFoHbCfVPL1YRjaRD
3ULbAsK2s4XOovO7WpR6pqRJTgU0DaoKIvKW2ZEf8TPqXkSD2yzq6ZUdtmDGReJexLDgs2ZPmkYM1XiH

KrK6ZmfqTlQ0TqbNiaUZ5AixIoFYcnUW9NLIGxVw6woQ5LqtxtlEjTfQQttJYhZQ5as5WkgjzuU0gdMR

0enLVzP24fjA8fBDhoTZ3UjNDhuTKxFIShMzKjXVBs
wDPzMYWxWhU8EOpbFE9NrZK1lHHtVrvqEVQDDCllNR2Ee113OKXwJ4OqdPLzhiExYLNtAVTIGc1+DQpl

sqXuUtnWNwTgLKZhr5IsEExsYI8QBT0kq1HjHFufXZQdn2GyJDw5KR1NUBFtOWXbQ9YziSPdC3KDRn6Q

CIv6Z4tlUIivVd2MfDFaDFWgXOrnPL2Ph306JNq3JO
3JYHVwXTOaBBEQQmJeELXkVvOyDsQaTZNUACazRIIkE9JmLVA0AHHiMg2YIVTlKL9IAzOeFTOhGEY+Pg

1XARNjUA4fhzSnzEB2DOC+Dg4OHBWiGXg6K8J2RPAfPAi7U8QUK0NXBMAfXKufGLylLKKtQMh8C3R7IE

ZaJ8ZRJ2Pvdzwhqw7+YD6AW14KHYUyNZr1N1G7rVCk
L4H5tLcKzRC0SL3UHZ2WfWg1yPLjaG8+VM8DP6QLMqTtKQe2A5K8jKJpZ5I5nDgSjGZ5AT3SSW4IoPOs

IUQvlfLaTc0xR5MXZVxVEvBVKOY5NH4OiQRaSK2HcDVGH7OqdRIlGw8qXZkmhUHcxC7pRu8cZZdcIY8F

BkYANOlAFXQ6AR1GjSHtGF2UmTLZY2OqwXIpKn2eZD
lnaHRlbj4+HA2RXWLiVv9FVo1+RScyheBaKloSQlZiTFJsm1JoUAo0IE4KRT6dxZapDLW5Sh3JrCP2tX

RtJ4xKJC2YtJJnN42fwNTdASDwWk0PLmX5shOeoM4OMV00gJFqc2N9WKEbX3xbKQuwg91jPEorXExPGF

7yTEKNAPjfEPpyDGI1UuAmuxnkKICtEe5THkCrNGl2
qL5unTD4MMO6IyuikTLaDXgrDyJfYoGrRnZ9fHnvqtx9VLueXJ4cDThxwmyrMZJiJby+DQogICAgPHJk

TtvWPUScxW3dqnD9nuScCMlojAQmIq6nz0s0WewyVw7uYa1nYWi4MpTfCnBpKPHrWv2xuP46BMaktzLz

Hl1BIiVdURD0H3HbTggAOYR+ZImjPXbxgKu1wETaLI
UiIz3YBWVgFRVqIWMnLHRgMDSpGUHePOBlZCQaXNUbZGUuANCsNOOlHUWwZHMdGVHuVWHrHIDsNVVnTL

PfWIEaELZxWDFnXNHqTSOwVYLdVTGlSQXyJURdYCYyADMsDOZiYHOtJLLtKZ0MCCRqBVNgRPXuHWTiZK

AgICAgICAgICAgICAgICAgICAgICAgICAgICAgICAg
SCLhARGjBIYmEARmPKSgZYBeNCNsOLEtNBUeKRYiRLQsIVNfFHXvJFPqLDMuTBOsXQFoREYrSH2ZVRDq

ICAgICAgICAgICAgICAgICAgICAgICAgICAgICAgICAgICAgICAgICAgICAgICAgICAgICAgICAgICAg

ICAgICAgICAgICAgICAgICAgICAgICAgICAgICAgIC
FwTZNjTL3ZQSLlMRGnMPTeTNGwAZZkHWCpIKQpTRBtAQMgDLCyWPNmOHGkEDDxUOSlOQTxJIYvHIVgKH

YpGFSvVABoLYVoCYEuRFSbAAKlHVMdEIJbOTYmYCIvCADtQZYxMGAkUJBtZTPpVX5XLORnUYYlVJYnSC

AgICAgICAgICAgICAgICAgICAgICAgICAgICAgICAg
GUDwPDVeRWUxIASjKZQjKQJnUZCqMMQfZGVoXLKiTDOaEGKfFGYkJYFdDIGdZRDkWCNjAGNoCVAgDG0H

ICAgICAgICAgICAgICAgICAgICAgICAgICAgICAgICAgICAgICAgICAgICAgICAgICAgICAgICAgICAg

ICAgICAgICAgICAgICAgICAgICAgICAgICAgICAgIC
MxGKQgFNJiDI4DWXOrDOMiFEKiSQEcJQXqMLDiUTQzTFWhRLTmAJKoVCMqZMLlHJAdQDOqGRCvOZDjAP

FuUPBtVZNcWOZrPBAfHPHmNZWnLEXtAEBeCJDfBVZvJCMfTQOxGKSaYGBxRLDiLULlNJ9LZIFnUZNrZV

AgICAgICAgICAgICAgICAgICAgICAgICAgICAgICAg
ICAgICAgICAgICAgICAgICAgICAgICAgICAgICAgICAgICAgICAgICAgICAgICAgICAgICAgICAgICAg

IW0YMBMaRGZyXANdLGAqCNYqYWNoUYQsPELqLYRbMCUyGLBrPTBeXBCwVGWoIVWxLEEyHWQmMXQaFVRl

ICAgICAgICAgICAgICAgICAgICAgICAgICAgICAgIC
ZjDGXqCUYvNRQeQE6MNPGwHVEnHWMcJDFaOXUeTPNtCTCgIGFnHEXqRDUwKSIcDBEeKMDwNIYbPDPwGB

XnKQPyKWXkTLDkYROaHEDoCEGzOEKlQMFzXYRiAQKdBZBdBUZgMGZrHQEyLQVxNKGbGESnZC7ROQ53uR

Aaf4F5QCZjID8njri/Hw8AOHvkihXwpWPuDD5JWrZf
WZ9wls4FZxMaHE7gin4CKQnZVaBbK1S6mCIoIVDvCWDPSfXtE93oZPofCh58PDwzEVBzXlXmGHi3Uu5D

SmEqR2wwLRWbGeP4EYSbYqH1DSUiCnD7BCZtYtHjYJFrAWRxOYEtDELBIXE8RPMpEyUtBVjfFY7Qq8Wy

wSQ5HIt+Kc4IZS5wh8MfXVyaXxFvLR7muc1THPiYSa
XrZ2HnksL4EAMeZZUiCz9OERBmYZVktQRbRdYiFPNFYkScQ5NhjH52JZBYKw4+DQplbmRvYmoNCjMzID

Khn1LiSRr1HA1PFFWxFVz8kRZzRLBoW9Snc8AiMj42VSIcAwemK5His9NftnWHEB9gTpSawFysLBEpTW

KaIg0eRP0aPGDfBEQbYfOwTFBLME9WKAClIATzfIBu
MLJiTHKAGE7ROHxrOHJ6IXYqnhEanJKiBOqkMB8TDKCwpcNiMeVjODICLVh+Fn3LVO8ll1UiWUedIKCt

PF5bcn4SXNaCAxBaY3S7qYHlO1D5USiyYl0BLFKsSLPuNsFtBNVQURgxOC6NHS4zrsJ7TS4RnBBoGXEs

DEWwcULmMSc3U48mnHHdOCnmIU2YRFY+Rahel+Pg0KIC
SuNFRcQCIoCyWvYYBGHcFjS5HnV6ESf0QxU0KmLS28xXfvhqMuIKfcLB9MIT0kFHPpZGGKEE1NwDBfaT

1zhaNfAsFoKPKGFcLxV63tuGPjGSQsNHPyKLVoTq6PKTWkI8XehuLbiXjnifMbDTTzUYZGAR9KRWrkep

EvtLIqnEsrIO42yPlrUD8PBm9GLfNqTD9sgo1SlCPa
Ug2VPZJkSV9LEKTeAUNzGOBuQYK5OFMhBySwJLnxVICmWXBqUFY6PTJaJDUrDS0WYhWnZXXhQqK6CETo

PWFpWQDdbc4DEMNeSFPeUfE4TJRuNNEkBXViPJkdTRNcHSEfHYP2QKRfHDWgET9ICoBlXXEnRFZ9LpLs

UHQpSECrvm0QDNTxWTNyODFeXCYjTOLnRFTmOQsaAB
TqQGI4LsF5BAVtKGAbZC8GUxGdNKRnGKa4SsbrUWGlEIFlup4LMOVwSAOwBFn6OFMmJUFqOVGzQRpnHS

LkZRZiHZJpNWFrCTWbIU9BAfCwADOfHAS1LkJnTKXrCRZvjy3EIFOlBGXcZsW7YdKsRBRkHPOsZHqxFS

YjEOY1YBP0LAYgBVNgXW7HIjHpDIKgSRZ2BRTeLCZl
UGRtgv3DDNLqMKTvXIV3HhBjQSQkBDDrDGdlPSWkQGX7RCYsDZFtFWJbLH3ZKbOvLUIsQfG0RkSkLBDx

OOZhzj0NZDPsHYPuLBNoQPFaQIRxETYoELnhAGZvRJZvWYIwWQJuAVSzSN7GLxTiASOsTpK3SvLyTDPh

ABXtpb2TZPYqCQJcZWf6RmEoHOOlOPUnWHynCRDzBS
RgFPDuGQEaBPBlZM4JSiHaUAGjLbR5OJSvYWOuJRJaav7EAPKjBIVaWcmaGaXsDHGuJMAaUJbpEDZfET

A1TCH2TQWeOVFzDM2JKqRnUPCcSzNeXbKiGPQsZWZuwr0UOASxWNGbGLYoRLErJTXwATKsJVqlUPPnID

U2DMD8EBTkODSoSX9DVmAjUAAuDrI4NULvZJWcVQMz
mc1WTMTiJEOxVEvpQLImRQXqIYAtHXgcYAKcVVB7XkR8DGQbTXBcFG3OQcRdUYDoCiw2QEufEOMrQOAb

iv7AFRNlZOXmViz0HIPjMRXlDTXrBOunOUNeOHG9FSBpDKFuZZYvHD1PKfKoZNkoGTNVNnx7JIiiM3s8

LXFtDY6FZ9Zoe9QjPaQcONUYKImiTZ7ruaVpHLYjPd
0JY4fYWureKXNpAvttLOTgWvQ5OIqqWIfrO2FbJ8MhGri2JAQdJL1lFUV6UoX6TKWeFpUpATjzUeE9GN

F0FQKfOuOuUYc0VbU6MvGjEN1IJo0FIoB2YLQ4rOYnAu3SSjljQhOJUyWlWY1QCAh=









                    ID                  Date                Data Source

 

                    [id number]            2021 10:48:07 AM [facility]

 

                                        US BREAST INCLUDING AXILLA LIMITED RIGHT

 [id number] RESULTInterpreted by:Tolu Sykes, MDPROCEDURE: Targeted right breast ultrasoundHISTORY: Palpable, pea-
sized, mobile, painless mass in the right breast. Patient has a history of right
 breast cancer and is status post bilateral mastectomy in 2016.COMPARISON: 
NoneTECHNIQUE: Ultrasound of the right breast targeted the area of palpable 
concern identified by the patient.FINDINGS: There are no prior studies for 
comparison. The area of concern is identified by the patient as being in the 
superior midline. At the 12:00 radian, 4 cm from the nipple, there is an 
elongated hypoechoic structure with calcification, measuring 2.1 x 0.4 x 1.1 cm.
 The ultrasonographer notes that the area of concern is freely mobile. The 
appearance is consistent with fat necrosis.IMPRESSION:  1.  Benign targeted 
right breast ultrasound revealing probable fat necrosis. 2.  No evidence for 
malignancy. 3.  Recommend routine followup examination in one year. BI-RADS 2 - 
BENIGN FINDINGSThis document has been electronically signed by  Tolu Sykes MD on 2021 10:46 AM 









          Name      Value     Range     Interpretation Code Description Data Nikole

rce(s) Supporting 

Document(s)

 

                                                                       









                    ID                  Date                Data Source

 

                    [id number]            2021 03:37:00 PM EST [facility]









          Name      Value     Range     Interpretation Code Description Data Nikole

rce(s) Supporting 

Document(s)

 

           BHD COVID-19 RT-PCR NASAL SWAB Not Detected Not Detected             

          [employer]                              

 

                                        This test has received Emergency Use Aut

horization (EUA). We willcontinue to 

follow federal and state requirements for COVID-19reporting. This test was 
developed and its performance characteristicsdetermined by [employer]. It has not been cleared orapproved by the U.S. Food and Drug 
Administration but has been givenemergency use authorization. Results should be 
used in conjunctionwith clinical findings and should not form the sole basis for
 adiagnosis or treatment decision. Methods: SARS-CoV-2 Multiplex RT-PCRAssayA 
not detected (negative) test result for this test means that SARS-CoV-2 RNA was 
not present in the specimen above the limit ofdetection. Laboratory test results
 should always be considered in thecontext of clinical observations and 
epidemiological data in making afinal diagnosis and patient management 
decisions. Results will bereported to government agencies as required. 









                    ID                  Date                Data Source

 

                    [id number]             2021 12:13:00 AM EST [facility]









          Name      Value     Range     Interpretation Code Description Data Nikole

rce(s) Supporting 

Document(s)

 

          SARS-CoV-2 (COVID 19) NEGATIVE - SARS-CoV-2 (COVID19)                 

              [facility]     

 

                                        This lab was ordered by [facility] a

nd reported by [facility].

 









                    ID                  Date                Data Source

 

                    [id number]             2021 12:13:00 AM EST [facility]









          Name      Value     Range     Interpretation Code Description Data Nikole

rce(s) Supporting 

Document(s)

 

                                        SARS coronavirus 2 RNA [Presence] in Res

piratory specimen by ROBERTO with probe 

detection  NEGATIVE                                    [facility]      

 

                                        This lab was ordered by [facility] a

nd reported by [facility].

 









                    ID                  Date                Data Source

 

                    [id number]            2021 06:30:00 PM EST [facility]









          Name      Value     Range     Interpretation Code Description Data Nikole

rce(s) Supporting 

Document(s)

 

                                        SARS coronavirus 2 RNA [Presence] in Res

piratory specimen by ROBERTO with probe 

detection  NEGATIVE                                    [facility]      

 

                                        This lab was ordered by Georges Ansari and reported by [employer]. 









                    ID                  Date                Data Source

 

                    [id number]       2021 12:00:00 AM EST [facility]









          Name      Value     Range     Interpretation Code Description Data Nikole

rce(s) Supporting 

Document(s)

 

          Carest[facility] Rapid COVID Antigen Test Negative                           

     [facility]     

 

                                        This lab was reported by Georges blancas. 









                    ID                  Date                Data Source

 

                    [id number]             2021 12:41:00 PM EST [facility] ([facility], )









          Name      Value     Range     Interpretation Code Description Data Nikole

rce(s) Supporting 

Document(s)

 

           Thiamine [Mass/volume] in Blood 106.5 nmol/L 66.5-200.0              

         [facility] ([facility], )                   

 

                                        Specimen Comment: Test(s) 070141-Vitamin

 E(Alpha

Tocopherol); 047180-

Specimen Comment: Vitamin E(Gamma Tocopherol);

                                        004656-Vitamin B6; 978649-

Specimen Comment: Vit. B1, Whole Blood; 001586-Copper,

Serum; 230823-

Specimen Comment: Mercury, Blood

Specimen Comment: was developed and its performance

characteristics

Specimen Comment: determined by [facility]. It has not been

cleared or approved

Specimen Comment: by the Food and Drug Administration.

 

 

           Pyridoxine [Mass/volume] in Serum or Plasma 3.8 ug/L   2.0-32.8      

                   [facility] ([facility], )                   

 

                                        Specimen Comment: Test(s) 070141-Vitamin

 E(Alpha

Tocopherol); 550091-

Specimen Comment: Vitamin E(Gamma Tocopherol);

                                        004656-Vitamin B6; 033342-

Specimen Comment: Vit. B1, Whole Blood; 001586-Copper,

Serum; 503749-

Specimen Comment: Mercury, Blood

Specimen Comment: was developed and its performance

characteristics

Specimen Comment: determined by [facility]. It has not been

cleared or approved

Specimen Comment: by the Food and Drug Administration.

 

 

           Ceruloplasmin [Mass/volume] in Serum or Plasma 22.2 mg/dL 19.0-39.0  

                      [facility] 

([facility], )            

 

                                        Specimen Comment: Test(s) 070141-Vitamin

 E(Alpha

Tocopherol); 179898-

Specimen Comment: Vitamin E(Gamma Tocopherol);

                                        004656-Vitamin B6; 669335-

Specimen Comment: Vit. B1, Whole Blood; 001586-Copper,

Serum; 511745-

Specimen Comment: Mercury, Blood

Specimen Comment: was developed and its performance

characteristics

Specimen Comment: [facility] It has not been

cleared or approved

Specimen Comment: by the Food and Drug Administration.

 

 

           Copper [Mass/volume] in Serum or Plasma 107 ug/dL              

               [facility] ([facility], )                   

 

                                        Detection Limit = 5

                                        .

**Please note reference interval change**

 

 

           Lead [Mass/volume] in Blood 1 ug/dL    0-4                           

   [facility] ([facility], )

                                         

 

                                        <content>Analysis by inductively coupled

 

plasma/mass</content><br/><content>spectrometry 
(ICP/MS)</content><br/><content>Environmental 
Exposure:</content><br/><content>WHO Recommendation    <20</content>
<br/><content>Occupational Exposure:</content><br/><content>OSHA Lead Std       
   40</content><br/><content>SHERRY                    
30</content><br/><content>.</content><br/><content>Detection Limit =  
1</content><br/><content></content> 

 

           Mercury [Mass/volume] in Serum or Plasma 1.1 ug/L   0.0-14.9         

                [facility] ([facility], )                   

 

                                        <content>Environmental Exposure:  <15.0<

/content><br/><content>Occupational 

Exposure:</content><br/><content>SHERRY - Inorganic Mercury: 
15.0</content><br/><content>.</content><br/><content>Detection Limit =  
1.0</content><br/><content>Performed at:   - [facility]content><br/><content>[address] [place], [place]  
[id number]</content><br/><content>: Tatiana Lopez MD, Phone:  
[phone]</content><br/><content>Performed at:   - [facility] 
[place]</content><br/><content>[address] [id number]</content><br/><content>: Araceli B Reyes MD, Phone:  
[phone]</content><br/><content></content> 









                    ID                  Date                Data Source

 

                    [id number]             2021 12:41:00 PM EST [facility] ([facility], )









          Name      Value     Range     Interpretation Code Description Data Nikole

rce(s) Supporting 

Document(s)

 

           Vitamin E(Alpha Tocopherol) 12.8 mg/L  9.0-29.0                      

   [facility] ([facility], )                           

 

           Vitamin E(Gamma Tocopherol) 3.2 mg/L   0.5-4.9                       

   [facility] ([facility] 

[facility], )                           

 

                                        Reference intervals for alpha and gamma-

tocopherol

determined from National Health and Nutrition Examination

Survey, [id number]. Individuals with alpha-tocopherol levels

less than 5.0 mg/L are considered vitamin E deficient.

 









                    ID                  Date                Data Source

 

                    [id number]             2021 12:41:00 PM EST [facility] ([facility], )









          Name      Value     Range     Interpretation Code Description Data Nikloe

rce(s) Supporting 

Document(s)

 

          Vitamin B12 Level 390 pg/mL                               [facility] ([facility], )  

 

                                        VITAMIN B12 NORMAL RANGE



NORMAL                     247 - 911 PG/ML

INDETERMINATE              211 - 246 PG/ML

DEFICIENT              LESS THAN 211 PG/ML

 

 

          Folate    7.1 ng/mL                               [facility] ([facility], )  

 

                                        FOLATE NORMAL RANGE



NORMAL             GREATER THAN 5.4 NG/ML

INDETERMINATE               3.4-5.4 NG/ML

DEFICIENT             LESS THAN 3.4 NG/ML

 









                    ID                  Date                Data Source

 

                    [id number]             2021 12:41:00 PM EST [facility] ([facility], )









          Name      Value     Range     Interpretation Code Description Data Nikole

rce(s) Supporting 

Document(s)

 

           Thyrotropin [Units/volume] in Serum or Plasma 0.606 uIU/ML 0.358-3.74

0                       

[facility] ([facility], )     









                    ID                  Date                Data Source

 

                    [id number]             2021 12:41:00 PM EST [facility] ([facility], )









          Name      Value     Range     Interpretation Code Description Data Nikole

rce(s) Supporting 

Document(s)

 

          Blood Urea Nitrogen 22 mg/dL  7-18                          [facility] ([facility], )  

 

          Glucose, Fasting 125 mg/dL                         [facility] ([facility], )  

 

           Glomerular Filtration Rate Laboratory test result                    

              [facility] ([facility])                           

 

                                        <content>Units are mL/min/1.73 

m2</content><br/><content></content><br/><content>Chronic Kidney Disease Staging
 per NKF:</content><br/><content></content><br/><content>Stage I & II   GFR >=60
       Normal to Mildly Decreased</content><br/><content>Stage III      GFR 30-
59      Moderately Decreased</content><br/><content>Stage IV       GFR 15-29    
  Severely Decreased</content><br/><content>Stage V        GFR <15        Very 
Little GFR Left</content><br/><content>ESRD           GFR <15 on 
RRT</content><br/><content></content> 

 

           Creatinine For GFR 0.68 mg/dL 0.55-1.30                        [facility]

 ([facility])

                                         

 

          Chloride Level 92 meq/L                          [facility] ([facility])  

 

          Potassium Serum 3.9 meq/L 3.5-5.1                       [facility] ([facility])  

 

          Sodium Level 133 meq/L 136-145                       [facility] ([facility])  

 

          Anion Gap 12 meq/L  8-16                          [facility] ([facility])  

 

          Calcium Level 9.1 mg/dL 8.8-10.2                      [facility] ([facility])  

 

          Carbon Dioxide Level 29 meq/L  21-32                         [facility] ([facility])  

 

          Ast/Sgot  54 U/L    7-37                          [facility] ([facility])  

 

          Alt/SGPT  54 U/L    12-78                         [facility] ([facility])  

 

          Bilirubin,Total 0.7 mg/dL 0.2-1.0                       [facility] ([facility])  

 

          Alkaline Phosphatase 67 U/L                            [facility] ([facility])  

 

          Total Protein 7.0 GM/DL 6.4-8.2                       [facility] ([facility])  

 

          Albumin   4.0 GM/DL 3.2-5.2                       [facility] ([facility])  

 

          Albumin/Globulin Ratio 1.3       1.2-2.2                       [facility] 

([facility])  









                    ID                  Date                Data Source

 

                    [id number]             2021 12:41:00 PM EST [facility] ([facility])









          Name      Value     Range     Interpretation Code Description Data Nikole

rce(s) Supporting 

Document(s)

 

          White Blood Count 10.7 10   4.0-10.0                      [facility] ([facility])  

 

          Red Blood Count 4.21 10   4.00-5.40                     [facility] ([facility])  

 

          Hemoglobin 12.7 g/dL 12.0-15.5                     [facility] ([facility])  

 

          Hematocrit 37.7 %    36.0-47.0                     [facility] ([facility])  

 

           Mean Corpuscular Volume 89.5 fl    80.0-96.0                        M

[facility] ([facility])                                      

 

           Mean Corpuscular HGB Conc 33.7 g/dL  32.0-36.5                       

 [facility] ([facility])                           

 

           Mean Corpuscular Hemoglobin 30.2 pg    27.0-33.0                     

   [facility] ([facility])                           

 

           Platelet Count, Automated 301 10     150-450                         

 [facility] ([facility])                                      

 

          Neutrophils % 87.4 %    36.0-66.0                     [facility] ([facility])  

 

           Red Cell Distribution Width 13.9 %     11.5-14.5                     

   [facility] ([facility])                           

 

          Lymph %   6.8 %     24.0-44.0                     [facility] ([facility])  

 

          Mono %    5.0 %     2.0-8.0                       [facility] ([facility])  

 

          Eos %     0.0 %     0.0-3.0                       [facility]  

 

          Immature Granulocyte % 0.6 %     0-3.0                         [facility] 

([facility])  

 

          Nucleated Red Blood Cell % 0.0 %     0-0                           [facility]

[facility] ([facility]  

 

          Baso %    0.2 %     0.0-1.0                       [facility] ([facility])  

 

          Lymph #   0.7 10    1.5-5.0                       [facility] ([facility])  

 

          Neutrophils # 9.4 10    1.5-8.5                       [facility] ([facility])  

 

          Eos #     0.0 10    0.0-0.5                       [facility] ([facility]  

 

          Mono #    0.5 10    0.0-0.8                       [facility] ([facility])  

 

          Baso #    0.0 10    0.0-0.2                       [facility] ([facility])  









                    ID                  Date                Data Source

 

                    [id number]           2021 02:37:22 PM [facility]









          Name      Value     Range     Interpretation Code Description Data Nikole

rce(s) Supporting 

Document(s)

 

          Progress Note                                         [facility] 

RGCUFv4yKoJZVlDp53/PTNohZMUhz0RkYQmcHFi0FBhfRSKfR6HjSCW8sD1iPRM6EIbMSoSdOsHhSTLo

[facility]
ClIqlRQvUgTYSmHjyPRkZhZTtrWiihbGUpVC9YuEN3MJNgC69qXLUuBYAsN8EoFAD9MWE+Cc0LOQPtpV

IgPM9KVneG3O8ti9aGRt3ewX6shEYv6zYK0DH7DGUqDlrlDQk1bUSD9gPZzVA1ujxuf+Tk3F/Tn1q+ip

rx+rPDAKiZcexW9dOamV5dwD6wDNPd5nqFM14YrSQ5
3/zPMFbi/Fb88Q+byg1Q16esU7qM0XKtNB7JXSH+7paUD5phx48YR8GJPC5rjXabwxZN39ARfO4J7gHc

cj4eDV+Et6B6cXS7QQ+5gm0nPzzEGavlCs/8IPq/iv2+dY0ip4UTN13yxwygRsBIxJIFXcjheAOFOp6n

Ww55BbjiqpmkZHYyg83I+tsoXv9u6EFax5Ly01w5PW
JxaRd+TscJXEAgNOLUc7UPrJz2Ztc28EzIT4KZTT+zFWOitieN2kozUwz3Da0O8i9UjnB/nrBssYoL39

g1K5bACWzO76MAKOJ7JNjPfHqniiI7FmUmam17Fcnw15ef/J4TtWH9WcVZucp2zoI4w6mdsefycLt70e

S3RszVXCJElxH0MbAP0jhb9I3PXX98Z7oG4u2Qh5sg
rahqYhyGZ51HmD3ctV+c9j/s7/wfukb29vvteJwt3UlNXEF48XTDcVAWHlK2ruVROxW30m7lmsIpDDPC

6Jg+Z9zq1a054R2ABq067NvbqdnGCe3ChKrJvANq+qI0eFxQAlKE4+ORRetXAi+lxZX+nme2215s+Gm4

czi+NSmPLILlYxPQvgQa2uJmQKeztwqkGFUN59qVMS
KZTCbJxY+oFXcwqQ2nwJVnCub9JvB4KfxV8YQnZjQzEmidvRBDrmjyjWPRF14dUWtDs4vjHxOw1NjERr

YLMwMqucRO6s1sbkOAsgwrWUBeLGur5c+pJ3IsJNp+iWWWSJb8ylovfZD7qf1RasYGWqHXxipXd2xTme

V9jLZQ4fAT5rrAaLdhRTg+usuZMd2UqtXF9onJwB4q
W9HxRufF6MLDLYFmQYDtYyUDe7owYhBWoF8ZCe01p9LN30eQXD9dSzKHcKmy24iiGiG5yN/wynNNRR4H

qnH8Hh4H9fmhTDzhiYWbgCHnJ3VqtWy5BukndGZfQo1DWapoUVnDcjA3CH6GMcGDudurq/dnOgdKG/Q1

tW6CdDj5SZ5q2cWypdhXqyKVimyIFZ+emB/7fMOd7I
GIF+j4DunV3YpEWSNw6GDpMpiQL/oILGOgJY+Hb17NMtIVcdAEf5vpkYj9+9h5vYOrqahQCqIR/93Vrd

kCFOt3sJZkSUbvdT3CD8pGGd5oFWN0htR/DXDLKDOEAwJS19+/Os6Z+tu/RP9td/zUyQq2mis+w11iU0

scB5Q0djMuj+hpePxWuz5upCuSMxCEFPlrFHg5rzHe
xqmcJKHUtYY5TCJTI6x4IGK8JtaYlmF85oXtJCHXqnX4/ky8nD6DyUIOpTpNZSAAt0rAsjowDTbg2gF2

jF6s9cJfeiY/Kb1lywTrlHXWK8in41NyMcnBdiNDm//u7J6MM/Xz+7RFWMQevaYTeqCzZxyEzopeHDRn

66YaCkOO3TdgaB3b7jgh9xEhYdU4QA/2FPxx8WmHAT
00yEv/Uky8U04wt0lcJbJfWyv/vUCFXmGMwtQA+g2o4epy/cpJ9Tb8/QnoyrBoJc3a96S22/AzLm8hlL

mCB3eU4QX4Fk/r7JaBrOoBwvmU/3CrEljAjgGYco0MytxUWyLG0heVQXGLaGtzkLdzhstRHOeImOGWAU

C82JTkA6k9UIxL4ATQxu9tb/hSXPRjHWkUznXbKFbV
9D7OMgcG72ODnEX6VsSl7WFyVBkDbegjkRKy/vM1s5sLb9DLuiSFRofRTPCAVt8mT8afHpH3rG6LBFip

9XtAU0YPuUlYYJPPW2/tovrm6DV0XQdVGghJEXfGHEmzR41a0dNvPhkPVFW7MSHTsf7vuhWAFuByaOqr

6JPRq5yT+s0h1/oXInWdSuA8OqHRhk445o1n4QBOD8
dW9iQOI4oBpkPer6r7y20a03Ofp7X9e+8n9wDWE90kx5pZCRkPKaZFNzEK3wNvEdlpy0OxUt2kvRq1Dw

Gy+zmfII4ZfyHujpL/Xx9BH7G+lyuzLfZTHq9OsmATSeeXDxEqF4nOXXwAx+eINMYkbAAZmhdgKfRbOn

k4mXROgz8wD8aeYz+GGlHLmtmS8wqypjK3NkTXvB0R
XyYpOV5dVwpaHLHrNDWKHD7B02Q8HlZioI6AzKJqvP1PdVhAF3VCVecea3TLhDhmfsdc9Oe3NiSS8ev8

c66EzI8wpyArUYKTp+vSVJOCdI1OgMgBQHoWXOh2HTQHiDtFNLTGn0VtdcjVq7dxmk2bb1bS/LXgUqTV

Bav8EKWvjk7yXA0czVznr686MPcXmeHMkNOPFJvMNd
+o5SY0o0zielQP4bNN+rh55yUXmuL9y7kgNqB4P10o/ft58JNBRJ0PeFZTZtcF4A7Xt6+D9tJocdTZJa

3dhvjEuiTKaHEXzAukfwoL9iVm3nS8YsEm7NB4C+JLbNUAjetvT/XHKALyZT7TrZ6ITzFtyPGbkqYgrk

JjYimofNBREl7bow9JeBXnvkpDBQXGpBALnf6Hzvvy
7cyZOXl6qqwgZn8Y2UjrUH1G1FCX2jyxR8+rFVw18dyHupmSh3YdxQ1kv1Ufz6UMYBybmHZ0AGQXvYce

iH2Ak7eykoVUYm6ZG0Ts7oGheVlEHtA+gsyxcVYPbQ/0Z/xgtrD1N+ziY1gBmFCLYPMTNPgqaxIdoaZ2

WVapK/Tbv4ZoTieMaWthh0QY03w/7XEpNiKhbcvGg8
3PSMkFlYre1LJBQc2GCe+9VcrKsAwDQTV5M3ovxtjbQ5N0YldUQtuhiv4woiXZnuE5Cs79VSt0sqdWl/

o3U0GFnqCzYHsf8a2kKlxX7M3yfUkrODc0wj7P/mFOfweFiNGimqsRaTKXCMGi9R3uWQzkEKKOpG7gC9

GnQ8+AyuCbObhc7KNHyS6E0YOLVV9GNdUiuL32BAyr
zFhU0AkKUcJIqC9IyGN9t7TtgCSVHC4gzxtL5wW3hOZog64sPlpw1OjOs3f4tkOvIyGdNt6r+VLCUfjD

lUFLAaMhefIcVVcHJADCMvC9JSbc5JvAlucmgg4UddL4gAEI1e0gUqpzlsCtrwcyMLTwEZOrICFVrlws

WddmrWJRSvEtNkGKOVnjCBuvDnV4oP6/p8X0mNXmOx
3mT39gOkX6Y89IHwKbFqmPFGVpB+xASolQsKistNfShsN09nn9bH/ISckw2lSmQyX0R9FxVvnxmzFxpq

ItNk8bbCwdZLoW2ZPZ3yAeVPfkVIxwT/5FKBNbeMufaTkeBej92dYQVmaasUzeVWcNP0PtfBMEABAtcy

OXqmw1VpVi3Aooqu7ba5GVnDGfpNZji9AgUAh+KHtF
0+Xzoo4XEUYv+Osdx0i3L7Ne6CXG+fki6IVF1qw8Sll2ATIrgfUdhc2OrivdEv0KSxj9H5hw0AMWxmu0

Dfnr3YoTsVX8M0pm8RMcZFTtHbSYY35JLofgufjrlmxU6Tx+VeJb1tvuEk3VrrvOQl2GdArHeSW8iFti

8Nz54uPXwCdpoBXkyPIVk1l/lLDv0QsxSkKzDM/LGI
YVGpfAdebdppKGqnjtHTXw/kctuXTpaE2vIctIctrofVO0vWaFVal1l2fvzNv29sg1TpDsuMjWUAlE1q

Sz/xHdI437Ca8OAmJThAXOye1igFEcHUnETMr5yqiGG9XoCG4fygyxaNMOeDCKDLmMH/kHn7P8TQnBCB

nFVrheja+vbKywSVMkAlc3KNw8qJLDEHHVLILndsKu
s/BAolraZppIKrCf9NAMExKbcLwrW9xCERr/awLVSQxiueapO/MrgnblwMaqSIrYKcrGLpoN7pVIqD+i

I9bld6XTVGsT4vGUcjAKfpu8Kc4IheC9nKl6yYTyLRPmHiI+x6tZ9nhWNtC9iEFNLjW+tYgiTTahjJLj

2xDKAK7jIyrfi1eoDSiBggbYqxQrx7Xw2iMw3NH7WK
7OJLVeWHYhM6f8DDpc8IXxoBEJX1NEiwFD+OswEZ3PeEA07Zl7NJDNyxPqR7DXNCcmmE8Hwj4tln9lIT

dNNx6mEflVP/C2LKcdRv+fNtm3Qw1hodM7h7n9qC07oqTBEki0ja1+yrYVlqGZz0Fa313kIkZxflLwKn

//al9lAR5JRFMQLf7kdYxnYkR5Q2Gy4xCK4btW7eMO
6ZwOCp5m6FdGBDtGKTAY4tPktdVv0NLFEt6VwJ48aFn9H15gK6qovpwt4hDLzzbApXi+FcenDAeEH4p3

vY6X55IfdaV6J5qb2rDOrRJVP92gCBOqpFeKjse8N9Bm5jDFg6IPWzR3vAVW3Sr7aVxZZtEmkP36M4q8

a6CnXPMz8uoIRhgBK4IPCHg4LtPI+4Ud203etMXh6m
a3T89D7lDs64Huz77LHjFgAppUN78cTo47D62tUCFqiu1hHTlatw4vgPZ6idc6EY+dXBV7Gpa9WPxCsf

eui2q/JHIx4SIpC9PFPuXED5/l23cPKGvbgTjNmIewzQMvFMWDNH6UO6imsTWk3blKdS2tR3PRBMbVLr

ficDidjScPjb/pDq8lUm7LcrqmN/aJgwA9F1/7fD0c
TN7J45Ffqn1EJev7s2lzjUvyq5FXIIuRHi1x9xUzyGPL71FQkx9+X1tL16foIfbGXbkCkdvcyqZxySiG

RLH0GpdiFtOprYjP1/jtaI3e/i1DgKy0nexAsufYlNd5Op3jioMhP/T88oPwMnqMRHu8wvZRdpUHezwA

8i/EFWXNWgEN2KS05npUUPCrU+G+RRf/RC+6dBjQoa
KZGAkyOzs45RXAdIxQe5GEMIAh9ynoiiwmy1CGgHrRaPrwCwBCzs6hN8rWB1W1uJ+WIZdbinOPXGSSRC

7CHNc1Gc12n6vc49eYBYLzj07c1Jtw1l8rc0CjLbK68iIyOsfYc1qLLGqrEg7IaYlcmUhCdA0UB8bB9T

HzHBXUyPIRQ/Z+KmA72xd/SPcmHN6LSzRqWEH9zmTf
eK2RGJ2th5XtIYs5TGJzo6FgBRayLDa0WOepLIHmV4L0vKXbIPXaXU1UZNPrPW0CIVEkomIjPuEaHATU

WhCbOLQlEtLzz4BrX4FoMWUxJVKXDLwbRVIbV79pWNrtOm02UTorMVCsHbEiOUz4Pt1UGoNtDTBwC94b

zXBfyHMuLLMvUWXBAmPnDBSdM2WtmWIeUSwcZ9ImI2
LnTH8gdXOgZX1xvMDvE7IzN1NbhdkcABEHIhMsTTSbNEwhAYDlLrVwPKldOIE+Jc4MNIA+Zf5MPM3yi2

CbPTj4HKAth0FzSMwjIWa5H9KkzNGceoXaTltxjXDDBZJsGLDtT9beuoi3aAQyJDr4It2YKvEyq7AjIJ

RuGYtHle1dS4ZncMQ+V5NxG3bzGhhib0aOYcEbgSqb
WBnNbZ7cZboIyCwDspYX0Ky0832XZvC/Or1ggJHjQo7RZYzes7lf74SUPuO/u6zd2yYSVl5+vh66j6Ru

Xah//1suRzGr6fLnR1NW1k/V+mw+Hl9Q8fgOh9y5Cg+FqcKb9jsxOVq2H3TdoxgMz6Je6HK5J21g0e56

5/zzgc9Z6Zp4r/5m3j13NXRFAXx/Lb07wZf5cr9XCN
NWdQTY3QN8bpiWddwuIyZNI8GUQrg8G93f3y0BOW0aR4U89dvO9l2e8xSchdn6+dBHSyydqwR0mKMK8/

Zvf3V53qQlKKX5Gb2hNyv4Jcb1M33v6ibLmg52khyvPDEnMlnvP8JJuQ+m1XZU69MJ9G/xDt54osTSI1

pxA2ZckOuhTrLqPH3v0sDhBB31iWTOJireTw7uyCJZ
b8g5rsj9/mPQSLifJaCbeGjUsta+euLMyW1CMxZQ4UoARzORyeRgI1rwOGz2COTTjhUydLpoxTvAOSWT

RtY0OHmF4FQSKi0cPSyautXrUUejJ1YB3NthtHu26muSq6ZTMuZDynUsOkXVKBVcUarG1CE8emw0KRlh

UDzeFvvQ6fSMhkEkHbzs9A2EfRfWpdPzpiGpZGNp5k
w4+f2VYQT+6z/U8G0RZtbXW08wZNRm7Br/c5N6WZKXDfNfC71YuTbZW2dFVau6kYIYiaG0RRDWtAkfKd

52yec9aRb2oadcFdIWi8eF7KHeO6gYD9vzPQ38fMSaOTXWw/dYfADVtd4ttgYKijFEIbJnhLnLPMqDk1

OmdMzovK5PHSedCHcM7UmaWdJkG6litgWYbSITOEiF
RsuKbHdpyphAr+q3KsR3Ho+c1D7eQLyMWDxe0domRNDn0mDGSSVrHub56hYtXR6RJ/1sMQRxCZmaSGkZ

JmAnHM2JwFYmXiItaslNLxrlRSRg5Ts7WXDJ/bubsvV6Nw47gdGKRa9HHP41EXIfzbuujHqu06yXiYp7

XJ2R2Ac6Vr1wl5BHWEx1lyWRuZ0HuKJJuDI7fpJ59o
aRehKmY17cCbQlU+gecvOo8GRbC5ZLPKwzv6pLdLZNFG4lzEonqbrd+CjlCLhZiBHAwNFzkPGZxDKim5

AmBcYsqvrJUwPO6GIynX1YNYtOvFCPDtlbMPSkbZMa1kMxEEqs6lXrsDOh/EnTvS6dYUC0miPraFpF4/

RdzVCVrjc5ap4aOJYg4PHbpamMJUl+5zsfEHeT2DMH
fdeG6/GlppI92LkHLOfNOdxGxylbGGM4D5VV4QRC68XKGjVBOUnExzqhOzsBvzc4eeYKT8ge6wkPkrtc

Oi8J3wxxaPpHNrgSVvPJzRIyxINDrTOLrEcKIc0fof5MMl7f6lZL/fhXKAkYY3lC0HvltdU0GWgg5sUs

rFsJHMstsZCVQEd/6yiGYT6TZseXqz/ICnG21tsqGm
iREFO7+GIIe0La4taDfc7WQl2llR/RBzNLIL4lpvmEvLsKklFjW1dqY5OtXHsnMnCnEjMUFAJQTjfTXW

OSgaAAzQfMF7vbPWQd8r1OIiqtQHv5UAsbCRTtTYSJrFAXd7jI7NTqZAqrlElq4uv7hPpe1JbkHqFH4Y

HCn5odQa0nPbyr1gQ91dcSZC3fGh7tD2NpFs2pLoof
bA1gQxbVf/5dP4hv/jx9ls719Cx0Bq6au/KOeKq2eaqwXce9M/iRTcmSmpNrN2pA4Qtlj6Z7IoKCuYuv

WV0l7n6sTnbOicejM0qy25BxgAAoe1frqjOfRhsXhdv1tTif9to4/ZvI7+0rrorG+/PtnfzbG3gw0pk+

tC/GbkvUBPr9AQUkCAOrbeeg0PokYMggw9+a8ZP6hi
/TxTAbB1jc9KNqK7LV0qnbLR4UlPiI2bKfUAbCGoe03L1OyUzDNVub6tBoxdc4zj2eBu4U1H8we9g/qD

q89IderON4ciap5hzs9w0BS2p4ixVS89PPkcTdoPlBBdhfkXJK58UyBkjdoip321x3FdM8xUM3Q4dhm8

/Xs15Gv04VM6SN+cP1jfXCd7LkPvW/Bv1+qnG89IJ4
f1JdgTWeUhOtmUCtj96ZnD4RvksdQuoLCcRVL0b0UXzoeFlmSbFKMsakBZQAGfn44N5sn8mthw2PaI3D

kCBACH5961wvAvR3fB9AniRcy8a3GMEOAUqi4U+SKItpQXVREB5Hg/2iJzAMqdRPpjc765DVZQ90AGw6

0cZSZV3l0ZbDdu4sDkmgyALggs/edUx1C01O5Gxg8X
DF3n2uzyhLOUhsJt+XxXj/A+1BjNwJwI1vKcpqiGqyOsda/hBtgGpZ8dz08aigU493AWEmjHx275ioEF

CvYFQtTbdSnCHj4X75qGiSqj2urEwS3xrDHsRN3vBhf3DQeU8Zr/eRdsConrzG9YWCU8bb0xVXmsOSk4

+Y6aW+mC9gojRsaqqXHtN3UIXkSXr3lTvNHkGVgSN7
vAAV4HcCTVMWf9sRgi3o3iapdsbJaOHooyqW0WYv37TAeVXenNYRl0kkW+IRjYaxZnjLileRZnqqjDLP

P3cCxDadLo/h4XNCqlMMydiiaeoexwHWvH7Q80idwQ7iJUgpCSpbWNH7MH4bSMnoajsBVRLb5qEhMkWQ

xNHJcD3DBw9JfErMhuEG5fWBpJqDD1RZ5KhKX7fZhW
0sAd68/Vc0ojJLiX9RH3waYqMDEsseTK2l6vSzP09XFPtIdohiEIlHIFTb7JJLsUwNn/FmtWDqz3PblZ

0gbgyILFhYfvt0YKY5lA+Qp+qPRnomvv6smyWqteKDcxG9e/PjbX48+Lvb3PvloFrK5cL7Ihu2khQ7Ov

WemVEt6C1b77cazvv0pC0G1O6nnJnwhge/0Dnd6Yyo
2tFp07S4DyD2v65urvzTPQMevHfJVmzx7GBR97+rfR5V6/oqs2jILyY/JHcyDTe4+92azG4wyOKRlNRZ

IdhdxmbF6GCK7GmBAIIFGl4k49cXwBotsB1S+LB0/PxqLpaAOJr0J5T0Gkk7YiamwOYpmuEkUGc2S2wV

PKB71DpOXy2Z+7J1a7yCfLo2h61Cd/uqV98/bNRbhX
yKKezhlGnc6xfbH+RhYj5x1lGshmUzd8jhuMrFTQtDCryBOc0FgH4yQd551jygMvyUwLAVkDcVHDScpn

FxGajHGQDz6D8LCrpJ0IlQvGRCF9cNaD0FBADX6/GeXYecSUB08nX4Iy9JOPCxtOARtBoxeaCC2EQKa7

4nPqoTA54BAvWJAhM+nAw+DvNGMvB0nwBjS7rS4ofC
AS5/5oZBBbFo8WJuDPxLDpn1tFrny4wreebZBDoROdjooFNTkXO37eQt2wiwb96p5vfTB+sdL8A25nuy

QDGT20gj5KGNVgOsM5/+5HgzDc+uuRVN0vWWp3XNzNygBVcJMl6snGOQ/Fgfe5CR6Q/Fjl10WZwMzfUr

rTSQgMfSQP+n6G50d5Gru/e2yDgtDRDmLKUjVd/1Vw
EG6ZPHLquphxb8PAYwTju0701xc7WDJY0vdxcWri0VrU2cbw/PPgh+CpN5LDTWj4Kokd0impLefd6t82

xYKN5XIZ0zmd+Y2W2CIpN7X7iYaN0OClhUOIXjTVCMglwfykA2igeiqB+omxfsPA42gFC/VlmpXAJ+mc

yf0flb0bn++0Ay5gHL6+6MineA5krzWnaVVrTC4ZpT
hulkuiMDo64xvwSaH+nvJ3oTYoju6GS/5vmitd+gDDjoO5/bsLB1x1biI8LDDNZ4gEfj2nzsfIJucZ+w

vFpL0MzB7l58Bbtb3NK2eXZq83C+NCkedSRR1GNXiCxq332oU+4goXIynlDVu8WvpVjFT+X3heukOVGz

NznAWCap5g+HATSQjn+kn+Bxbya94NW7C20834fF26
cfqONkRT32ElUvm0DIsoPwwgLtEj7hQ+n9CUxjH/usS3dhPg7yN6LFV+Nwj1uYQ/OubSy+Sln+6r1VES

AfquPwWdSTkb5tmuow6fqs74I54D3IM+LP/bVomddqNNtauoBsoGXnUG8NDjNxVH2hxf3WYfAnPT8ysm

7ZNNV2ZX0ISIEvVJ7IxMIcH2YvG7HZJvQnYYTzLRRe
ZS24OKNhLNHNBMqjXRGbB4Bgv961ofTjunWpARPmDv7DBFPhJF2QWZNtSTPrxRJqQUSlKRItHjC9ITWb

BIatVIJcY6KgrdZeekEkMJTrQWYWGSorXBPfC1pme9HvPPm5PO9YBO2XenEfe0TuybMxQ6emO6BTRG0D

CHAcY8BIY7CmF0qzJjTrq7BrA4uvFoOnk6FcHb3JJx
LvIi9OGlLuTL1wfi4XCKLfLA6nyu8XHHU4CK7SlEd7NHDyP2YuKBMyQBVcz7TnJD1YDQ1pjDbsVrS2FE

4+HJbzDOT5uiXhfN5YTMCfCsmp0bWQ/767/Q/13N78CV+RUUJ0hJhM63I2E+59loCuBVuDvSuKU0zfhu

9+bs13yEyQVXJlsal3HOFeAOJ4VXXKEyTs/PnKtrTL
OWeL3+PSLMf0tYO//gGXq4BmPua8UZYBp/tflHatgYJFAAgE144ajSdVdczp/eOPlcoVDotUio48zPic

FieT/jM5MDy471qpHNSi2+fsxcsdyFxyn+lCve6LugoCRnkC/zVn+yuK3ECkRQR+57AnAffYYNTbDdNh

Mokfs/9yZg9Tj/t0i7l6a9XbXDLeo31twnSs0RHHUp
zNVXDPEUu++VEdQpU3GB3nRzblFLf1knXFZlyV9iRHv/oZSfpqeoiMYp9Mp4EI1SZ1ueP+wUsoldPKm2

sR0P2BXrlUVtiuJOzTyHDSIRBFqWMXKyFuN4x5fk8WgoYoVH3P30HMdDoVFJ4UXI58wa9UjkFyPVMJHV

1rbnV5WpigV/CQdcinA607DRqcyFOnpI5l9SvHOqRo
YgD6t1VEEcvk1IF+4lqsqFB5fitAxUuyI3qKvi4Is2Nqz3eMICWRVjUoM/jTG0ZwVPQVW8TxNB3n3Bc0

GUFOtDbly5n+7610yH9k3Elu0k/0Z+bAaZh5EKmSbzuk76ZbsX6jtgs7g+dNUpY+1OGLfRz+OMA62x4h

A1vUXUu2RAM9TraPKVrM20wY/IyEGoP9DIL42pUYSU
4S96UDaSr4slOKN6mteks7TcxK4KYCJrt4T6pRSiWGiYW+eaQbbq4hlfdBjr+ff2GD9+zfmxZwYW7eQl

2PECEYgqzfW8B96Wk46jBv32ifZ5wE6qtbJTNADlmZ3kCNht6pQnK3tWBctIOGLhWdIQNkXlCr77UaQp

5D4rgoEYan5gQ9WiiekDw1gSCctygh46HgTu2PtdxO
LL6XeH9oNcXbLlAOQYTlFIaMRhfOsiMDtJypQ6Cl8UbHy/pk19lupU5qZaggyA/QsSDZhZSckV+5MwwQ

Y4VabDHLYFUH8TaIgnt5REpDt049sFEYpCdArK7cBXsU/AiCFxn+7HBilfk71ZC80Bq35sOzqpq4jtyI

4J2UX3DN8VnvBgREXNyWWSX6lOKGxi2KRBKuIVZPeb
DwOru3JQIWZI2Uvf+bjN5OZ0GqtYOzHdiX/MIvllPefGj3g3V3xhpCm0hQLLtO0UsaT07rn6gnlMrsHp

J+4oI4fVRxVS+egnBUoZ3vtdd6LDkKdCeGoPOagEKjH9a3NgnLrj+xZwsVdWwLSDSoOaIkRaKArpTQuy

mNUgSP9ivkmrENSCWfJ6Wo/kwK1WA8rsva2SVuafde
URB+LwcZ77AfRLNoxaNfirfuMlywHHYphVxU6TpWod75zNkpJphS8JbJ32OmTBNEEUkvpYbeT+X1gmgU

C8Lgbhcn8CoTChoOOd5qQQ3pBm7gWeAp6WZjkxqZc8GW34NUuKGjFvoVz+EwYX/QKBiZX2cSdBgZGC4y

oeI/LqR2jJ1ymQaSaLYda4JZ7kva1u4dNdMjDJ4LZf
T7gBvFzBto4IROCEy54vY45y+Z3dEQqiNguzf6sKgfygYS/ANwf302tYzNqKs+YoOssxHel2mmpsyPis

ARXJAFlQ35MCArri3Z72DWhh8n6QDu554JmCgT/OBgHmdsFGaR+LLYRDPbwUbFrlptzHHL6oDJLIzbNZ

rpXtiI6DplLDSHCiBr8hJWX7cZdxHcan9GgSxnoxWK
2pK6PyMSua3czYksi+amhL3qIYytWIPNgnsib4A8rXAKp6dXqdcXQ2gvXp0AxuX9QCZACuFKmAFHY3Pe

boM5zIbM8j95eVuPS1rzuB0NZLu9OQ+BIxRpLwFjsH7FLW7FH5J63+UhzpzD0DvgdnphnPweU/pS13dc

v4mMKuVNOdz6TuYCQvhfG4QuSnWH+j4oJIvfqF7V87
6b+uD1Ro2KrwfPwmseeaWmqqm8CfwySoqxbcgoMsxh8xc+KbsX3HhjY+wnwKrNoGp68wB6FRIh49l/vc

EgxvdFkZT/Hp9Es+xyzcvO1+5c6L6Xz6RRfb//5VN9tFyshIoM8+LM+YxwgLXIfb4KE6znljtq45Ut7F

nGtb1ygagY8vF3YXhT2kKluzijsAEdZHLu9LBsxHNj
+iiZzuV+0H1VUg6pUzsMt/JllhKUFe86vDEJdVa6nCilTVSMX82is44i89DrOvVqz3A2aDM1olHLBW5a

N8dCaydhOK7uM4gbtL+prgHjH0pdtA90O+PrEcU7sqe8TcAjnh1RomXRN6fjhXXGvTZlR+80JesPbtWC

6ox/zytZwjlfrnjbSRDqzIKIpmrG3VbeC/hV+jEIGL
tYQEAll+1nPU3lcVRrXvJ44DRiqDTwpQRsP9+UOb3rvsuvOv1V9rNbY7ZU0svvwiA+ZAAfHjJG8k3cE/

J4gX5m+V74gawMQM/BeKftrimHn60j/QjfKHc31X1E9v5SAod98zeawc+ArfqO/aSjQ679JJCA9vkUmt

dT72UmkjpC0BMJ/YFb32or6T4nfe6TfZrk0P6UJ+UW
4P2PcHIalUTiD/KtDFNKgj/Ndc+lvLuFrIaak/P2yBrP7VQ778WBrPG6iTk832D2A7gVB0jh9FWGRZHT

JtMZBBDyptdtLvMYdTe+F2CK4WC9gd7/bym2aUiv9ghewNVxPzkgi0sMdjmMzZOaWg5UvGQfZfuugmdp

sxjOCQrkaNZ9Hss0d/uTdc1ZVDuOBCa8OLSJjHzlL2
hpLN52vZwDFoRfbiZ4VGCUDUNWBLAjmG91nldqgCgQ7cEXf0+y8zfFlfW9AJC7o4wk6e90XaHMcP21r4

Z0npadrSVwjJFo8BazVdMcWn3DcqGMQFOaHUxmbpoOP0b7mMhdZeUpa1Z3qRezcrbpLDPM6odf5MqE4u

ndic2zkMP/OYRjQRJd6B9Ad9NEifHwZ5Zwq3ZlHWBZ
kswFvYm5H+kLDaVD4iaBX5TX2WhtXO3/CzQtS0af/Hs+9n39Xu+3rJBDcPi1mhfwQsVRIjQPrNd/AazT

FsFEHgJwGGB2opVfvM9KWE1to1KcHPx2MEDpz3WuISaxVYb5IGufRDAoW5D5xLKhMNPcFK9SIKVzJV0S

CNYvctYaWcKsLUVOBzXoYXAdWqOui0YkS4TxZYWcLZ
EHUTxqYPPeE56fSIbiNy45DMvaUSCtTeTiRUe0Oz0PAdHuZQPgD15qeNLogXSbEISqSVXOQmIzGHZgB8

JpkAMvVSezZ8UsV4BwVX4ygGOzHR7ifJVyO3YoB0NtqlwoYAVGRgVySRKrLYjoAQJsDbFkTHxgKLR+Pg

0KICA+Eb5FWO7yr7ImUGpmOKAtTX7eut3RXDJ2BY4W
gTq2BRCiR7YjCSSyJUZoy5NqOM8JZX1khIsbWuF0XF4+HYpnXHC4wwKdrQ4OEAZcIVlp28wThg9k/0O/

cURkGq2sLXZ3UzKzQWn4OpLBphHu7MNbaV4Ykl6x7f/T0uEOClclJg4MJohFy0GCCm3l6P/J6a8OECZp

m8Ak9DmROpf/ud7w1ItnSDliN2RYyNFeoitVjCQp0/
5LOmJ0tcgTk87iHtAMBg9JJ+KX/dFi+ev+3TGN3t9J4lW1XdOX+f7V+qao/teHNJmk+ln49sA1IYG8Nx

KLJzy58JaGlL4EIg+dJcqRidFt+hKYLQGdLhHXIUlHbAWOm9o88cS6ParAIFaR61Mu6hqtY3rZioo1gW

sx+j9qlBLjcBFbA/ToJifIGhAWdqj73ZzQFYYXwKPc
UzI3FZoe267ZZwoYwW6R53qDGUbtO53m0eG4q1A+xPJ2uqqvtDH8S4gcugAiAy2j0iQa7AfLr3Cufcxx

ES5hW3Y7Vd36DnwaZSqO8NpdET8HOJImp2ua5zwmvKeHhzx0FKhg+NMnEYAdDCZLr2R4CMkxS9dOTGpQ

bltYPl1DA4TaPgLuBefpiT761YmLZKIerv92kJPmzg
gAS2Il4MY44QPRV30LGPImhdVW98sDACTEh4KTaBLuVyvixT1XSvNEUK692qKJiAd9ffxLJX3Xlj4aMq

mqrtv20dmXxsgCSLvWs4hmGb27crUTwhmyKGKIzc/jL8VtXizFgxU7tKo+X296qNY0xyVUWIT0DGnMOZ

W3W0/NWxTunHT9YnY+gLoyS4Ax+PlxbMJWa86jlfiJ
phnziLEcGpduwhHnND3zE6GojCWmNiqH9HzD6Ho6MfpyjZi5VeubAIcL6/TN7tLnxqvYBdpvDfwzgyDO

XHW8ojoJdnU1r8mCa25j1Ur7uJzEV63rxqw16pOhc6SazUVLqSbCPKlR75lj9yuoSmDrZ4wpCZk89y60

ISWG2PP0QY0JzejBzt8bVGH27sv8ATKXE5xT9S2GiF
4TOGpmlOx1vgS7FIO2PG8QdFIutRAit/T5CKqkD44W08VcjJP52DxPNYYBngLp0rNr6b5v2vBeHDEaXk

ofHGfJTVqkK+NGV/MaPXuM0x65s4A2OyuzRjSh3EzPJ+MmUjgsJirzD4/TLHm2q8zZZRqetycR3GwZZc

/L2uON2CBo7Th+1IOIpxXrCAAJqcsvRExOWUiAve2b
SojoibR47O5b9DIR9na/EsOwrcdJK0pUF0UDt9cTHyMNQ6RXnzDr4NgQbPU9HrFy70pQ0ybYJvD/7tIX

Da8syCZ/fHT9l4jh0MqgQ5cfx5KW7Ww5KEz4lugWaq/jW3awi/v653Pygamw6rUoK5GOSbowiOXE+soN

DNaZM3sqifacTbhSqFz4Yyh/Ujv0TOpmNcAyeyw+7z
uVQijup0FDb1BVSTthUPX+psM+OwbI4vblk5joK64VQuOx8DmoBNFXMTrcXu94utXqsRVOseDaIGc2wJ

a9VEXknrHiJkltHMq0Rk1Hm3W6xvbwPF8cZsKT3drziwcnlJ0EVMDAN1pbGJqONvLouPWW3fzprKMRxM

kc0iWIY08kEhvVoodFU0FJdO46LvzglMBn2ANabqzp
gJVSlQRHSD0Dr/S6yrbeh3xKocALtUK2EwfsCik8pdBxtlqm89XWq+LO60jBq5xj1Cmov0YPPin8qj6h

65oUt9xqp1WVzR93NLaHWtjaYBXyk0rbWFqDxc+7IA6xNr7NOQ58iK/yYN4UbXPtJ7kLttXKzhepLZmz

BzqjoSc4Yq9sKu5kjTjo+mvDhxbVVxcZKiO/o/p238
+IOsmCibrajgjf4y6Hnwq49tIM+h97cNHHj9faWk23lYhqktsPYS7pyjf5D6iyBbShcH6lM8vLqjyfO2

7ElWmUR7HiZnOLt9C3D7IHcmyrWtdqfS1tskGnrufErZ3wikxeHobVBj/Awj0hZjKJZuVFDPZaM0g2BZ

6jLw+QcGNXVRdc4cdAYqgdOeJtuIMM263BQmPaL5Zk
1v6TcphZhsFL2qbcZ3QowsnOeqqe5/Yg2Fn4pDlsiMfLQ16gy5Aht5v7gFAbYyGldHnXqbd7d6K7WBMC

SR9kN+wfmxmY1zXKEm7/sw77FX7EBs/Iq1eNGPVlG1sC6lkALCnZ4tO6Gy2pIys/Kn/1ZKAhouCMvDqM

hyuUVigsSAf8gyon5GLVeNd/rBEQ01pC9bBhz/6qeX
jAvuwCaBcqeKx6FyaLfgWyLXsFFmQYxgZJuDE4QH/1RGCIvCCS7QGemYhpqq8HXjzHKuuG9bHkFgDuLL

sRYWE36g0yp1YhzEzTT99Eo26HRlgEQsHHw+EwsfBoHzndSJfiqoSgUB9IgiP0B9caY3OWHwl2zID+iJ

zGcQJMcUn40gk7MWjaORFo9sFhNawvlDEzEsHD/UnF
mb9CSqhnqUnVFo3A+wU4l1BOodLqtssl60PFwmyNcO2Rd2jrWyhu3Wck+Nb6hyii9z36hE0rn3dBOoWQ

Nrk53gdiAbXsy8lnFlhoRK88hzuH8atJIHZZFI5nbmvfaPwJu2KJ38GwHxOKoH4wsu4Lb7aRfpBUTV18

ojeDkJeF+7GXcD30zNU48e4EeL4+T8qqfL4Oq2Gc8d
RatGDw2pBcuWxDgjKzURKT4U4kGti6SCkWNkv3DSa8TJ7a6rTmqnRgXabpFrsu97MWaEmujhqRKO992d

Ariadna/FXh2tAFOsR12ONt5qCEpBOh4lKEyyA6DGg5AqfSlj9tDvagVfc2Z4q0iQB75J/+zg2LY/VUMrxH

RU/GwO3JqrSWsYjI2nsMD00qCzgQpba3KSgdC3rtin
oFPESrpptRzdF8XJVzfUFLeRNd/wd8pvEVJoJYXAYrmjcLucCAUMdoFBcIesg7DcDdPSvh5Rdwt/SStG

ndNMvi/K83Ae2k9/SK2aEWP7U/aA2izkHkn1uKNgouV/YjOERuc7HEZH2WD64RGhlTen1sSN5+m8uZ/6

zsHneHn6fe5xmT28sFiZQBgHwORZ/fiLK9/sPCdjPb
k/Kp3Jbfa96PjOlDo0RvicywM4bnNDIU29ebIaIiZIgK0Xa6yXmhBSl9M2XJSfZ+XmRZ4BqrAxx9Vzg/

PR23M/x6IL8iahdJrFdR2kUaXi39loNu1FZ17u0bbl09/cUc3RZwP/h36cCQ1LKFhWFZMydugwm8C8CC

patricia+o3TMp1cerjdNQ90F62/lg2sJX+q24zzwqVsp/T9
nVzotnxXTU6sr3P3HxO2/S0bT06p8BvCF3cU1VCn9jS/+MBa8jU/+MBa8jU/+IRoB4Eo1mMhclTrE0Xv

+MDVr2yq+YRW7bOb1PTWfBZ/3OrxcQu63YwfnDw8ytmYKxwrSzYJgmBtmpLHciDiqgAVntIhraPQoIVB

5iRpTLjqUBPKjxDLoojl4wMqPTvupQXXD1z0KnRuHg
Oc+iyucgn9Z4zJPA0sOUu49ZVG1sDjjJZC1hBimINT7wMeqUMJs9fJqzK5agyTx8GjOp1NGHnoC6YBRE

y1IcYVPYjqgUIBG7gaoWqjtgbg5A5Lvmz50skLU35M9EQRT89L5HKSZ63H1FSJV54vNIL8JgPfBgvTUO

nsTKe8MoXSMZkcwCDXR8lw6fJlpbJVlG9G/Qw+/h+C
kP4BUVtcsfEmtKDkWG3ADuMpHR5gfe3WSXGiATQfXumLNtXqXGgQFyMmVJOyGUccFL8XIXqeEZlvMYKk

K0GbtuBacMYmQGBkFa1SHHEsXT7NDGEzgMPmSCNrNeJnYDHDWmSnOHIhAWTxwUFVx2hwNbDhPUG8KYVm

NqdvXL3TFSPnRJ9En877HM92obNvVHKdPPFMHlQlOU
FkD1TvwNLgUSdiQ6JsX1JuGD9wmBSnVN3vhFTeP5BpO2ZuhzggPUODOsYkAGAgRMkpPYWdHdCzONvwWN

A+Fm1UYMY+Gl3LOQ6fw8RnEYahXcPnUG4jxe5YPPT0NY7UnCs4OXHtM9IaFUSvOGWfn9JfON3PCI0waV

ahQhD6WU2+SMabZVF8jfLwoQ4VQNItHG5s53KI/t6Z
/duaumpmR4RAOv2xSeLD0kPoF3i1XgfV1I+QCFGI+sJAcF35cv1EjpTitxjRSs0kWVqU7hciU8Ocm255

fb+pCkRa322hWdhINeK/e25FeZgVPmonrdHG28x4+9ryV/bS8ve9/PxMr5idnbn64894eztGEaL++ffe

lmExD5rf18pX5nQ3rYizgH/Ge++lLdd1m13P9r59/e
47c/FJ6fX408XQPZNky8JjPeGQK3wbn91eO4BFkzhRWqVYnwuQANP40B32Apyur1P+7CguK6ITqESiux

s0rR9r1EgZ5Tw79xyc8cq0wCUooErfOwQi6zInY9oLAlBpV1sm0RWuFU8xQsuTmcJhDEYLHMffKIb6hR

vWSWKH/QesvB44crgBh5TdFuqCBveDgicJ6WmTL/S8
ejigQ/yzIy884Te2t1RipMYLc06JJ58/PUwDMqFzLVJY4eqNnM8UupFt1B/CVzO71vNOdfJQm6leYJAd

R4k12EP74OjoLLASGKcz8puGzlksDYEhQPnl/FJEBfqqS1/gXj5rloquWh11AS6CCXOTnG07Fhrk89kT

AssIyHhI3/0qlXAgYOvrkOgOrNOuL0e1fe1CdTEgP7
4yB9YCtQQfXocfvVL7R1UgMW7A60Yug03qGE9ZZunV5y/qjSk2ooGWs4197kxWHURB18R74YG5c6Xp3l

qOlN6hFpmOntBnhKHIH60L2xlk82jeVKRZZnoRlhRvKH3CsWQDcWTiN8/Dq0J54yzlWbTr8ydeevZGKU

GCki5t3Gt2Om4cb6hjob5OpevzhMJJkOjVwEuqvKDz
nhXLdRn0eb2XgaPVaAJDfoko+oMFHzrZO2PAacu4YcOTQxCXvMHaKDnd88FSbdH7wXNy6OjDFvD0qMWc

XGsYh3LbnL3BzqLCGKF0lpnYvOTtSp0YS1abdk8DTK9LFfXJcHMRYBOs4NhEC19MCkoZeyfBeYDns9Yp

+SxjXS/x2Y8bI+fVCuNaOIN00zDrGOYL79tzQnLg5z
Baxs+yi1VhPn3no73HPo5hHixzGXqC9S5+ywvzPi/JnmM8xUTBzQNhALwiX3yiyOtK1J9ooP21RnRElm

h5aD0ot8U96Kg63hnPOQ7jDeTjzu5dgG02ajDp+Vb0zspZpmxh9c1wiZjnqltmqjqbK6ZQHoK+VZ0bN5

UPgt5SDVeG1P5NlxOCelc3jBcHnULUy3Y/mDF6HFKw
IYuiRh2NWcxrACAcYREV8ffBLgved6xUPPla5aP+gZNZvZ3TSm/XT1WFGwZYRai37cZd4jDOzkIFnep+

DHBTMKRCby+ZgxTIUUpSoqYJ9Q3zbZ9MD1PmRQj/UJ8qUkIAa0BdGMge8qewBnQaqQehm10sUrxi5vtl

lH1tniDtDbYOb6H+rbmlF1NsrsKFs9o6Jh9v9WkzQb
e8gmPhQkTBg7LaAgnUQYeaMNyAPsw/VYJ69CODsOdMK2blRPoiieF9rUMeBg2CFlZ5s2APu2O4bwhxxd

G+zV2xdieZm0Ua8CF2IDbXkRpxg0pOUk/Djx6sCe9fskghZUohvCTOXMuK8BYHZaWQFVWyXngwTyT5Bi

bUrytFyEeApgEMUNg4P7Q3L1AF6NabGIuPGdpe9kVa
sHEuGvkSssPyJzWZEFVXooK2UG1rhqSYMrAXFBkuRrEjG0lsgCntIcJuls2APryv2pAoEMJtrExcP5am

ZuDhAmyDZLuSPA3zUTp95/nVQQAC0juPboySOsxjvaFKmGlcepuMhUzMfUR5K952HFlFakUC+SBD12eZ

hIkOxSrX28hAB+obA6DVmgXiE0ot0wN6uodQkrYTLu
incOMJSdoJYOpDUHR/GtldNqze2SLT3As8IZG+ZecIPX3nEFR7jX5yd5okrPUlEEQFCtOSLMCL/yXy6i

UeYg+LfM5k8K8sJOYBXVljlV19xt96UU3+ufeN3JMcyhe3VUKopb9lDgCTVj84vnXkiqDVD5TrwfUtkG

ca9ixtGEssBnr7KPYpNbVIuG9ThQy3fHc1VyJG1aWl
ivzsndPb0LIeks8Hw4vuXPba41gkdlhdhZFRtk8CSsqknl9Swbe34CanzvQ1omB2j04OIUCo8C1uAKKE

YpJOJ4P7aWJlrwivfnVh4tiZikFgHhR7BlzL1VROWmawRloyHvAAOy8a0iA4iOofngIE8mXdKKEdvza6

S4k3gSE5urzly5sInb3NBXXXRk3WPEzE31kH/S7m6x
G5YayE/ImKwO0wzPzHWu2u1dA2vaQI/FT0rodWHkJsqYsc1jCuJGIPxs0y3FmeyOIQbMmJQY3hG5ce+d

nx+6cCovf8WF8UYZ6tVuNIOesc44LNQhMVMAH+qiOFuY4mwVM/btLtK3118wHp6ZAPQux9X5pbs6tKBg

69IWSXPDloRqr5xu2emI4gfGBSUoaOk+xnUgk38P6n
8OwSPPSNBpDBrbWIoFN+Q70lpPeJv5I0kOMV2KDG/aAbmYCk7QCWPlK541JWzbEDC4eDqV+nUSx09bjk

hASW+W3AQRSgLf8rjUnGcJ7B0PW2f7bhK3+EgFhUw7k9Yn0uHhpAipHF3a8SLYgvYfQkTb4/R+7MWQj1

NncGlulqbBIkFyxoZbxwbIXlMdXepEFYdc2++5iPdU
6wwPaPqdZ4iJvZBCCvnwHU3/a7NthaUUFCmdRGoSmbImToRVCCNTLWNzUC/H+0ilAeyx2anarmfhGIBW

AdTgvtRVM1Di+cta8qgQKEhN1EC1MQGBsX8nq2/trVGDWGMWUg0kSLawH/dWcZX3YaCuK703t4CdtRtD

3oAt8p8/EOut0/1ONdLGauAq0ZafOohuTMakkXFR1J
V5Y0HEox8GlkNL3lp1XhMVRQqknZizOPlfGxj/lG2AObHJmD5BswabMFDpenh7moja3Ko4+eEpyNsnGh

Ur3x6kTYesb5ofvx1o2QSMY9Cx+9drvNmXoNgR+oWUUbQm3fJbNxzdJQGwX7k9QDFF7jgPpZLpBn/di4

PADpHivp9ClQfHXJQcgf7KESpUegjbBkBEG+UJDuY3
eXEmmIyqNGqCtGKDhr7MuYsKQ+wTESvvQUbJG4gSKdk8Slbgfngt5O+2lx1Wsx1WYrFtPT80aI1aURCB

fE5WFV4J1aVdQO97sDhpMnmA8Aho3sgQOkmEvy/rDpyXkZl6wLsf+FO4bJnwVdgtHyhKVBPT6VUTovhm

LLde/lRU97DaEQFKdIfC4WCp9l3jYYyJfU1OGl1sO5
KztwnqrI4e+O2w41gYf8n1f2Vi4TzuCKcb621C5whmD3mvoYSlk7WOxtrQqJq9NGNTXW+CD8GaPe+2G0

Qr42H1hVh5FfhqtUpsCIkQzMXkj0feiaiDRSf72Ju6BWLSFKBBOpIsXqpnfgO7wtGqcXB1Gz0ZagpXzA

4nowVFD0pUyFvTxuZO8Krb4NSZl8BsNLOy+RNpOUgS
VwmcTiTiVshjieRUFBHLUbXRPjyARPyzI8ZjO+sueIC8ctvGmk041InC5dBwCotn9Kv59i11JsPJ4eE8

I9LUIpUyss5tOYhzBzB4a0hXQsmS/Vhrjy35bCUr4rxEuxUm5EILnFlN4zZb5HUjS2FcYzq4LjPtbX0t

uHvIdTgp0SzWYHIQcKD1CcOPVPRor8BW3Ck+IYS0E9
UNofa0jQ+bC3GyylM2t2LW4jMa7Q+yOfal+c8l3mrYulNuzLmDNMwiMnpsEsZC/l7eZ3TTEzk7FZD4QH

amMPCmUNeJMW0n9KrTRliCt3gvBRWC4oy5tDozsk9qas7O2NxKE62Pk3D7ks5Y8HLTsQwBm4UIszbltQ

/rm4qfV0CjAo1Jib7QB2+10tJZj7uiOZlotWBAIaBY
T6SRf61XEYjscCmmQ1kbD/YEfxgBVmcfTgRDaKqOlTK85SXj6zNovuArQewfsHvc3RndOV9LTIC6ysEf

F+C4HXzhUYH7Yj8TgJZJXf3AuclXbURCD5A0AZdGzF7TBDJ8YnYf5wwryNJIjTFdhztUkIAqMTEocLVo

GlJL9HKp9vBkafoJOaMAseHWcCvrrHUQKU4KogW0uZ
+0v6X3GXmuC2YnBTBViSPmB0TPhYfeGDdFeFYCgdAjbyNOuCgOunE8VLoXcfSuwLntV2Xepx2pZPurYM

HYLHbQqjOBx9eWOkYmQ3sniFrI6t14RE1tIlY/jjexhvJ8jG7HUFPJlBWmU7wqGABDQxOoqYeSZ318tD

PZvkiodP0UegRKOdY7KjhXNhYbX5EcrbKzHuWg7njH
GRjWZmv9+nSdFK0fteI/mPsD1otjAu5HTmiGh5hZUs/QHfr0EIud2tQWZzwfO2JLXu1t51mlLA2PlfWk

PJIO/3hsTbs9F2q1RLtgdJZTd/3EHcBi20pZ2tDx8H7eXoZKGU32yG/fsyxM9vMTIA0S+2W0EFmjzbwo

ymVfqGwppijZEj1fM3i1OuJGtjBwWR9R0oc4YP9EzJ
qG/Ir/1Cnme8mC9ISW9mr5QuDKHcNZlxyqHmJlvCWrQjOLAne6KaHVhdEEr0KOpzGCDhW6H5xOTnWOEz

KC0BERPzIY4BQUUbobUiLaTgIQGXEfZfPGFrJoGer7SbD8IzZBFdPUAIQMhtTLNzG05wNWwlIr55PAki

NUYlFfXjKQr5Bw2VBaNmCBHoH54ryLNunXZkURTfTO
LWCYthDRLmE7rir8PoKRg2MW7JDO3FlxRvu1PddrJvT1nrX6XXFS1BJBZaW7NLZ4LbZ6xuIiJzh6HjK5

upAkZvo7VxNu6JMgQlGd4JDzSvQZ4qlj8FFVWlVPNzOwyTJsPrYKlnRsgspMPkHS8RxKJ3QGYaV83uGE

QzLMHgZ8IhNXIgQFQ+Ys8GEXEmjABoEB4WFlkYfEsu
h9yCXF3faX/kLrwpTAvED73+YVCBaVMjfMOLkL3xaq2vKNgONVcdHUphi8+EfbVG1cWCxje0QxQMcg+P

4N5vRhn8e2/R7TaTRsLod/3ha6uMI/brL+xa9wQA3PtHJU9/i01zGeiLwCTu472sk4PTo0uW/naLvl3x

o9l+G4++viTDeeBM5xR804EL//JtvfX1b/eO3d2/h+
XwLapfq9hHne8o2GyQXr8LDISODfpeHDADd0eijUe0u62VNY8sbBZOietuCeZX6Buz7mMNMI6LW6L0c8

nDZn+g9Tw1TdqqX41da4yhiLFa6lfks21c1OyXMthJ2ICCabucglgfdXxhYSDgYDMRk/rXdMa0hA15Lh

r2MRMKgXsq+eDZSk1iHLgDKJUkMRceQ6nROkpTDS9M
W4UQoT2fMS71/zfiwN1B1MfbSHu63S4ZWwVjRpGMTKXejSPDzNlVOuAxivVI2vh6HHlSrVBxSHEneZJv

BRBQ3ZdkQ4ZJmbOxWrhIjymrLBakEAOSiNTr1cciBbitBiYTWHHJGMz+SyNBSUV2ODVwuQfFvbZAEXHI

p8yhtk+E1iTsngpmRanu8KzUuaFWOLs3RetCM5PSS9
qI2olgUPW0ztgL8i/iZp9p6VM+QtR+3jg7itfFzwDk65eIVNPT90bW1SMTwucTFMOyT3EIjJ/Bfhw/8c

E2K5Iy+S9zxCgS4esbP/y9Ge4SShD2d6Pessm3LhcPVzNYvhPuOTO532EApDXFkLTfzfOcJjMWIazFZC

O0AX9bHXCDfqgM6JiAqbvZd1G5zQOGXBSnMnYUUXWy
FNMOBAup6amdcIKZCE8PKxkBS0RR+m7aWEtMIgA9jyEANHbbNFDY6Uy2XOkWiWAvcG9oJRUNFMV0W30n

PrvKd+dEoZsObfatuBJK4MC1LADiitaScN1dfcir3ssvZPZOR89gkiJmtzyz46oHZ/FARAH/ZmLvWC24V1H

6oAeYNVT5dLb6l1T2T6OTrg2IPFrcl3rpdznRYtqhm
YcD+pZdlDx6BXgRUqPeNPDHqzzhYCsa65J55tT6xbjg1gm4hVZLen2LjzKK057cakAJ8lIMYPwbxGMa1

RxI1bmYhK47aeCgn1RxFGZ7UvtEzt9ug27nw/Cr2/bK2ZbvQThvijElFwDHGcTgrGZBt1+ZcwBbuYr9V

JdF3gAx0gnb3pVsMb9EtZsUy9mkvCQiEbwfKq5biFo
5ZiHdc4B7LnpBCJEWyzzqskWcf7ITyNJujbl7aHgYWWaOIyJabtEPElopaA+l7gmIsBVowy3beC7ND+j

rD+s+ke0CQToqo0zibREacIIzw3WC2NKwgGTwQ8A5Ew/N+IoKqu8PULs8hJ5QWSEdzibAMQLfJF9pnrF

W11V9cr7V0k6kHLYy4xExK/CDCH+jspEuOfz7prZb2
A3pvCMwY33abLFArvs/We8CEI0lViXxruUXCOy6h6n8ZVaT6jWWrx/aTH0TI+i3jC/o6zR9mmkUIHnJn

uVkgGinMFLe/4Hg5PT6BMLWjpGWuRYRitnGwOFN5sDRl+Ml040COZulo/2jakVpoepoybOZLaZ2ehgHB

ZfrKdBpxMy31DKSnfHahtrqyjRcaDtHuZrMLq3LMQ8
Wlr8g990uyTu93+fXN9gI5/4UrnyZrbnLzErmaTrFeelmBxF9RBp9K4IMv8v+dP60f4YJDbMtKA25kxa

/NFWihs1n9Z6sSwarvWnbBhDo0T0z98PWO48iDOq/jOJ2LbjHOdlgbDitDNqGO5TIlXsFC9lzb2FIBYb

ODSgKqyJCeGvUQmPCfGkMPKuFVgnRY5JIQoiFBnuTG
CjX1VihfVptVPcGTUpWo9OWOHfIG1ENSJglQMvHWXoQrIoMTAZOxEsASJgTUAzpBJBt1uyFhTbXGX9PH

PeZutoEV8KOOAiQF6Er342EH94mlKsJUByUTGFGnQrUQDqX8HtzZUdLRbdS6RkP8SfJP2qqPFkDS7izU

DqP3HnF1YubpplJDVIPlArKCFkFLglPCUoDyWoNPqc
ZSA+Xk0WURL+Lq9CJY0qg3PnSDruJtFtBB8cfd3DKMLqTHj1NJI1JSJtXah1FAI7AUIuHPGfDZO6YYK5

LhA7UVrgTkQ8CVP9WIGnXsDiPiOpZNA0MYI6XMKoOvh9CHVqXoCjStplEdb1EIF5PbI8FMFjDYB1YUN0

ZkS9TLCjKCK1XBF6UtY9GCSqTCW1BCZ2CdKePwcwQg
f0KTE3GSIDOjUeYVc4TPQ6ETF8XAPhWYNvSJX8BtwaNeY0FBloKjG6EyBkDqV9ELZwGFR6CthiFoTmCR

Y4CWK3LIEpYhR2RDU8NiX8FtMxQzYkXDk8POW6SfvzXmh1FSgsUpF7VcsaGfSkJCljKyV0BqnaWQA4ES

B0LmI7RrzlEaMfTS1WEYXqPjgyMkv2MRO6XVY7Cwmg
BZJ0QEMuApZ0NIDtBQA4LXJtFLK0CIIqWTP8AUN5TKX0HGEpCRG9LWAoSlFiCnDuPNWvPUIpUrU5UhQm

HFD0ENQ6MyC2IQUlUWE5XWHzSrO3ALLiVke3MIE3PdC3NJUiZcFcMUUzSIEJSuEgRMGnECDpJFDsEfUx

QiEyBALnGFY3TIOxEdMtLDq1FZI6LSXeKnLfXPf4RS
L8NUJcUbKpNAd6SQW5IUUuKfGuAZv0VCN8FWXoFpEaYJl3AXA7WRPyJmFyRAu9QGN1HCNqDpZnVTz9DZ

R9DCGwXbDsBTl8LGO9KBTaGUncPDe2FOH6UMBsAoWqYJr4KFL1NKMuAeAcGOj3BAY3BLXhYwYyMPV8YI

RlMnFyVDQ8GGZ4PtR9WAYqQHN4OWD7ARX5TIOvQlLk
JRkgGxHlQbJdZAE2BBF9AAXhZlIaRaV4LDB3QaA9RTYtFAO9WLGfRiWtLjHfPqBiQY1DGUP8WtUeYVG7

NHAiXsAlDeDeDjEqKII7JOT0DIQoQCF3CVafIHK9OrDbUwYkEBA8MtC0FarbXqD5VMM5OkL3VcbbDcB2

XSZbDVErTtIgRKS0NhD1GzzyDmU1PDO7XiPlGvkuCz
a8GHK9QBUmXpesCoHbYOjoMfQ1XlauXJrsJGt9VSS6WeioAui5HBi7JRQ8PMOuEhh6DYsuHfX7OwTcZh

ZrPBvaQbV1FpbsRmF3GJCqVIC2FYTeTMK8IFA3XdU8REUeTIY2BBP8EoL7OSykSXIlJRA1VjS6KDGzDH

J5QEL0WeOwVcxcLdt1EGF8JYSzCthaZRR5YU5WHXQ1
XLYhLQW0NAB3ZeY7UZZqBTL9SKL9OmQ8FKqvEdOeTYT0GzM3JIMdPII7ZBB7UqD5ILYxAQC7GKPfHMXc

QI3FJT6ov8FvQWelXaFoRG8jjy9GRDO1GC1KQJIoFC9ZbPHkD9DguqKUVMImdsufkD9pTNzfDGEqT2Le

lfGTLZ4nZ2QzhLOkYWriIFJtY2UmF5GrnTX5TCAqT9
XfRUAxP2l5ATruDP1TYJWvCT52MU3qUNDOQnTfTYKyVxlvG9QvZzLKAeQvWJTbAp3fnNHXp1suSkTyWE

ReDnDeLVR1XQgyLQ8TOaOeUWNqTSUfvWlwCG5viBPtPX8VaEUaGpMqACnoJF0+PBakdfGeUzdSEzC8LD

Ram1ZcTDucNMk6LHkjKTEnB2A1sSOoVv1diX3UdVT2
mVFvY4CalGTFbADhT5Pdr2ZRf934Z6ZjcQIjF1GvP81reT2rV7dkcjQxk3yHpeUdVWnaNd9CUKTsDF6W

bUIwfITzIKWjCpQzKHHsrGEkXJCnAbM2ALieYYKnR1hvXQGjkfUeMbBgXAXZIdQdIINbYm8bbIQgt1Yy

cQT7q2FcLHasQQJBRZpiCL7+UQbmerPnNdyYJsE7MN
Chv4TtZCmcCDskNpDaRCp2VENsFcuePzZlTGW0ECF5EMSuPDY2ULa5MCE7GyFmYnT2FBUbNvNkCnFhGr

b9UVW6MEAeMvrrIzMfDWQ8BFXgXljmMXO0LGK0BpG8ARTcHTE4YVH6GnV9QKTgREJ3NDE4XzE8YFXwPZ

T8YFGzWxYpNwVbISt0OO9MVLS2JTTbXNg3TJGyCUD9
DyVmIvMvZKxiWhO1QpDpRdJuWDV5KfS2WGWtAsn3UZgmZlEeLzehGPD0WQjcKxY1YGCjLOAmYWdmSqL9

XuqoWdM4EWv5ULV8StVjEcT7IGYjBRR7UqJrEiJ7ECz1LFV4QuueLmD7FRTpENNNWkReXrJuVMN5YWTj

LkEgEBd6TVX2LmGiFzMgLAB3JKJjWOV2JAZcDlVeRG
E3GuGgAnVuZpZjZDOpAKMgUbabMgv0UUP4UiZqKlwjWRu3OJZlHXU7UJQnIsIhJNMcZGNnXIpqECT9LD

GjCjS1SBHnCPL4MBe3FTE4TSTfWEwdFMG7VlP6NMKzAse8BNX5KLLrPOuyDYy4KLr1RNE5WMNeOmKfFX

d1YVG8ESKcUtXtGXx3NBJ2NQGeYzWtRMq9VNS9RDYq
YbTqFJn6FCH6FDQwBmGkNWk8ELW0IFVjTcCiVXa1KVM5WPAoGxIrFWw5WTZ8QNMyFuAiIR9ULWO9ZHRf

YhTlQLp2KVM5RXBiNcHuVRg5URH3WDInWeKnOWx9SICgYanmMnJfLVM2UlJ8NUNzQVG7TWE0NsVaXWZl

HLQ0IFScOiG0RygeGnhlPJI9CxO4ILStDzSqUOwdNj
U6ZCBnNIXbLLL4MWMuDtNbUeWkDMPgBeQIPhAxENe4XNX2GxDoUgGfZvKtYKXcWpXhNnRmIGH4FEmzLM

W3UgWdQKE6OELtJFH4GpHxReBdRYrxDeM5FoKcUlTkEVocPnQqOERpKTrsJmX4OagjAgQ4TZH3ZsG7Ct

adWgk6MCF8QXMvVrbaIkd8BYciVmE0ShPdFhv2OO1L
ELJ7FpqnUvb4TFd8YAR5CzlnONy3GIr0HPV6JxFqVtYoQUosEgR1KyNbBeA7VAE0HhJ3LEDlHFC0FTP2

EyX0UTWkWZL7USU1JyC2ALPiGEj4HJR7BmS0RGJaJVB4HEB6RuY9UGQjQlu1AGI2FHUeLzjgCxe9XREd

CGMTMbSiIfAxHESoWJO9UJThYjSvTCVrYWG5ZPKvFJ
T5WLEkNEP3WAFgWtCaNAUzFZW9ZDVlYAH4GAVlMGQ9ULXaWPWXAaHjSJ0aro0UEfWrTCFaLqbHPfXqVE

oGJqGeXETxVEdiQG2Wv460NNZgU3WjwHFdud6TASHeUI3Ym946JuDvAM7GltjwoEaOa5xiMVrqIJLdV9

AqG0PnyDY8UJElI0MbMNMfC2m6AUehBS4PBPNwGH66
SO4xCODVBnBkCZKtCkmnB7HfRlHUDyWaVAGbLw5jaFSEg0gnZvVvOAUqRaWbZZIrJWzhXO3JQmRmBCGb

OTJqdAirUZ5afQYzUX0NkIEfCiRcOOajUX9+JMgbvhGaCliPAyZdBPHfg6BkHEsvVNz3NWnwIEChI2A8

fGKrCj6nfK9VvUZ0dLEpM4ZxvJRKmDPwR2Hnp1LPy1
54J4BgfPTzPUKjhEWfXA7yr3FermdmS8laNT9pyOJuA83ckC6jMStmTUGxA2ShusI3O8twfqCwVJ4WLT

L1R0pqpcKoIFINTuEbJCItT2amdHzqDKZ8NWVrVb4WYAQvJL5Iq409QNZlH8AhbNTrthUjIEUtTDNMYv

SpNn0VZaFyGI2zkw1NQuZdNOYyGkrBJdFaCNthQeov
bPNnXN6DjAB7MZCoL02eIXVsHDBtK5IfGRPzAoWrWS3WPH8whXlaEKX9IUiyOj8AGdBls1XuLZIdPUaJ

yp39XOtNPps5fpfIq3BHESwpl+5AQFlDZlWsVVghCQSOxGPRytAQUPWHZPiKn7cW7ZMLNROOBTAkJfJX

Zt4n2nKYJQ3chSew1rBnNjjsPuwmvd+my93iLmZ3F/
/zP///fC9797oeNVN2lUc6xHPBSQJfCOX9ebA6hAucAe5/Slcvo6JIrkZqFtBMR6k/QbzSeuOb0ltssr

qRSIDfP/Qyuj23cb38OZ5yWHlWwEbPk1tryq3LZM64pkxdl4+4mdxU8095jdk4ccC/xrq6pPFn/v7yTU

UcitwHPhBJG4OYD+2lDHIzYxDScq3XXN1YgdCqT7i6
T597+dp566ypAFJKLu8NDuFp4cPEgI+JJjTWfxVItz4xcdCuCgvCbtf/oLzQv6K7zDZ+L7K91h6h+49b

Gl1zG94f3NyJDicSMN9X5iBLAdt6gRFA76LDEb//uPvIeFe6XOQv/eQhSbGUSSVE+o0dj7AZMYNPZeCc

SB7SwrbmOtymiKnP77XVp3C7P/qT7qn1AIDJjfIy1R
naTS+QL5q0cSdNCitUKuUcKEU09v33yFnffNUjWMVfT+D6jeBrR4w/yHFpXTuo6UKgPE6RlfK56YzGlK

0j6DFmJv9kEK3aIxUMFYuf4yY3ER0SkHg1JPhrTnTYvW0a2T19Sj/CQu1J7gO3ywMdcs2M91/Tf2b7dV

dM8Tyg2MGns5AxRYXrrwE8wI5vyywygdIwu50BVBz2
EUTVuIQtOWmdHzJoLtnWmyiFc0Wky8LY3c/RtHWMIXslkV0jscuaS28P4jdqWMSRLTcM1xsqel823sJh

1I1O7JckNHDreUIqQVZeLkadmGlb0T92oD3tfgzPIVJyYwfYE/6HQ7UnCgKoo3XSfobDNVNtr82zRiMy

keFc13n3U/H0YCozsqzJve5ELU6HD1aodgZG9HWWYf
fwzFnaL8j0nw89SZzQJcj7i6iC4qt2iu3BTByebiT6vO1O69yaMDuELcWbaoRL6A33jF4SXoXwgfYntc

PeEp/WYMuZ8s4/8u3Oo9Cq0NcCH10Yz3nitrhrMNvcArqWp3N5kCQoUJnVVsTDzF06TysFxOVHXU0Av4

zwr2Ld7L4ZL/IYcbIqOpLbyjW223q9na71D5ttBiMZ
C96U6TeLIrmTCtnP6+Z4UM0tOTTRTJRNU0AAwlmM38OjidkvTuidpOfM9kz7XWvgzdtvqwVUMXaoVbnj

vyj9FlKC7sj9p0wYWxF3jyyA+LBXDbziYJk5iu0uhORBKx3V2wE4Kf4cIFa/uFgy9lkgfjPVgnPBzRfr

jDFpc33uKt2mC/21NYsHY8QT6xM9OYU5clocnzkbmL
yCDjEw8SjxCuyQWOgwCy/mEn6MrHZ5NrQNJHOZv8Wll5peYqPMCNvnTMeuUaC/QJibbr2NteUpG4gnBY

0sf9dwSMtqX+T8aRV4L82tb4d15B+wD4mybFhqJyuxmPnIBl5rr43oeeDF5byuk0CGsm+0n/E87Gnoj2

fV0/UMfbA+WZ+kh2HA9lqGYzpavqufcTtwEQUNpb/3
KZvF7BfzBjU8cVCRY1yjPL0llrkktvkmVegY4qt7d3maDIJ0fy88xugpC2J/C9JicETAZ6DepVV3u2wj

9fsMvJ7omzwNR/P19NBD1xJ7jfgctAHChaQxbp48lnXObRIPd72rnkCekowiFI4DaDQesAkoFfHyXoHF

nmlR77PqtL/Sy9dJtuK/TO/p0aKV+Eo+ro1GL/idPs
ButqGqHGnvd0ryKD/UDRptx86N4NjgrIaiyONyX/vAz3dOS8GEfhza9IMmXy4RW3RzQ4H/QbK02GC12A

B79fA6LJWoc7Sc5sjBZ/qaKN2HPklnXLcO2K25kJAGL4eSlfFBov01ED5V3+aPVd3yiI4S+eJmjT1Fqu

KOK6xfUg5dIQRZmEacOtYwj7uTFFFjLW1/Qa25y5ec
t1TjIQ8nTQmeN5k8FutLZtpMxEKy7qpAffoOhvcY50ftGZ83sEaY+VRahJY7iIxcy+xdd8aE2sC3ouTr

WP59UiW15xX0xRU5tJU+YQwuvVl62ui6C25Rk+vjtGqXGpZbYcItmZzOqIkid2nI55rQgveR6rWhn0Ia

+wLfp+EZYDxHVfrbVEgDrRXWYfTuDtCwa+upJfKM3E
Z/PnuDmU8Pa81syOtihbh57r1klKE56WNRQB9jIlFkqx6iTIiGPpMrTJVb8M4bnWbspGBNx0C8Y/VwB2

ohiNqaD/5pUU2PP4C/YQTtTO4ZCrBGabDK2UKr8XTF3rV65OkkxuYxq46bIt5Yb8ey6IimlR87Hl02P9

O1Wnczjm2aqXxJWuutCRJjn+fXXFeydo739lPnlB2G
ZzX0HonZajRvuKDVTkmSZohYXgo20a/BED0YmeYV4gsYdeB6Qiv64nXJAiQPImPlVjxOW3GiHqqZ3rvX

WRMSW3SQFRWdzxwx9VPvL1BQiNX1WbI649ChhRehB3YmxNbwtPyP3E17efKmcCOl5lLngSk42Ahym6D8

n2JG0mwn1/kqzVaCfgRANPtN6l2USmRZpDnBDGcvnd
L+kmZVFOilc8PBUMBl6JCXclsg1dHcMncr4SV7hJsMBD0odtHmqyDUHqHeBPle5dzg/ya/lyScF9glOu

t6aNAw+KLkSXQFihsK52/dK1KhIJX4nikU7LJzW5CZFU0E8mx+H+C3tbcSPfWwXFIONDcMRtA9L7lAw/

hsduFafjNZ4CPbRqVz28czYj5U68qLxkc3Kzddmpzz
3c1ahbAG2nGaWQqEf4gbGAfgT224lpb62a8lRcuhU7HedxMtrjBo6nUsjaGcSsbS1yhJ69+Gr1Z+stWf

4Vs/fJf1x1NZhVPblvA3oN+PNjxCiQUN56ducCCYtB9CvhVBkUmrXGf13F9uStSp0wLKmdAON49jj3Mp

5lXu4WxidDDEgo/DAPLJA/IVRV+YXyv6hpgD5L0kcQ
V2e7nb6ludXOTPD5BBqaaRDwDffE5zsYbtvrI3y5zGgm63B6CPlMhgYcPDJWgsup8VsD5QpJbY+2Fb+m

DFab4hN4QDV3HIyaM9Ybypcp/pfS+CC4+H/A+ln5P0eiJa4gS44ikn2yd8aYqt8wd63qvEwW/9D93Jvs

/qAQNb6jd5iTvs4PJ62Rx61ZpS735v599Uy7Zs1M9P
U22PYR8CYvbXiHFr1SwN7hPeyRfb/qhCcOPyb6ZGFNi4HzhZid3srynj6MCdj7mUcoHjWoDYD0QySkag

ZYYAnJxiSyPb0kfW0KUmIRKjEc0+tRGMzN8WMhWjL1TiqmfSG8CaAHIc8SaRjfSznW1LgYOd7qHndCHF

ZMJ1oGUwqVYX8WhZ0SXhcN2JBPWFwhma7pCLzZV53n
ZOA21/543GohB88eGwSfXVJxyT6IWJw8GL2jANbkcaXd+RgOHlE7HrrTOH0Q6u+S6mKVIxZzTKlOnwQM

wHTCXOcUDSHEfELUowFjrTEXwUoMu9+LYpaUrHu80dG5PmLkgfl8WTrRYdNSnghgrHFXqapdwi7u0RJ4

ps/57VH09pLzXIdtMe1p6G1GFCrZxSoH2I0QJ8Qvxt
vkHx4r+ojY6eJ/wPqqvL8C6Ahl+MTkzQP1PAiSu0piEsYCXK3MS69j4KPBmDqf5MKSq4p6yPMwp/S3nO

97a/Lgj3esBX8rnz61gTbs++wWfnUclh2kkRnD5SsaTwu++p/Te5WkJrueYnTTTbf3OXEU+fPJJxvC86

Ahmet+OEpeiOmF6pHau7ahhH7FqIj3+emkRSbju7U06F
Jfze0uTnfm4HGzP0wwDvMfUMUUlMjVnqVlO4Dw0VXUNtgMn4Lq3wZwwJYhg7n305ru1yeogl3nn5bxar

WsiH8WfgF0KclxPAuNGi3JCU6qib3gjDHl6w0MVcPKwLsBztUCIQDSYIvLo0BGqHS+0HeFgVoRPWZiyA

czh4EuOSKTmy27ycav1EbuG07dNbRs2AjGEvgipvkD
I5SSwiImjbTI4uMFyoE2eKKlGDPzgVpXzZVNcQIrjZOwDpCsAWZfnZV68aEP8Od8dubzBkhifRqWDbBZ

Ucg6bW1KFuNMgm0ALIcv3J+ptFsR9RZ7dGhJ82sEVC/TAbds61XvwDElc0UtuPtWG6fnU26DR6xS2gGo

D0Gl00Uu87abM3rppUX7+WhhHO0BMTNdrlVzHUDhQ+
elrrlEg4pdwg2yxgDqBzQV7uppgjY/RTstdBzeHFYvDso2R4FiZ9uVMuuRKOmcLVTbSG8frqLKtIgAp9

wYoMsE01UaTcLt6qELzt6T8WjK5+QIeDJ+4JhiouaU0CpdIYyqnAzWuHQY8cFgBQFxOoFWvbxL/P7qqS

Sg8I8c3kUING6rQdXrqSCaILm3ZNXqj+950IaVaoq4
ZyI7luzqaYihfsLdmwkBD7ehDbXgb5Qf9VWO0ItcsYB0VG0x+ayCnoyTQbAwWcc5G2Sprg8ZEwAdmftk

ODILt1IHmGp40VRcL9RL7Mkk7SrZQUo9nBO5hUU0YrtzQkdy8zfVCAy9QsbDZzaEUMVhLWpVYIA3dqhv

CnO6NyLSr3spJLsYVfcCNxB6x4ncIMHigOorqcKicy
+WNhi4M2Npxwc8WYBsSzo3/gxyLA0KqMko1IOfgetJVjUVfRMtvGFuEmp3GXyx4UaZ8JRLXoXGn0Z48Y

FdEdiXAzWtnpHD+nEXZAANzRs7g0ohDb+CSvncVhFsI+PGKVjTosEiAwUCiJuCi265wWsj2ZwnDteL6Y

GFPXaOhTd7UiskkU9bkwbS0+S1Rzjl7weYOtjwQF2P
1H+5ryBgl3coyqNQHbNxLWOPRyLZJq5og6VgpCeliVpXtwkDk9xZ4hRFXtv4hmPTor3BpsWEsCRnDYQo

9rv+p/qPjP2m/4hT/Wkg3LE/1zF4D2zz2IWldRVi4p6+we2TSBFZuv/kknsxsqnkyzGh1Bj3R6MfEpz2

wQIc41L32BT3uDy4guns9dLtkyQ7Q/ZWEemB9PE+hR
enar68awZI0974g/w+oXGOehkEdBvxEkS2RA5JnHLPYFDuWJNTJtaI67OE/0ti+4mrbkOCuypD5223rB

l6hht3EtrgWT+mS/KIchdCvczNyvV2RtKKfwUvEYldcPKJ7G+lc7X6Uq5JiCr8FqvFdA+B/gR4ZQtOQy

QAFZgPD58arXC6cYG4YO7mrR2aaOSLW7sC6qlaUArH
rIN7g/7YuQF71Jj7A5B12WiCvCuYfRbVy4v0hs0E/ZEZsCUwbk1niKaeQJqYNvMHBJkVSAHvIMKGgf23

tjtdglx9OgnzaUzX9vjMWAAKu2IfrKNbRDoIWldUg6zZaHfukByAFwOxp3R7bYzEChykXfMZUr45+BfB

pSS3sYKXXPSVTsJO2eUf8W2XEQi/YNi5bFxmbI+5/p
8wgkMI8kVzCXhXgwydUHU2tL6Mm9Wkr7UdTScKyoWy1WjtQEyvS5SziJtU0Bgm87PSDCWknY3+AC7Sy2

gk/WRsJibffF08wLWVVkOj/ZSv4o4w6xq84vdE5tsJpVyBhdQy21nnIthJojftrol/UfIk6wjH8EzaJk

qzo7U55S5AbBaUdRTuXReaccvD/dvJrhNQ1s+8w4z0
hcBgtEslcAXLg/Ztlz410sT2sj2dKOerPZaVr4MQh8Hp8CRaSrenLp0cs1kEL0AnInH5UWQZ7ivRA6kf

OTEjR6vqTWTV8nPE5FIaDfHZ0QtTPB0GAmVmHzNQ9Yopt7z/wE7NH1CKKAqod/Y8qRAvgY+Hb205kNmB

41oo2LKH7+Tlkl4SpeD8dhRY2d6uHwK/Z6l+/zd+T+
6X6UPeA/+JThJWjgbge5lEjq5y3mLdCdtAXnYxfEWkT0o+l6Q5hW7CfUHu5pR+Ye/hJP0ROJ8h6RL2FZ

1908GoveCgvYUXzYt5VG3B0yhSAU3likt/QblaR+smjHZQ6vl9LynztlLanS6mf4Wzc5osxbqre8ToyU

vqdGv7mIseUL3ihR+YvaPKupQ5A69eHPCIq+VC5T6L
XiLsuaWHZrOdut48ngU6aqPADaQt0SSp9KEvOndmzYI1GtuZjbqlqCejYQa6Q1Ws2iaW5todYYq2txAe

2SLwrfRzY7fnrWx72SdWYfGGrR4xb6gHiikTkBbHTLqje6FMV20B0LlwKTOo44JCCL4pynTQ+jDHhE5l

L7nU7a+RlHW+08h9tAwH59SPAz54L4kDhbKZRakRdp
PrZzU/GCbDS4n+Philip+f+8lN2Qwf/tnRD+dGdE/T8p1m1gg1moqg2wokuzx9ozxygZolfsN5LbkGKf/ko

uco2Mp0W8UYl+ajg17pkvfYYH78Qq88WCe6rmTOhKop1qukQ1hyPQJlEA0K3PmHH11VUJ/zkLdll131b

GdS+8qftSSt5EGwu8zj8zStvzr5nTd6GsoQ4D1nT24
KgYvdXZItHosYWx0oEdw0FUK+ZlydlaB4RFcODIprKX2lxlSALOiOE2DufvNavvKxfRI9lzzQmmnH9fn

4anMSBZk20QkdTFTdfxmqJgLK7HWMnLuy3yPnS41MYguefhdzAhtnHgtZVpc0VbCG9/B5Jrj0qTXb6o+

hQfpbfLONGtyvyr9ofB3TX+WLDccdKa63UobS3Xleo
l2Hk8P4UyZ70shT8mLEuyQESTk9VKYCD9eAeTJouBC3XEm3jOxCRkChAoy++lzmU4u4Y0TfIAC1z+J1k

yzF89lKtY5fXZvuuRCwgNXHcih2l9Xt1DuKh7/8sBP2z1r7qp5hNbGcMPdjdn7V9GZuL9RngjJ3c1InB

kmebwp7J3Ixi+fukoyX0r6vs2YzjhaQUcbVIRggsVi
G2SMWClsaRcwc1bIC1nA4Z5Nal9EUEwxOxVDCgcNyo64KZtnuSROD7N5no3oQAYQOB8bP4ncb4EKkWts

gc2vdpcFN/0PGKs/9K6gCJ4S73X9vf0eFP/enDpoIbz/ByvLZP0VWsH9mHX+1TRR74/0y+hKfTJVaNXo

n54ZkP6xcH28oy5tR+2Q/ymy48A5JfE18WryH+D+Af
Mg+WSL05wfMJ5UmMMaxtHrhCEaemqVDn0Ks7Xwmo6yU6Blv3HY0KhcpzuqmWKZchU9bC/v0R1qmrF0E+

JN8tpvq+0SspsAcrXmhL9x4UoAYHr32x5OaOMKrVr9I8uYs9iG/as19Q3Gla2kDDOb7j1MbjXZEQuQVG

qiLuf1ZSJqrnjPEw4lUDRgDZFhiGv8y0GqQ3cvrE7N
CH3G71Ya16G2VjwjI27cOHT6eWw9hyZZGxeSi0H2GO+NgUI6EPXMbWx+IZl+Kqn2M1oSHGr2ki3Q2eJQ

0H1x3tvudJ2/smz/KtC+zYBuqn7/VpE4A9Sv2w9dinGm6v1N0tgaWiWtr2J/uVufbjsh/B4VHtF+6CvE

rR7OPdiAuEnP/QHtYZ5S8l/q+8PdNIChDwQ/EOn37q
HNSXOYlV47oc2/qAXFwB8fUlDLyiX42X4tCUYwsXdXDNpHMVwL2z7G8HDW38e3GHpiv5saQ49Ylo3gl+

6OaZrxou2C71Ehv7a77vY6N2U2UV9t+fAyYCvH8uz5OQDXM1KRhID6eFauaxk0mCJAWogk1cj/EzB29g

MvAy/0fr0VRVfcNTfU61ANMHt44yNtk4gyJaXb9DOj
CrXXfBqUtz0AELfzeP9QAdE4U2fmaE9WtkjN2cxKqceXQhEtAr/YlYkI2+0c7Cl6GmygY29U2RW5qUG7

/clNwEy4/w5gPj/1U7FvLMpT/icevzZ6VGk+5EI6raB6jh7k/PtNwLIiTSnU2glrJiCb/GwD8kFwwNY/

4/Or405aQwOkHWRbYa7lKg7iyu4ya9mpbq6HnIZI4/
7X1uR3gxar5pBQZA/O6rcXv59+ce7tNvMeHVa3ZccWkKxYzNxbGmo6g4LnNivXn6wZAwlZKFaIbbWmm7

w7s/0K+gCxKhSAEJT4lhjxTkJNrU2V46B1VCMQunal3Evy55bFsLto0F+/uckhLPa85PV82vB4AYSKVJ

a17bH621DkpM022BnvvBdc1tI7yEDv6h/a/3bxfA9o
Bu3tJZuE1ps18kwSNZvjFBst8mWy/S/P1heDg1Ld5G/H162cNdJPBGxAA6O8DwAj3lXfaWc9Vvt0w0Cc

aXf8an+REbC1G5DBkYDtq+3SEFghX5C9+7XgtYX+qYqx79SojkjMcjgVslzHKws2PBbiHh2G/VC4paTD

Ocq6G/0F2h5oo/sUZcG/DJQ8oZ4EfqcD0AFqjgf5dM
Pw/wb+GS7T1z85TMBUw48uc8idA/kHtdt17N4rN6HX/VI7qRfF7pO9pkVI8yUNUVeYazfM48f0UHFoQo

ADk1ZUgTJki6DtS18cmiwVoI8rn9t/IhKuZQo1iaesVE7vwAa50apk5bbg9CQo+Pf0b3j0Mh0z0znTSN

jcfXtVDON08Zoq13Pbl4xCXu/oc1j2ny9jqhm10qSK
mA+ouB3XcbAUtvAmczwgd9J2yPwL5+oht7S1ktt/hxmorckNwa2D8B5dT5GiZ9PbZPGil1AW3w2G3pw0

q1nK++7aSNrlnG+LlegKzsaDkK00hAI8O2adldCqA8XKLJ09dugDfBz6jhZhyaa3D79p96/9PaOs0zPm

k588QNE7w1d135pEi+um8+FeVb1z8EJT+9fJ0DnP11
bONew4mkte3Pza0qpAuvv7Y0elXs/nR+4WJ0Dc4tXw+5m65sjR56SN8QHHA3oKkqcoyU9Afh9Oia1MgF

uhuN11vNc6qWdRqnVie2q+BNURTW2gypiYuZn8e2QRc8/gysMG3YhHmD9J7T8DcF9El7UBASfG+NdhjF

6JsYI+qN+jceZlmVOx0eEQ8wn42CpdCDMIfhvDv5rS
mhyA6yYdntcOIUXo2lJuYnFTeTE4gpgoHzBPar8IN/EYKH9weZ2+N9sVM9e06e42OSNwM5gTngy77p7N

lWBIuenvix8B4+g1Ma7V2Va9gI9Vv6ouYHW7wnmjuSfx9JtbuKvyU/GO23Kx0VsgVZArnjVcwtY452Pj

PSKdDP18IDfYMlvnFu9A+Yafz2IZI26u22JkqVb8T8
1rXjgMWrEnu4jAYZhsYKCic5HGo55kaaMsT36SzKPCGE+YWYxIe0XReH/SerLqNBse3uYNxLNKUbxQFW

zfh9FXXIrjLzZq5XzwBOBrnLZFOfDQ5yOfuHaNPyHIjyGM8VvseAC+ffUOrg3z7DwuKE9/YBz62v3HMl

TKy7PeMMWJH/gVcpyvnttFAuHtfkaOkZFA+MhIOaCf
wM7paitj9ozO/+TM95+AZweQ93b9Hc2dJ0Go/38ytk1BbJ235pLJC8bMObToYL8AZlI2E2O4pZUaOHs2

gabYe+LjOfX6WPsigU9Uz547sr7flMmlwtxx/jGSuO8A+ZxQm5RVT6Sk1k0RBgLFVj+4kBa7SLbcr03p

/roU+n8GlzUiLFofGkm6mKIzVjxFVDce0HPN70w+7M
cJ9dJGbuO4Y9h4pmIW8LJ+ItzfjMldwSkYHhrjQ0CnyZGCeDFXcOY01FTosPIhZk7Sb5JSZdRURqWxxr

xXpfzHSF1mQaioZxN1DnmK2CtVuIJwfRXoB0mwA5g10ejNaEShf3MOZttE9lt50amDicCWCS0ZjmDxNi

jDNPtxzzw3BHR0PlDzdGcJ3yjphQeOBCBnXE5S+O/E
kJ0wXZXjrVg9z1x6cAQbBpgsdJ613mNvF/jDtqezJ0F3HSjREVj38Bsvhps5L1jP+fW/DPfMTmn5g0Cr

1Fkvt+g5n7mRpTL4H/b+iup/jB8FxKLlQ+JSWyMnsc7RzRawTVH1FABdmIHfPu+DwlR9Oujlac+lDp4o

5F7QzYesvghf2+J6SCvzXfiesaoU8555/ZurHDC4we
W0z4S8i4Iak2E1V/d2zy4OaE54iZ4c7XN3ad2o6VdVZngYSh+mcTO6HuQQiqYzeAizbveioU9cwV0GvI

fP+ue2mWl1Cg4t3MY5Ld6UfR2z2pMbjRYUJbdbs72h1ZRr3SUYq9S+UyXUDOBtwSP3WUxQo31W1jjrAG

sCZK6p197sFfg4CiLaKkYdxJRZvPmvgcs9eGLozBRn
i0cpgONJnBcEzujLhWMdCvIQdXCXt+op8zhAt+vrMD83z0EFerIhU/7S7U6bvU/pSkzbVwLQw6Wzv0HO

zqKnz+65/+Q3SH/nWmjBPc4LxR1y/vd2K8a652aw5eAL0R1X2hmC201ClVc52t/Aqwx4ZPahT8/LfY1t

vUgaeX/lfPdZfsaOtceZS8+89+RYYi6jCp2xanLauj
/mNLUsx9/0l+0yWAapPW66f/0De0/uNDXPWj+MH8TylIm2ZThmz2S3N3/N+RnU3+B9UwwXnUfB3ThH57

KJmYThueJMOHb+xMAinJAB8dhH5m2AoCuNNba+R4429KQCDHiZyM9BMZw/QPq6wz5h+QEYPrqxoRx07E

D2/88AdUAezKTeOEVNngt/QqDbOU2wFo0qTJmAt82z
enTrBe/9G9f3sc6QfpyQhx0jD/677fKfeu+jat7ckw34e/WoAfZlttM1WX2mI38z0NJTCEpt4+eAzcB+

B/cwMFaS+R2XIi83bYi2c2rw1ve+oXSoK8zzh+2whNTvV4+GIE961s9ETedE/Ata9v9V761jP0Jtn4s1

BcA2LWPu2D54eT+9nOO9zVPH7aa9IpGrg/Nu+vM0/U
kuvfs327TJDqcDHiryAhCnO48w/to8IychdSZSqLFzYQOt9izka6Q5tz2loWEiMQopMaHggjSgQCo6US

JLAY6lfmz7OihjV8szZ2rGm5iG9f7yE/MfRueQ6C7OegC7fO0Y5qUfBPC1GoWrD/b9PFdTb9sF2F7Vzx

U8ljD837Q62+EDUBtNUC+chV3z08jd7/A2GbBoxwF8
eglzAPO/pNK76/xGzw8mGkR0i+XCo45Te+70IA++18Z7fMAUjSMS/BDFwVnLb0CFnt3i5M/xRhu7hlfX

M9iNWw6m9ign03Nu/YetZmiNmVHVZlTRjN02P+En0U9+BD+g6fC/NzjrpW8f4GhBD9eyyDfyu2jk1LHm

yM/mV1mE2NRsCHzryesSrqVi+GERDwkeFu2phNsMW8
x0u+31O+8rML+Nz7ihTaI5xqZ40JdTkp5JqDWwIopgHJ6b/ptBenI8sPB6SGveaHIIdL67MZF5tYKm59

nP3kRmn8PVHm/XWWZd5vLfvx6mSqsXJqumaQr9KeWsvxgs30gHjBL8gUY+T6zuEh+0Hgs76n4TEivGpv

KTga6K27+4VG4m+/dPRWo+yp8efzIUhxtjMKyn4mKs
2McRKp0ZUA8Mqnh4iudj73AdQFZ5aMcudxk6BHmOlZB3deyUlPx7TmuqE+c2LvvofJgcO23dpNxui/IN

LtsKcpUaxxyHdDw3J5ytTay6w+8FeM/fudUwy2KY/8xjYuPrIQc678KS7x2h3B0AOu2wmdVPaAuntiiw

QvdcaihbTl0JZ8QaFZ/alw5my3CC/ebTl6KdP2KwZT
gml3Z555JraBW864TCDlMbNVegwT/rQrtjhj2K2xdofgZw4kZO3nronVAH7bmTeN/Ob5Brk9lA6lrSBm

7vGjj/Yc7+rfrdRP0+7QVUxLqWdaqaM/kmD0Pn2OprwVetZkadv7X6oOlfZSqX8M2yChAXCsD9UEyKs0

QZiDaSa1zTchXs8WP4vihccdJBunFanuU/2vqqtZZk
6y/1yn8Q2ZjspgWA3QAr8rB8Qw0ifG55aRgnrdG4pYGVxZ0Kz/o9ir1+iuExyL+EpqyjewzZvLI3MOo8

KJv6gZO4oz1si66wXOb7L6Kbx9OS4/oQa3mGrcy49K8A93ZfcHfb4IEi5+03i8RVM5iq+yJyXcDnOdy2

1okh8muuguWTNzqk7jvwj1Rbi1Tg4g9SHPXVwqDdJy
lkE14qgqDzs03Yy/Tt38UqQzYko12UGwZzkq2Fja9jtqy+E7x84cIQrzNvkx7Za0oQAQGQkGGnwHXYZL

8CrwN/ZL2ExEXkG305h280zCNHkh01qM0Q4Y7d6wf0lBGI5qMYwD984VKqou1gkZAL/33PrZ832WML55

rQp2O7Px1aMdn6QqAHwsEIm4ln064ZSgayc7PnK/l0
Lxok0z0Nybd0aes9WnGng948RgKXE5D3tO53INsL8UuVf0io6ddkE2AK4ZAIDO5HJ91/0mNRw+gx0Ac0

Dq5AKzkoH/SrKZ/ByqUufhlgnOfssKFbs5vhfCtyBCyo5HRkEftDJS9qTyw9fQb6OxfE8SAwa4E/BK0W

F5LwcFOmWC4ii3j7eEe/F1o4fJ622VnQtl98SAm+P9
Dhbes9xNTuhCud0j8IewWB8M+heAUGi4g64aIE8KxHyCzA6aUbqZeq0hgFPaaeW+mfkPC17YavCLkSXU

b8srGdGIBwjkIACOKwRuhpvrE1rv7KXKE+EcZ2iIlWkYBj8Dm5o68VgtWZ87km0DX28XHuIeUdfv3tBk

O9EiP5j3Z38fUz92CclIHuH5ALSv/MttoS2Ny1Cq7X
lcpoV3VzaXyMfZ/4xwRW1o7ciTnmfESFvCCoeJmoWfY74pM3gyevbfsb77O49cNzonJDvkr5I4NGOmqm

+bAqYznajQ1XjquU3L8dz5QEN/BMpYAqm/EiQydk5u4OUeSe8elF+xWRzTpyGlg7EoapgTd16Rt+LDvy

ACqZozKPEJ5zHovfqTxerJ5Q4DAsI8qCitcq4IRcIi
0H2Owge7cnp9fMTnsx/DauwSTorEkoU5pMbdGpFf6egzN/CVaeON68LNULXjoYFQHsUo+G+1l/ACPlDN

VMozbQQQ4au3t/NQTMY4+7+pywYEEjMRxHCqf0ZlzN85An+tr5a1wk7d/qMOyVPBnWCGxzgWNISNu6xj

ryTafOQBtgKtANSAZaO+vviCY8xb/QofEqym/amOcA
h6aBHqRj3ntbudU2pyx/TOc97dVchbDHJkdKp0Dr2hHJb6UHs++uy/l0gOqwBg/GQISHSQQy9V7ingUZ

g6wj+irrM/5d4XfzFqzUoztR1tY7UxSolE7oM61v4DnYdUiFDyzKsleB5/3/7ssmb4CRtgsyc3e8Ar/O

QT154D1Cgmq8Gr/3Y8Mu5qC7ePvpaLQNaTTXe9QAur
SGGp7aj0yjeInHVpI9d2GoprmnFlexWLfqQ7+F/WXK2QL7Z93yYbm9Zf1CDuO1y2CsfEHdALU50W3JAy

tDXg++LqiiHs8VdBV0UseoE/EZPdutzm+b+TfMM8z+iBdHeAcA8tIrfyEoikD/oq4ntfqQOsEE5zCAf3

ZwTZ7FXelkeYzKC+YUwa76o62J2Wu6woEDnbaqJHfc
24PljLzm46dBqGOL2Wnmb1z+Z3Cd/oy8YvuV0Q9YQ0s9M8zvAxaXs/WEukG09cqxG51z8BLdbELN4vtp

Q21s+4Yi4f8zWjlP4RP4fip3XJNQ0vpzP0FG8Y6W2UMqJWTd13UZRZB5890wcA17o1/9g3VJan+T+/sg

BRo89mVPPZ5Vq3K9aD8eWv6PzZfX91mi8xHQUMY83T
TaWle8OWpXBuhypOPCmOcVdjo4OsznYE0kZ8g10Z7+kAeGw1qYTgZ/65Ez9RfcGzMTp/1k/LFujOjhGS

GvbtXrb81b896gtIIanPJeZ4d1w/cpIj/B/DYJ83kFKKfXVFFV/359+/Dd5oHLEqqiw1R64cF6s4DTHf

9G19wX7rf4iY8prtTnPJl5qucmcdahs2uEqc8cX1pW
1vyPU5dsjT70ayfHtco2TARyC1UF0tG+yIENSClWWA18AZC/Jk/IX6rY/BsxBnN7GNEvzZBC1ksKdH6/

9e/G5L3S4DwZ0Gx3vXEtaWW8F+IOTaskt0E6mUYwrohC5S7CBGO/IxW8yd5pFb69stalvmmkCnUDBKVh

1YmHodCmCj7pYntPiH2lMwXFmU6MmVVT5RSv9kwPZ/
H7jGTlvWRcEpqibMcWzGY5bVtuszHjmlIvv6DVQ0x7AbknvAUWIfM/eTNOcUEJHkFu9cY5wwc/DH4bus

2n9XpA4JxkSIfqZEEvAdByodIlrKxziMrwtyOzf5Wntn+acNqLzONyipeGxyZrVXkJM/nvmsmk3l+tH1

IeSerDt2II3JQ5La1/lJrwLc2MYInj/6AJycUlxSGx
GEX6+L63het2h/3US7feAJ9zSbNwRR+kGTraMexV6uzcWqtBkwW4gUOAdIvoMasyUQ3QQR6USZsVlE6M

FNIb52xI1KiaFwXab0HfD9ewo8uGKA3SRdDa6+fepRviq6dbfO2TTvIwVOBuMwDm8htzbMQ8L91+TOtN

ULE7wj1mEkGQgXA3bQjARP5tWIfgEvu9HBNtPpPn+p
bQAwJf/fXMkClOiJkW+y2xk/nPtVsKtGkFNXLQKgQfVcETVDoCksDK03GF29vdENExZ9ZZrwjn6kspPI

J0J5P9rrnxlEeCWjhctCKSM2ggq41jW84iKOJAQSoRiaftHzu+yZLy3En76DUJ6MMl3owH8KIkld1yFW

fR3wztoCDy8r0CF2WvGsbPYJFwYadukokrfg1Og8pH
ju9dH+x8Vj8ifwEf6yulsxLi1C7j82UKQ7z4pKsic0TfbjyX88XhOYFYtm+SAK2lS1qaO8J/WiVm3dZc

XzJ9YnqBAK+MM/67eqYdYD3aSw3I5+myYNzEqqroM+ZM9kvVzllINZ4EBxn0g9VOdIHYdt0tg91gQske

JVuum8GM4G25gDHkiTzziMhm7l1AxBP+GK2OQE3Cw9
6RHuEs9po3OLUJDETsuJ/SD6it4lZ7TgzC6bhllgl/KUqffto8cuXItP2aNJaSFNsYrjMEmJheznbTdS

uukULmlGaXVLdDXPEOP/Z2RpJfrtBa0xVOHqMufgP7ST/yldTUjHkGCGXrC30TdVN1sajpryPNGiq9Y2

tRDTYSOok5E9Qtp19dnh7CtXD2oYj3eCccmC3y/wCp
GGyOmrxzWJX1g0GwrMSjSER6fwEAtFu4njfvlCsuZf6hvCZJUK8YZEomKz2i1YSqBkTZEQap3JEREQCT

Sqqq/XuMZNyZ0yI6w3DD7tQlEGM/sepwYS4wUOY2XqZbdhagTRlp1zb1rJ8sifQJgwd2yXPBj8QH6BTy

Ed0rIyA+3OWLdOvRoVITUnHrmrTvOZapHVwkyNRx81
IWjVFxPj4FkX3Eo/hf/C/Og9r/h/DF/zU8pGhXLXzcjaHntVRkMA3ALoYqXH6bhy7YYtScJEFkLbhMHx

LmUYvjCorcrEHsDZ3CzXG7VLQtJ89sLYDjOJWiV0WcJMU6AC9+HSwcDLK5dgEcdN9BJSV8pp4SlGPUg+

+I5vNBfRFnCYxkFRDJoUo/sC9Adgw7kF8Cvldsv2+a
XbZNV+cS8puZ/0qBsAzrg9lUaM/sr0gy4YRrcXYk7NxBjfJQa0bivbHdEJUVhedcB/bCLWjWrafBY1+Z

7m42FPG2c/TI5eS7d0w7QgmM4ssU0CDZvvjkvgJceFlvu7CGMuEGNvi3rApzKsYRqZFpdxJ7DJ5Cg957

0Y7IMLeinXQqiOZnWglF2o2MxTR53a+Yc4Ab00rTcM
uh+RS60TnbtoVh9tDHeZ+qKda8ditW3c/psRES3Hi2HSd/HlwbvBzXWZfobD6/ZT+SyY8BXG5vi8KxBS

IxMRkubqBcNxoHMnG5IXPqj1OgDHx7RB9WHWNwMRpuSD5Mn161UKBkE2YstQBecr0NLIHhLh5keU8tpM

DgXLQHHVODN1FklBGuDXkeLX8Cw3IyjxBtZCQ2Q4Dd
vTxdpHwbvFS3ECZdHPWgD2NfvQMwNkQmNTasJI1EfAYlklIbCh4HQNNdQx6lmLFRm2ffKwYoTUEyMyXh

COAeJIspQZ1TDgSxS8m2BHbaJ0SoF2mhQGHlF2QcqSVkZX6DFNIjUw3hyILigAOtRUIxPMPfRp5YZp3U

PeOlNS6qjh2DKjDaPTLhXmvAHkd0QYpxXI7TmXHzV6
EtytZbN6BhhQuvLQ2KFSMHm645ERjiPWMrWpJzPFFyklFsWUSHWBGKH5MpoDOnN7NPYGJvH8qGJVMcU4

cyLZ77rPO6XRmyAQ2TERIWmDZ2DU3PqeAsUCh9V9KhY3dzhSJ3BGmDBY4zRPqrE9EtKJQvlbcuIUvlYC

01nBE8DSOpV4VnmFthbBOoxSWuKp3fFSrcJB3Yp111
XAMcJ9XdkPSwzbGrGZJpASCZMzKyE3XKLXZxROYnO2hxOLa7RTYrHCFtOFYrIaCuJNCiYzghZeQcFODw

CC5FMs9+KCmjjaEgTvhPWzA2QNOdl7TvJOq6EN2IPUVlNSvfQY9Vk330V3G1HrN5wYAqHHhlQUJwDkXe

QIMwqrBzSTGNSGRPB2VtdDYfX1BcC83gpI2cW5geVV
29oLA0XUwKOwPjP6Kdz6ErnlCannTCd743xoJtTgZyHGIPLO9SEOOtXO4Qnifhv7XdMQErAJLvFd5CEz

4RRuUwPY1lkk8JIiywSYBhDehMDluyKcVvAEe6SRFsXilpQpw1XRA8KCN4ZEBqYZK2IUy3KJM5FjptGA

yqWUPaJlIaSpJdQus6OWD8NMJaGqgyZgSoXDS3SWLg
BbvkOOE6KCO7MxX1RIKbQGU7RWU0WtG0NKYhMQK8BNF7QiB8OMZkKGR8HYI7EFDsRhjkNRu3CO9BWFo8

IWC9YGZ7COBqGVFgBMH4IzqdAnX4RHtfJxJ5RrGiYtI6LHGkYHV2TlvmPnXsUDY3SEV9FFDzWfW9JYB1

TeR1ZpXwArVoYWm3BLZ8ZywwNpv7QHreXmH7NcwsJm
HzVUbpJuQ4FzgwISO3TTN9NqF2HfsgNvAhHZ3YPne4SHY0YQEaLkwtTPG5SJM4DiWgDlNbRNR6TXS8Ux

Q0DFOjRMK1PWN7PeTvZuwcQAH1UOF8JrWmEoHiIjZaUYYfIRHeFzVcMVDbNRU5GtN9HXDiVTW3KUK5Ss

GuDpDqYDXbXDJ1YIA8WFBaJFOwIPwcQcJ3HHHoOXr0
TIObFBEbBNHmOYEkCyHyKrE3XKY4DXK4USPyWkNhGIl3FRW2PKVqGmCeUWe4ZWX2NFOpBqUgLAb6MLJ5

RDHpSzWuAUi8VXE3JAYsAnBdTLg4NRK3CKMjQyYoJLj2TCP2XIPrUiZzVQj5UIU1BRWnNuLzACr5GZOF

Bnw2LEY0JVXlKpZzLYx2WYK9ITAmZmRsLKf7WBE6OK
JvIaEcRHR4WEGfTkMjFCE7BGM4IhS0GGNyXDU2TJQ8MQW1HFSsWcElADdgAxZoLnLpYZX6TPP0CJKdYo

PoAqM7ROS2XeW3PPAqXBK7VQAhAdKmSuTnUfAhVI6QIKp1EFRpUnUvPrBoIhMwLJDqAtYdGqVvAMO1BO

qvPRI5BlAmCNM5MHXgSFA3CwhpSiT9IVE0HnP5Toub
OaA5JMB8FbRbALPkFNxqPzF0YkpgFaW8FBI0RdI4WnclQlk3GLU6MWRwNurlHhp0SBelNrQ8DsKxMpe0

QZ2TTst7HDn0TVY7AwexRbw3GBC0AKS5YfbpQfAbFMngYcC9LtMbCiHiKFI0YpU0SafiXyOdJJU0GiP0

LCRqSBM7DEX6MfV6CNQjTAX8NPk2NXO8HGDnGSO5RZ
X8QmC5TOIjWGX5GPV6VUFwElnnJgd6FYR2VKO4NBAaCKs5FUTpIZB8GZC7QpG1FTYdZWR8YKP0FfB4QV

aeKoEsYTM3MoM3VIUzHFD8XIU2BoU5DVOfKWF7PMEiLZRrJH9KEW5in1YcJThcBNClKH7jsw8MCIsBRy

UrE9K9lJYjSl7knMWry9IdoRV5y6XUNlAlH3RbziIT
XB6jI3AohVCmDAb2UClkDv1CJXZoNFJcPF74ZFvbVV1GBUTYTPkacZDaCZM0M8Hme2OhzeZvHBHhSl1V

KQBkQljhW6FgTDXYRvAxG4WnrnXRFh36LEazFB4yOJUjHXMgOWW5AHFcASioLO1XfOOhbVNDvytyRIIn

H4T5IB1CHMDFOi8+ANceuhVnQggUAeZ9LXGfv2OoPP
b1UM7RYQOxVVmkPB3Em548K7D4XyT6gUGzLXJ0PVS8tFCvMoDeVIQxvhUqCTRpYXyjUHWyiKhdZ3DsL0

4qdQ8jB0kxknNap3lErtNqPWfeBk2KYOYjVlzpt8QReREuTMQqO4tss0LAnMCjQVX8PM2GLYJqZ2sgmE

xaWGO5GQMfTf9IJZGzAt6ilNMzw0YeyAB8n0IyEgnc
MCBSDQo+Wv7IWT5yc9JvJKvfEFIrOI6pbh8IQ12AOwJeUK9uro9VCsExIT4nay4NTPlRFdOwH2Wak6OL

QTGrUp8AVXOdXMV6pT2QfAHvPKSzHH4tN2EWGR0DBICbFq2mbXH2MZQcOjPjYBopEWFZDCrrIDWuD0Or

VNKzLMJgAf0AGEKeXD0VPsVgAoKvOTTUNsDsSFBzMf
DiIlxdIJMTDz8LCyFlZ9cRTzspE8PxYQouHb8EHkDuK8A7cDhDhPB2JEB0EE8FQyIFTyViECy7F1K6hY

TnM9W5tAuJeDQ7BM1ZPM7QMZUjMW3+LdRvZ7JZLDwXQSB4DM3CxQZcNV4DyBCBI9CmrDVlPz7pMYLxkO

lwbHk+LeZhC2MIRLFTAMZ5YP6XmJWoOA4HvPEJI7Hc
tFOvIl9kGKvbEiPiCH0pNT3+ME3AUGEGNoEZLlIfTUvpQNrxUPWoVSn8R9O5TZDlQ4SIW0U4Z8o2k8kw

bj4+QY8VELWdG8WNCRPHZqPnZIxoGVygIFAkQQw4V2N3KXHsB5IJY9xmO2j4WQ3+PiANCiAgID4+DQo+

Ol1TST3cu5RuIIxlWTHhVR3igw9IVZxpFEGvN9GsTV
QiFExsW7XomSkmZG3XJTraZNlkQO7MBPFnTTL6YW5+FWzfrFSnIZ6AMzg/sBDtS1fwwSQeCVcysz2i82

u/NhNgFR7wEjIXXA6lX1RveMa9hcWQaw9MJ8tgEjyxJf9+JWfpTGs8LityjI6btHWokBt5pDN0jj0bHh

5qFGlbMQvvhJ9lidC0zG5vXVNjVsC4feD1bPQ2HP9n
Qq9KDQIoMFpnQBF6ImIFIOxktN3lYmJuOv2slNR2pVqjV9s5yd90Oq2vktbnYKi4AQ6jFq3dLu2pMKWn

v7folYA4VZ1rTcw+OBssIXVmDR1mXQE0NlLWLv0JACA4O9j5oI0qmAA1JB3GDaTnHIXqEQNmQBJxNVHc

ICAgICAgICAgICAgICAgICAgICAgICAgICAgICAgIC
AgICAgICAgICAgICAgICAgICAgICAgICAgICAgICAgICAgICAgICAgICAgICAgICAgICAgICANCiAgIC

AgICAgICAgICAgICAgICAgICAgICAgICAgICAgICAgICAgICAgICAgICAgICAgICAgICAgICAgICAgIC

AgICAgICAgICAgICAgICAgICAgICAgICAgICAgICAg
ICAgICANCiAgICAgICAgICAgICAgICAgICAgICAgICAgICAgICAgICAgICAgICAgICAgICAgICAgICAg

ICAgICAgICAgICAgICAgICAgICAgICAgICAgICAgICAgICAgICAgICAgICAgICANCiAgICAgICAgICAg

ICAgICAgICAgICAgICAgICAgICAgICAgICAgICAgIC
AgICAgICAgICAgICAgICAgICAgICAgICAgICAgICAgICAgICAgICAgICAgICAgICAgICAgICAgICANCi

AgICAgICAgICAgICAgICAgICAgICAgICAgICAgICAgICAgICAgICAgICAgICAgICAgICAgICAgICAgIC

AgICAgICAgICAgICAgICAgICAgICAgICAgICAgICAg
ICAgICAgICANCiAgICAgICAgICAgICAgICAgICAgICAgICAgICAgICAgICAgICAgICAgICAgICAgICAg

ICAgICAgICAgICAgICAgICAgICAgICAgICAgICAgICAgICAgICAgICAgICAgICAgICANCiAgICAgICAg

ICAgICAgICAgICAgICAgICAgICAgICAgICAgICAgIC
AgICAgICAgICAgICAgICAgICAgICAgICAgICAgICAgICAgICAgICAgICAgICAgICAgICAgICAgICAgIC

ANCiAgICAgICAgICAgICAgICAgICAgICAgICAgICAgICAgICAgICAgICAgICAgICAgICAgICAgICAgIC

AgICAgICAgICAgICAgICAgICAgICAgICAgICAgICAg
ICAgICAgICAgICANCiAgICAgICAgICAgICAgICAgICAgICAgICAgICAgICAgICAgICAgICAgICAgICAg

ICAgICAgICAgICAgICAgICAgICAgICAgICAgICAgICAgICAgICAgICAgICAgICAgICAgICANCiAgICAg

ICAgICAgICAgICAgICAgICAgICAgICAgICAgICAgIC
AgICAgICAgICAgICAgICAgICAgICAgICAgICAgICAgICAgICAgICAgICAgICAgICAgICAgICAgICAgIC

AgICANCjw/vYOnS0efgFAxzyH4N6icEu6RRn8XJO6qz5QjKZVeGHbspxKhDulNQpPeUICdXilOCml8HJ

mrEN3OjRWcD7GsU0RhJGitGQ6WGUOkEBGyeKGmDZLh
IVVzTjJ1DMFpFIpmOO0CwEKuWVqsTLYjPJDuTrHwGFMmWGWlTEHbZHSmWTKBMYEtBJWsRiOxXJPcIUOc

NY3ZMSXcD279kyDoAx8TUg2XJjDaOX9grk0JUlLeRUSeFxoBFcl3DAwvQH1WpZHmuJKhZoUyYVBUHtDv

J6dza9FzWiMuIDTZEBewSA1Aq9AqgHStJVz+Pg0KZW
2dp7NeHHyaLrPeMM1sfo8SUAeAFgWhK1QqgGriCXYdl8yqOHVnOU0kdSQdZPM4BGeumIJ7FB5nRNAQM4

9gjFulBOVZMBN8VTGiMTJjZjQcZJImUTo9TIZQQYiBApTeG1Lji9IuYkF2JYRxRpLaSTiqDMMiMsB6LQ

09gCqrWO4HGVOcWYLfRE20KUNnFXFdZr9LZe0ENsZd
GP8uux2QXhBhLYOgThiIOcl2ADxvFH4GuGOaW5CgfWLvc0oMUaWjY8BLOGMfLFMkUu4QFQWvLxMoITCe

CClrAP1lMOSzUNGDuUvgbdJ1GK0CLD0lgcByLV0ATtJuYp9nMf1IZxHfH0RpP3GwWRTsOJWLEVfmDM5K

LDqoPV1hLH1Ms7EWqAZxeI8mlt5JVIBpNMXkNmatie
3PUjpaJ2M7jUhwRRAtJtVxLPTATNwqBM0OUDHjLYI6EFKpLZMdSLEGYoYbM55nMS6XC8Crr48nDoR5BU

OtNbCdOEbqCO78jKkwnpQxmHEpnTogDG0YZq0+DQplbmRvYmoNCnhyZWYNCjAgMzUNCjAwMDAwMDAwMD

VxUyR2OwTmNb9UCRLmKHXzDRTrDmSxHNPePIEuNIpi
TVSuJUM8ZUqzCHFjFNJnVY6BIsGhYGCrAiNsIRGnIBKzPYYnpo6FOGCgCBLkMBQ3MgOeYYLnENQuQUmy

FZRhULFpDUBaFVIfVAVvJY3GLsNyTFFhEZSoSZSbYWVzXZVtkj8BSEJeKAGgFrAmEfJfTLScVZCvMRci

UIDpYPH7IOZ0OMHsNHZeJM1XAvPxKIGmRId1KjbxTM
QzUZXztr9KYRWnATJlUTv7VeCrDVVpJQJeKLylBXOnFXGwOdLzTZYvSGUvAG8YQySoKVOqEMK0SLFiQU

AeWXUxok9DFXOlACQwPzT3MvLnSXIyTRYmKBdqJNXbXIV6IVn8OOZyWZTxOC3SZwRxAUQdEPp4XOJyIC

WfXIHwid7GRNKqXSXhRBPsZSAcPIPvYXFpRTagGSMo
HHA3ZJQwFWDiVLPnJU3KVvDsJCUmVXpvDUakYXGwKAGpqz8RMUBxELVfZGQ8ZMUcLAYgNKNaLNajDYKu

HLXgBPu9ABPaUZFiPH9TQrUdSNUyZuH5OEUzJCWeROMakr6HNRGpTGRgYCq3AHOdRWLzDIMhSFusJCUy

DXCjPxF7WCXwDAEvWE7JSvCxGAVoGyD2DQCxUJNdXP
Drea3ROZZyPSCvIhj4TrGoZASrLMBsMYylYRNyHMYfNGZaRYNaRLUtDS0VDvBgSOFeCuUwCMcrMCUuDS

Voqa0DMFDjZBCqJWJ0AeDdJKHwNKZoVLneMPBjJSR2BhfxVVVxYSYqOO0XKsTyZZLgTnK2EGuzHEImCF

Asmj2QMFJcOJIlJOq2GoMrUYVmCKIcDLneVOEqWMU6
GYG3NLBaCIUwJB2PZkRuLLObUoI4XKcbUMPrKDGkrw1JZYCjIZCtTkH5UNCrYBEmTXAlIWd3wcIpjUHm

NIp7KO5NR8FwdvTqQzFWKe5Gu538JYB3OTPqQd3DN2uoYs4mANKqRDMZGj4JRLn1Owf6RPP0RBc9FoDc

K1LcMZq3Des3DHE5PMRnMQL1QvO+YTigAdr5EYH3TS
TtS7ZoTJTtKSfvBFdrSFa7D5W5DdZxUx3qPPMXDo2+PJyxdBPwnWzyVPQQEqD0RNMwVPoyCTSICu9N







                                        Procedure

 

                                          



                                                                                
                                                                                
                                                                                
                  



Social History

          



           Code       Duration   Value      Status     Description Data Source(s

)

 

           Smoking    10/05/2021 12:00:00 AM EDT Former Smoker completed  Former

 Smoker [email or web address] 

([facility])

 

                Alcohol intake  2021 12:00:00 AM EDT Current drinker of al

cohol (finding) 

completed                 Current drinker of alcohol (finding) [facility]

 

             Tobacco use and exposure 2021 12:00:00 AM EDT Never used   co

mpleted    Never 

used                                    [facility]

 

                    Cigarettes smoked current (pack per day) - Reported 20 12:00:00 AM EDT 

UNK                 completed                               [facility]

ospital

 

           Smoking    2021 12:00:00 AM EDT Former smoker completed  Former

 smoker [facility]

 

           Smoking    2021 12:00:00 AM EDT Former Smoker completed  Former

 Smoker eCW1 

([facility])

 

           Smoking    2021 12:00:00 AM EDT Former Smoker completed  Former

 Smoker eCW1 

([facility])

 

           Smoking    2021 12:00:00 AM EDT Former Smoker completed  Former

 Smoker eCW1 

([facility])

 

           Smoking    2021 12:00:00 AM EDT Former Smoker completed  Former

 Smoker eCW1 

([facility])

 

           Smoking    2021 12:00:00 AM EDT Former Smoker completed  Former

 Smoker eCW1 

([facility])

 

           Smoking    2021 12:00:00 AM EDT Former Smoker completed  Former

 Smoker eCW1 

([facility])

 

           Smoking    2021 12:00:00 AM EDT Former Smoker completed  Former

 Smoker eCW1 

([facility])

 

           Smoking    03/10/2021 12:00:00 AM EST Former Smoker completed  Former

 Smoker eCW1 

([facility])

 

           Smoking    03/10/2021 12:00:00 AM EST Former Smoker completed  Former

 Smoker eCW1 

([facility])

 

           Smoking    03/10/2021 12:00:00 AM EST Former Smoker completed  Former

 Smoker eCW1 

([facility])

 

           Smoking    03/10/2021 12:00:00 AM EST Former Smoker completed  Former

 Smoker eCW1 

([facility])

 

           Smoking    03/10/2021 12:00:00 AM EST Former Smoker completed  Former

 Smoker eCW1 

([facility])

 

           Smoking    2021 12:00:00 AM EST Former Smoker completed  Former

 Smoker eCW1 

([facility])

 

           Smoking    2021 12:00:00 AM EST Former Smoker completed  Former

 Smoker eCW1 

([facility])

 

           Smoking    2020 12:00:00 AM EST Former Smoker completed  Former

 Smoker eCW1 

([facility])

 

           Smoking    2020 12:00:00 AM EST Former Smoker completed  Former

 Smoker eCW1 

([facility])

 

           Smoking    10/29/2020 12:00:00 AM EDT Former Smoker completed  Former

 Smoker eCW1 

([facility])



                                                                                
                                                                                
                                                                                
                                                                    



Vital Signs

          



                    ID                  Date                Data Source

 

                    UNK                                      









           Name       Value      Range      Interpretation Code Description Data

 Source(s)

 

           Body weight 140.00 [lb_av]                       140.00 [lb_av] MEDEN

T ([facility] )

 

           Body mass index (BMI) [Ratio] 28.3 kg/m2                       28.3 k

g/m2 MEDENT ([facility])

 

           Ideal body weight 100 [lb_av]                       100 [lb_av] MEDEN

T ([facility])

 

           Body weight 63.504 kg                        63.504 kg  [facility] ([facility])

 

           Body surface area Derived from formula 1.58 m2                       

   1.58 m2    [facility] ([facility])

 

           Body height 59 [in_i]                        59 [in_i]  MEDENT ([facility])

 

                                        4'11" 

 

           Body weight 136.2 [lb_av]                       136.2 [lb_av] eCW1 ([facility])

 

           Body temperature 96.3 [degF]                       96.3 [degF] eCW1 (

[facility])

 

           Systolic blood pressure 122 mm[Hg]                       122 mm[Hg] e

CW1 ([facility])

 

           Diastolic blood pressure 80 mm[Hg]                        80 mm[Hg]  

eCW1 ([facility])

 

           Body weight 61.78 kg                         61.78 kg   eCW1 ([facility])

 

           Body height                                   [in_i]    eCW1 ([facility])

 

           Body mass index (BMI) [Ratio] 27.51 kg/m2                       27.51

 kg/m2 eCW1 ([facility])

 

           Heart rate 68 /min                          68 /min    eCW1 ([facility])

 

           Respiratory rate 18 /min                          18 /min    eCW1 ([facility])

 

           Diastolic blood pressure 83 mm[Hg]                        83 mm[Hg]  

MEDENT ([facility])

 

           Body height 59 [in_i]                        59 [in_i]  MEDENT ([facility])

 

                                        4'11" 

 

           Body weight 132.00 [lb_av]                       132.00 [lb_av] MEDEN

T ([facility])

 

           Systolic blood pressure 132 mm[Hg]                       132 mm[Hg] M

EDENT ([facility])

 

           Heart rate 75 /min                          75 /min    [facility]

 

           Body mass index (BMI) [Ratio] 26.7 kg/m2                       26.7 k

g/m2 [facility] ([facility]

 

           Body weight 59.875 kg                        59.875 kg  MEDENT ([facility])

 

           Body temperature 97.2 [degF]                       97.2 [degF] MEDENT

 ([facility])

 

           Body height 59 [in_i]                        59 [in_i]  MEDENT ([facility])

 

                                        4'11" 

 

           Body weight 128.00 [lb_av]                       128.00 [lb_av] MEDEN

T ([facility])

 

           Body mass index (BMI) [Ratio] 25.9 kg/m2                       25.9 k

g/m2 MEDENT ([facility])

 

           Ideal body weight 100 [lb_av]                       100 [lb_av] MEDEN

T ([facility])

 

           Body weight 58.061 kg                        58.061 kg  MEDENT ([facility])

 

           Body surface area Derived from formula 1.53 m2                       

   1.53 m2    [facility] ([facility])

 

           Body height 59 [in_i]                        59 [in_i]  MEDENT ([facility])

 

                                        4'11" 

 

           Body weight 132.00 [lb_av]                       132.00 [lb_av] MEDEN

T ([facility])

 

           Body mass index (BMI) [Ratio] 26.7 kg/m2                       26.7 k

g/m2 [facility] ([facility])

 

           Ideal body weight 100 [lb_av]                       100 [lb_av] MEDEN

T ([facility])

 

           [facility] 59.875 [facility]                        59.875 [facility]  [facility]

 

           Body surface area Derived from formula 1.55 m2                       

   1.55 m2    [facility] ([facility])

 

           Body weight 128 [lb_av]                       128 [lb_av] eCW1 ([facility])

 

           Body height                                   [in_i]    eCW1 ([facility])

 

           Body mass index (BMI) [Ratio] 25.85 kg/m2                       25.85

 kg/m2 eCW1 ([facility])

 

           Heart rate 74 /min                          74 /min    eCW1 ([facility]

 

           Respiratory rate 18 /min                          18 /min    eCW1 ([facility])

 

           Body temperature 97.5 [degF]                       97.5 [degF] eCW1 (

[facility])

 

           Systolic blood pressure 120 mm[Hg]                       120 mm[Hg] e

CW1 ([facility])

 

           Diastolic blood pressure 68 mm[Hg]                        68 mm[Hg]  

eCW1 ([facility])

 

           Body mass index (BMI) [Ratio] 24.2 kg/m2                       24.2 k

g/m2 MEDENT ([facility])

 

           Ideal body weight 100 [lb_av]                       100 [lb_av] MEDEN

T ([facility])

 

           Body surface area Derived from formula 1.48 m2                       

   1.48 m2    [facility] ([facility])

 

           Body weight 54.432 kg                        54.432 kg  [facility] ([facility])

 

           Body weight 120.00 [lb_av]                       120.00 [lb_av] MEDEN

T ([facility])

 

           Body height 59 [in_i]                        59 [in_i]  MEDENT ([facility])

 

                                        4'11" 

 

           Body height 59 [in_i]                        59 [in_i]  MEDENT ([facility])

 

                                        4'11" 

 

           Body weight 120.00 [lb_av]                       120.00 [lb_av] MEDEN

T ([facility])

 

           Body mass index (BMI) [Ratio] 24.2 kg/m2                       24.2 k

g/m2 [facility] ([facility])

 

           Ideal body weight 100 [lb_av]                       100 [lb_av] MEDEN

T ([facility])

 

           Body [facility] 54.432 kg                        54.432 [facility]  [facility]

 

           Body surface area Derived from formula 1.48 m2                       

   1.48 m2    [facility] ([facility])

 

           Body weight 121 [lb_av]                       121 [lb_av] eCW1 ([facility])

 

           Body height                                   [in_i]    eCW1 ([facility])

 

           Body mass index (BMI) [Ratio] 24.44 kg/m2                       24.44

 kg/m2 W1 ([facility])

 

           Heart rate 100 /min                         100 /min   eCW1 ([facility])

 

           Respiratory rate 18 /min                          18 /min    eCW1 ([facility])

 

           Body temperature 97.7 [degF]                       97.7 [degF] eCW1 (

[facility])

 

           Systolic blood pressure 130 mm[Hg]                       130 mm[Hg] e

CW1 ([facility])

 

           Diastolic blood pressure 78 mm[Hg]                        78 mm[Hg]  

eCW1 ([facility])

 

           Body weight 118.6 [lb_av]                       118.6 [lb_av] eCW1 ([facility])

 

           Body height                                   [in_i]    eCW1 ([facility])

 

           Body mass index (BMI) [Ratio] 23.95 kg/m2                       23.95

 kg/m2 eCW1 ([facility])

 

           Heart rate 84 /min                          84 /min    eCW1 ([facility])

 

           Respiratory rate 18 /min                          18 /min    eCW1 ([facility])

 

           Body temperature 97.7 [degF]                       97.7 [degF] eCW1 (

[facility])

 

           Systolic blood pressure 130 mm[Hg]                       130 mm[Hg] e

CW1 ([facility])

 

           Diastolic blood pressure 80 mm[Hg]                        80 mm[Hg]  

eCW1 ([facility])

 

           Body weight 114 [lb_av]                       114 [lb_av] eCW1 ([facility])

 

           Body height                                   [in_i]    eCW1 ([facility])

 

           Body mass index (BMI) [Ratio] 23.02 kg/m2                       23.02

 kg/m2 eCW1 ([facility])

 

           Heart rate 90 /min                          90 /min    eCW1 ([facility])

 

           Respiratory rate 18 /min                          18 /min    eCW1 ([facility])

 

           Body temperature 98.4 [degF]                       98.4 [degF] eCW1 (

[facility])

 

           Systolic blood pressure 132 mm[Hg]                       132 mm[Hg] e

CW1 ([facility])

 

           Diastolic blood pressure 80 mm[Hg]                        80 mm[Hg]  

eCW1 ([facility])

 

           Body weight 116 [lb_av]                       116 [lb_av] eCW1 ([facility])

 

           Body height                                   [in_i]    eCW1 ([facility])

 

           Body mass index (BMI) [Ratio] 23.43 kg/m2                       23.43

 kg/m2 eCW1 ([facility])

 

           Heart rate 88 /min                          88 /min    eCW1 ([facility])

 

           Respiratory rate 18 /min                          18 /min    eCW1 ([facility])

 

           Body temperature 98 [degF]                        98 [degF]  eCW1 ([facility])

 

           Systolic blood pressure 134 mm[Hg]                       134 mm[Hg] e

CW1 ([facility])

 

           Diastolic blood pressure 84 mm[Hg]                        84 mm[Hg]  

eCW1 ([facility])

 

           Body weight 118.4 [lb_av]                       118.4 [lb_av] eCW1 ([facility])

 

           Body height                                   [in_i]    eCW1 ([facility])

 

           Body mass index (BMI) [Ratio] 23.91 kg/m2                       23.91

 kg/m2 eCW1 ([facility])

 

           Heart rate 80 /min                          80 /min    eCW1 ([facility])

 

           Respiratory rate 18 /min                          18 /min    eCW1 ([facility])

 

           Body temperature 97.4 [degF]                       97.4 [degF] eCW1 (

[facility])

 

           Systolic blood pressure 144 mm[Hg]                       144 mm[Hg] e

CW1 ([facility])

 

           Diastolic blood pressure 80 mm[Hg]                        80 mm[Hg]  

eCW1 ([facility])

 

           Body weight 120.2 [lb_av]                       120.2 [lb_av] eCW1 ([facility])

 

           Body height                                   [in_i]    eCW1 ([facility])

 

           Body mass index (BMI) [Ratio] 24.27 kg/m2                       24.27

 kg/m2 eCW1 ([facility])

 

           Heart rate 88 /min                          88 /min    eCW1 ([facility])

 

           Respiratory rate 18 /min                          18 /min    eCW1 ([facility])

 

           Body temperature 98.8 [degF]                       98.8 [degF] eCW1 (

[facility])

 

           Systolic blood pressure 160 mm[Hg]                       160 mm[Hg] e

CW1 ([facility])

 

           Diastolic blood pressure 80 mm[Hg]                        80 mm[Hg]  

eCW1 ([facility])









                    ID                  Date                Data Source

 

                    [id number]          2021 02:37:22 PM EST [facility]









           Name       Value      Range      Interpretation Code Description Data

 Source(s)

 

           WEIGHT RECORDED 116 lb                           116 lb     [facility]

 

           Body height Measured 59 in                            59 in      [facility]



                                                                                
                            



Patient Treatment Plan of Care

          



             Planned Activity Planned Date Details      Description  Data Source

(s)

 

             tramadol hydrochloride 50 MG Oral Tablet 10/05/2021 12:00:00 AM EDT

                           eCW1 

([facility])

 

                          Amoxicillin 500 MG / Clavulanate 125 MG Oral Tablet [A

ugmentin] 2021 

12:00:00 AM EDT                                             eCW1 ([facility])

 

             Fluconazole 150 MG Oral Tablet [Diflucan] 2021 12:00:00 AM ED

T                           eCW1 

([facility])

 

                          Amoxicillin 500 MG / Clavulanate 125 MG Oral Tablet [A

ugmentin] 2021 

12:00:00 AM EDT                                             eCW1 ([facility])

 

             Fluconazole 150 MG Oral Tablet [Diflucan] 2021 12:00:00 AM ED

T                           eCW1 

([facility])

 

             hydrocortisone acetate 25 MG Rectal Suppository 2021 12:00:00

 AM EST                           

eCW1 ([facility])

 

                Hydrocortisone 25 MG/ML Topical Cream [Anusol HC] 2021 12:

00:00 AM EST                  

                                        eCW1 ([facility])

 

             hydrocortisone acetate 25 MG Rectal Suppository 2021 12:00:00

 AM EST                           

eCW1 ([facility])

 

                Hydrocortisone 25 MG/ML Topical Cream [Anusol HC] 2021 12:

00:00 AM EST                  

                                        eCW1 ([facility])

 

                Hydrocortisone 25 MG/ML Topical Cream [Anusol HC] 2021 12:

00:00 AM EST                  

                                        eCW1 ([facility])

 

             hydrocortisone acetate 25 MG Rectal Suppository 2021 12:00:00

 AM EST                           

eCW1 ([facility])

 

             hydrocortisone acetate 25 MG Rectal Suppository 2021 12:00:00

 AM EST                           

eCW1 ([facility])

 

                Hydrocortisone 25 MG/ML Topical Cream [Anusol HC] 2021 12:

00:00 AM EST                  

                                        eCW1 ([facility])

 

             hydrocortisone acetate 25 MG Rectal Suppository 2021 12:00:00

 AM EST                           

eCW1 ([facility])

 

                Hydrocortisone 25 MG/ML Topical Cream [Anusol HC] 2021 12:

00:00 AM EST                  

                                        eCW1 ([facility])

 

             duloxetine 20 MG Delayed Release Oral Capsule 2021 12:00:00 A

M EST                           

eCW1 ([facility])

 

             duloxetine 20 MG Delayed Release Oral Capsule 2021 12:00:00 A

M EST                           

eCW1 ([facility])

 

             duloxetine 20 MG Delayed Release Oral Capsule 2021 12:00:00 A

M [facility]

 

             Prochlorperazine 10 MG Oral Tablet 2020 12:00:00 AM [facility]

 

             Escitalopram 10 MG Oral Tablet [Lexapro] 10/29/2020 12:00:00 AM EDT

                           eCW1 

([facility])

## 2021-10-27 NOTE — CCD
Continuity of Care Document (CCD)

                             Created on: 2021



Xiomara Bains

External Reference #: [id number]

: 1955

Sex: Female



Demographics





                          Address                   [address] [place]  [address]

 

                          Home Phone                [phone]

 

                          Preferred Language        Unknown

 

                          Marital Status            Unknown

 

                          Protestant Affiliation     Unknown

 

                          Race                      White

 

                          Ethnic Group              Not  or 





Author





                          Author                    Xiomara CHAO MD

 

                          Organization              Unknown

 

                          Address                   [address]

[place]  [address]



 

                          Phone                     [phone]







Care Team Providers





                    Care Team Member Name Role                Phone

 

                    Lesa Alcantar PA-C AUTM                [phone]







Problems





                    Active Problems     Provider            Date

 

                    Temporomandibular joint disorder Kirby Chao MD     Onset: 

2014

 

                    Sensorineural hearing loss, bilateral Kirby Chao MD     On

set: 2014

 

                    Tinnitus            Kirby Chao MD     Onset: 2015

 

                    Conductive hearing loss, bilateral Kirby Chao MD     Onset

: 10/22/2015

 

                    Sensorineural hearing loss, bilateral Kirby Chao MD     On

set: 10/22/2015

 

                    Temporomandibular joint disorder Kirby Chao MD     Onset: 

2015







Social History





                Type            Date            Description     Comments

 

                Birth Sex                       Unknown          

 

                ETOH Use                        Denies alcohol use  

 

                Tobacco Use     Start: Unknown  Patient has never smoked  

 

                Recreational Drug Use                 Denies Drug Use  







Allergies, Adverse Reactions, Alerts





             Active Allergies Reaction     Severity     Comments     Date

 

             NKDA                                                2014

 

             Seasonal                                            2014







Medications





           Active Medications SIG        Qnty       Indications Ordering Provide

r Date

 

           Trazodone HCL                     50mg Tablets                       

                             Unknown    



 

             Hydrochlorothiazide                     12.5mg Tablets             

                                             

Unknown                                 

 

             Amlodipine Besylate                     5mg Tablets                

                                          

Unknown                                 

 

             Duloxetine HCL                     20mg Caps DR Part               

                                           

Unknown                                 

 

             Ketotifen Fumarate                     0.025% Solution             

                                             

Unknown                                 







Immunizations





                                        Description

 

                                        No Information Available







Vital Signs





                Date            Vital           Result          Comment

 

                08/10/2021  9:16am Height          59 inches       4'11"

 

                    Weight              128.00 lb            

 

                    BMI (Body Mass Index) 25.9 kg/m2           

 

                    Ideal Body Weight   100 lb               

 

                    Weight              58.061 kg            

 

                    BSA (Body Surface Area) 1.53 m2              

 

                2021 10:20am Height          59 inches       4'11"

 

                    Weight              132.00 lb            

 

                    BMI (Body Mass Index) 26.7 kg/m2           

 

                    Ideal Body Weight   100 lb               

 

                    Weight              59.875 kg            

 

                    BSA (Body Surface Area) 1.55 m2              







Results





                                        Description

 

                                        No Information Available







Procedures





                Date            Code            Description     Status

 

                08/10/2021      85274           Office/Outpatient Established SF

 MDM 10-19 Min Completed

 

                2021      40521           Office/Outpatient Established Lo

w MDM 20-29 Min Completed

 

                202105           Arthocentesis, Asp And/Or Inj, I

ntermed Joint  Or Bursa (TMJ) 

Completed

 

                06/10/2021      57327           Office/Outpatient New Low MDM 30

-44 Minutes Completed

 

                06/10/2021      70857           Binocular Microscopy Completed







Medical Devices





                                        Description

 

                                        No Information Available







Encounters





           Type       Date       Location   Provider   Dx         Diagnosis

 

           Office Visit 08/10/2021  9:10a [facility] Kirby Chao MD

 M26.603    

Bilateral temporomandibular joint disorder, unspecified

 

           Office Visit 2021 10:30a [facility] Kirby Chao MD

 M26.603    

Bilateral temporomandibular joint disorder, unspecified

 

           Office Visit 06/10/2021  7:20a [facility] Kirby Chao MD

 M26.603    

Bilateral temporomandibular joint disorder, unspecified







Assessments





                Date            Code            Description     Provider

 

                08/10/2021      M26.603         Bilateral temporomandibular join

t disorder, unspecified 

Kirby Chao MD

 

                2021      M26.603         Bilateral temporomandibular join

t disorder, unspecified 

Kirby Chao MD

 

                06/10/2021      M26.603         Bilateral temporomandibular join

t disorder, unspecified 

Kirby Chao MD







Plan of Treatment

Future Appointment(s):* 2021 10:00 am - Kirby Chao MD at [facility]

08/10/2021 - Kirby Chao MD* M26.603 Bilateral temporomandibular joint 
  disorder, unspecified





Functional Status





                                        Description

 

                                        No Information Available







Mental Status





                                        Description

 

                                        No Information Available







Referrals





                Refer to      Reason for Referral Status          Appt Date

 

                Kirby Chao M.D. NP CHRONIC LEFT EAR PAIN REF A [facility]

  Closed          

06/10/2021

 

                                        [address] [place] [address]

 

                                        [phone]

## 2021-10-27 NOTE — ROOR
________________________________________________________________________________

Patient Name: Xiomara Bains            Procedure Date: 10/27/2021 1:37 PM

MRN: [id number]                          Account Number: [id number]

YOB: 1955               Age: 66

Room: [facility]                            Gender: Female

Note Status: Finalized                 

________________________________________________________________________________

 

Procedure:            Colonoscopy to Anastomosis + Biopsies

Indications:          Follow-up of Crohn's disease of the colon, Disease 

                      activity assessment of Crohn's disease of the colon

Providers:            Quique Shell MD

Referring MD:         Michelle Umaña NP

Requesting Provider:  

Medicines:            Monitored Anesthesia Care

Complications:        No immediate complications.

________________________________________________________________________________

Procedure:            Pre-Anesthesia Assessment:

                      - The heart rate, respiratory rate, oxygen saturations, 

                      blood pressure, adequacy of pulmonary ventilation, and 

                      response to care were monitored throughout the procedure.

                      The Colonoscope was introduced through the anus and 

                      advanced to the ileocolonic anastomosis.

                                                                                

Findings:

     The perianal and digital rectal examinations were normal.

     Non-bleeding internal hemorrhoids were found during retroflexion. The 

     hemorrhoids were small and Grade I (internal hemorrhoids that do not 

     prolapse).

     No other significant abnormalities were identified in a careful 

     examination of the remainder of the colon.

     The terminal ileum appeared normal.

     Background biopsies were taken for histology with a cold forceps from the 

     transverse colon, descending colon and rectosigmoid colon. These biopsy 

     specimens were sent to Pathology.

     The exam was otherwise without abnormality.

                                                                                

Impression:           - Non-bleeding internal hemorrhoids.

                      - The examined portion of the ileum was normal.

                      - The examination was otherwise normal.

                      - Background biopsies were taken from the transverse 

                      colon, descending colon and rectosigmoid colon.

                      - The examination was otherwise normal.

Recommendation:       - Patient has a contact number available for 

                      emergencies. The signs and symptoms of potential delayed 

                      complications were discussed with the patient. Return to 

                      normal activities tomorrow. Written discharge 

                      instructions were provided to the patient.

                      - High fiber diet.

                      - Discharge patient to home.

                      - Continue present medications.

                      - Await pathology results.

                      - Telephone GI clinic for pathology results in 1 week.

                      - Return to referring physician.

                      - Repeat colonoscopy in 5 years for surveillance based 

                      on pathology results.

                      - The findings and recommendations were discussed with 

                      the patient.

                                                                                

Procedure Code(s):    --- Professional ---

                      62656, Colonoscopy, flexible; with biopsy, single or 

                      multiple

Diagnosis Code(s):    --- Professional ---

                      K64.0, First degree hemorrhoids

                      K50.10, Crohn's disease of large intestine without 

                      complications

 

CPT copyright 2019 [employer]. All rights reserved.

 

The codes documented in this report are preliminary and upon  review may 

be revised to meet current compliance requirements.

 

Quique Shell MD

____________________

Quique Shell MD

10/27/2021 1:58:36 PM

Electronically signed by Quique Shell MD

Number of Addenda: 0

 

Note Initiated On: 10/27/2021 1:37 PM

Estimated Blood Loss: Estimated blood loss: none.

## 2021-10-27 NOTE — CCD
Summary of Care

                             Created on: 2021



Xiomara Bains

External Reference #: [id number]

: 1955

Sex: Female



Demographics





                          Address                   [address] [place]  [address]

 

                          Home Phone                [phone]

 

                          Preferred Language        English

 

                          Marital Status            Single

 

                          Caodaism Affiliation     NON

 

                          Race                      White

 

                          Ethnic Group              Not  or 





Author





                          Author                    [facility]

 

                          Organization              [facility]

 

                          Address                   Unknown

 

                          Phone                     Unavailable







Support





                Name            Relationship    Address         Phone

 

                    Mirela Ayala     ECON                [address] [place]  [address]                    [phone]

 

                    Marko [place]  [address]                    [phone]

 

                    Lillie Nara       ECON                [address] [place], [place]                     [phone]

 

                    Monie Ge       ECON                [address] [place]  [address]               [phone]







Care Team Providers





                    Care Team Member Name Role                Phone

 

                    Marco A Stubbs MD     PCP                 [phone]







Reason for Visit

* 



 



                           Reason                    Comments

 

 



                           Follow-up                 6 month f/u, no testing









Encounter Details





                          Care Team                 Description



                     Date                Type                Department  

 

                                        



Kailee Martinez PA



[address]



[address]



[place] [address] [phone] (Fax)                      Malignant neoplasm of upper-outer quadra

nt of right breast in

female, estrogen receptor negative (Primary Dx); 

Status post chemotherapy; 

VUS-BRCA2; 

S/P breast reconstruction, bilateral; 

Status post bilateral mastectomy



                     2021          Office Visit        [facility]

  



                                         [place]  



                                         [address]  



                                         [address]  



                                         [place] [address] [phone]  







Allergies

No Known Active Allergiesdocumented as of this encounter (statuses as of 
2021)



Medications





                          End Date                  Status



              Medication   Sig          Dispensed    Refills      Start  



                                         Date  

 

                                                    Active



                 trazodone (DESYREL) 100  Take 100 mg     0                 



                     MG tablet           by mouth            6  



                                         nightly     

 

                                                    Active



                     Multiple Vitamin    Take 1              0   



                           (MULTIVITAMIN) capsule    capsule by     



                                         mouth daily.     

 

                                                    Active



                     cetirizine (ZYRTEC) 10 MG  Take 10 mg by       0   



                           tabletIndications:        mouth daily     



                           Seasonal Allergic         Indications:     



                           Rhinitis                  Hayfever     

 

                                                    Active



                     ibuprofen (ADVIL,MOTRIN)  Take 200 mg         0   



                           200 MG tablet             by mouth     



                                         every 6 (six)     



                                         hours as     



                                         needed  for     



                                         Pain     

 

                                                    Active



                     Zoledronic Acid (RECLAST  Inject into         0   



                           IV)Indications:           the vein Once     



                           osteopenia                a year     



                                         Indications:     



                                         osteopenia     

 

                                                    Active



                     terbinafine (LAMISIL) 250    0                    

 



                           MG tablet                 8  

 

                                                    Active



                 DULoxetine HCl 20 MG Oral  Take 20 mg by   0                 



                     Capsule Delayed Release  mouth daily         1  



                                         Particles (CYMBALTA)      

 

                                                    Active



                 Prochlorperazine Maleate  Take 10 mg by   0                 



                     10 MG Oral Tablet   mouth Three         0  



                           (COMPAZINE)               times daily     

 

                                                    Active



                     traZODone HCl 150 MG Oral    0                    

 



                           Tablet (DESYREL)          0  

 

                                                    Active



                     hydroCHLOROthiazide 12.5  Take 12.5 mg        0   



                           MG Oral Capsule           by mouth     



                           (MICROZIDE)               daily     

 

                                                    Active



                     amLODIPine Besylate 5 MG  Take 5 mg by        0   



                           Oral Tablet (NORVASC)     mouth daily     



documented as of this encounter (statuses as of 2021)



Active Problems





 



                           Problem                   Noted Date

 

 



                           VUS-BRCA2                 2021

 

 



                                         Overview:



                                         Formatting of this note might be differ

ent from the original.



                                         [facility]

 

 



                           Breast infection          2017

 

 



                           S/P breast reconstruction, bilateral  2017

 

 



                           Status post radiation therapy  2017

 

 



                           Status post bilateral mastectomy  2016

 

 



                           Encounter for plastic surgery follow-up  2016

 

 



                           Breast cancer             2016

 

 



                           Status post chemotherapy  2016

 

 



                           ER- carcinoma of right breast  2016

 

 



                           HER2-positive carcinoma of right breast  2016

 

 



                           Personal history of breast cancer  2016

 

 



                           Family history of breast cancer  2016

 

 



                           Family history of ovarian cancer  2016

 

 



                           Breast cancer of upper-outer quadrant of right female

 breast  2016

 

 



                                         Cancer Staging: Clinical stage from : yT0, N0, M0 - Signed by Jacki Aguiar MD on 2016

 

 



                           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

 

 



                           Axillary adenopathy       2016

 

 



                           Mass of right breast      2016



documented as of this encounter (statuses as of 2021)



Resolved Problems





  



                     Problem             Noted Date          Resolved Date

 

  



                     Abnormal genetic test  2016

 

 



                                         Overview:



                                         Formatting of this note might be differ

ent from the original.



                                         2016: [facility] = VUS of BRCA 

2



documented as of this encounter (statuses as of 2021)



Immunizations





  



                     Name                Administration Dates  Next Due



documented as of this encounter



Social History





                                        Date



                 Tobacco Use     Types           Packs/Day       Years Used 

 

                                        1973 - 2009



                     Former Smoker       Cigarettes          0.5  

 

    



                                         Smokeless Tobacco: Never   



                                         Used   







                                        Comments



                           Alcohol Use               Standard Drinks/Week 

 

                                        weekends occas.



                           Yes                       0 (1 standard drink = 0.6 o

z pure alcohol) 







 



                           Sex Assigned at Birth     Date Recorded

 

 



                                         Not on file 







                                        Date Recorded



                           COVID-19 Exposure         Response 

 

                                        2021 11:13 AM EDT



                           In the last month, have you been in contact with  No 

/ Unsure 



                                         someone who was confirmed or suspected 

to have  



                                         Coronavirus / COVID-19?  



documented as of this encounter



Last Filed Vital Signs





                    Reading             Time Taken          Comments



                                         Vital Sign   

 

                    121/72              2021 11:21 AM EDT  



                                         Blood Pressure   

 

                    -                   -                    



                                         Pulse   

 

                    -                   -                    



                                         Temperature   

 

                    -                   -                    



                                         Respiratory Rate   

 

                    -                   -                    



                                         Oxygen Saturation   

 

                    -                   -                    



                                         Inhaled Oxygen   



                                         Concentration   

 

                    59 kg (130 lb)      2021 11:21 AM EDT  



                                         Weight   

 

                    149.9 cm (4' 11")   2021 11:21 AM EDT  



                                         Height   

 

                    26.26               2021 11:21 AM EDT  



                                         Body Mass Index   



documented in this encounter



Progress Notes

* Kailee Martinez PA - 2021 11:15 AM EDT



Formatting of this note is different from the original.

Subjective: 



Chief Complaint 

Patient presents with 

 Follow-up 

  6 month f/u, no testing 



Xiomara Bains is a 66 y.o. female presents unaccompanied for follow-up.



HPI:



DIAGNOSIS: Clinical Stage III right invasive ductal carcinoma, grade 3, ER/PA ne
gative, HER-2/jesús positive -T4N2M0. Complete pathologic response to neoadjuvant 
chemotherapy.  BRCA 2-VUS.



Xiomara Bains is a pleasant 89 y.o. patient with history of right breast canc
er. She was diagnosed with clinical stage III invasive ductal carcinoma diagnose
d 2016. She completed neoadjuvant chemotherapy to include 6 cycles of T
CHP -  and then underwent bilateral mastectomies.  She completed radiation thera
py to the right breast on 16. And completed a year of herceptin



She did have revision of her bilateral breast implants with Dr. DIGGS in 2017 r
equiring latissimus dorsi flap reconstruction to the right and implant revision 
to the left. Her implants were down-sized accordingly. In 2018 she noted
some swelling in her lateral right chest wall after a vigorous work out in the 
gym for which she sought care by her oncologist in [place] . An U/S was perfor
med that was negative.



It is not causing her pain, no distal swelling no redness. 



Patient presents today for follow-up.  Patient was seen in  for an acute vis
it where she palpated an abnormality on her right reconstructed breast.  She had
a sono done that day which demonstrated fat necrosis.  Patient reports that she 
can no longer palpate the abnormality and it does not bother her anymore.  She 
reports that she follows with Dr. Ge in [place].  Patient reports that sin
 she has noticed that she has gained some weight.  She denies any unexpla
ined joint aches.  Patient reports that overall she feels well and she is now 5 
years out.



No Known Allergies



Past Medical History: 

Diagnosis Date 

 Anemia  

 not recent, had with chemo 

 Anxiety  

 Blood transfusion without reported diagnosis  

 no reaction 

 BRCA negative  

 [employer]- 2016- VUS- BRCA2 

 Breast cancer 2016 

 Stage III right invasive ductal carcinoma, grade 3, ER/PA negative, HER-2/jesús p
ositive -T4N2M0 

 C. difficile colitis  

 Crohn's disease  

 Depression  

 Endometriosis  

 Hx antineoplastic chemotherapy  

 6 cycles of TCHP - Taxol, Carboplatin, Herceptin and Perjeta 

 Hypertension  

 Insomnia  



Past Surgical History: 

Procedure Laterality Date 

  Status post radiation completed 2016   

 BREAST BIOPSY   

 Right breast 

 COLECTOMY   

 small bowel 

 COLONOSCOPY   

 laproscopic surgery   

 For Endometriosis 

 PA INSJ/RPLCMT BREAST IMPLANT SEP DAY MASTECTOMY Bilateral 2017 

 Procedure: second stage breast reconstruction with expander exchange for implan
ts;  Surgeon: Bill Rodriguez MD;  Location: [facility];  Service: General;  La
terality: Bilateral; 

 PA MAMMO BREAST BX PROC TOMOSYNTHESIS GUIDE 1ST LESION N/A 2017 

 Procedure: UNLISTED PROC, BREAST;  Surgeon: Bill Rodriguez MD;  Location
: [facility];  Service: General;  Laterality: N/A; 

 PA MASTECTOMY, MODIFIED RADICAL Right 2016 

 Procedure: RIGHT MASTECTOMY,MODIFIED RADICAL,INCL AXILLARY LYMPH NODES W/W/O PE
CTORALIS MINOR MUSCLE,EXCL MAJOR MUSCLE WITH SENTINEL NODE INJECTION;  Surgeon: 
Karen Dos Santos MD;  Location: [facility];  Service: General;  Laterality: Right; 

 PA MASTECTOMY, SIMPLE, COMPLETE Left 2016 

 Procedure: LEFT MASTECTOMY SIMPLE, RIGHT SENTINEL NODE, RIGHT MODIFIED MASTECTO
MY WITH LYMPH NODE DISSECTION;  Surgeon: Karen Dos Santos MD;  Location: OR 
;  Service: General;  Laterality: Left; 

 PA MUSCLE-SKIN FLAP,TRUNK N/A 2017 

 Procedure: right breast latissimus dorsi flap, myocutaneous flap, left revision
of reconstructed breast PER DR. RODRIGUEZ;  Surgeon: Bill Rodriguez MD;  
Location: OR [facility];  Service: General;  Laterality: N/A; 

 PA TISSUE EXPANDER PLACEMENT BREAST RECONSTRUCTION Bilateral 2016 

 Procedure: BREAST RECONSTRUCTION W/TISSUE EXPANDER, IMMEDIATE/DELAYED, W/SUBSEQ
EXPANSION;  Surgeon: Bill Rodriguez MD;  Location: OR ;  Service: Gene
[facility];  Laterality: Bilateral; 



    

Social History 



Socioeconomic History 

 Marital status: Single 

  Spouse name: Not on file 

 Number of children: 0 

 Years of education: Not on file 

 Highest education level: Not on file 

Occupational History 

 Not on file 

Tobacco Use 

 Smoking status: Former Smoker 

  Packs/day: 0.50 

  Types: Cigarettes 

  Start date: 1973 

  Quit date: 2009 

  Years since quittin.7 

 Smokeless tobacco: Never Used 

Substance and Sexual Activity 

 Alcohol use: Yes 

  Comment: weekends occas. 

 Drug use: No 

 Sexual activity: Not on file 

Other Topics Concern 

 Not on file 

Social History Narrative 

 Not on file 



Social Determinants of Health 



Financial Resource Strain:  

 Difficulty of Paying Living Expenses:  

Food Insecurity:  

 Worried About Running Out of Food in the Last Year:  

 Ran Out of Food in the Last Year:  

Transportation Needs:  

 Lack of Transportation (Medical):  

 Lack of Transportation (Non-Medical):  

Physical Activity:  

 Days of Exercise per Week:  

 Minutes of Exercise per Session:  

Stress:  

 Feeling of Stress :  

Social Connections:  

 Frequency of Communication with Friends and Family:  

 Frequency of Social Gatherings with Friends and Family:  

 Attends Caodaism Services:  

 Active Member of Clubs or Organizations:  

 Attends Club or Organization Meetings:  

 Marital Status:  

Intimate Partner Violence:  

 Fear of Current or Ex-Partner:  

 Emotionally Abused:  

 Physically Abused:  

 Sexually Abused:  

   

   

Family History 

Problem Relation Age of Onset 

 Hypertension Mother  

 Other Mother  

     In assisted Living 

 Colon cancer Mother 55 

 Diabetes Father  

 Diabetes type II Father  

 Hypertension Father  

 Other Father  

     Brain bleed/bacterial infection brain 

 Crohn's disease Sister  

     has an ileostomy 

 Crohn's disease Brother  

 Breast cancer Sister  

 Skin cancer Brother  

 Colon cancer Maternal Aunt  

 Colon cancer Maternal Uncle  



Current Outpatient Medications Ordered in Epic 

Medication Sig Dispense Refill 

 amLODIPine Besylate 5 MG Oral Tablet (NORVASC) Take 5 mg by mouth daily  


 cetirizine (ZYRTEC) 10 MG tablet Take 10 mg by mouth daily Indications: H
ayfever   

 DULoxetine HCl 20 MG Oral Capsule Delayed Release Particles (CYMBALTA) Ta
ke 20 mg by mouth daily   

 hydroCHLOROthiazide 12.5 MG Oral Capsule (MICROZIDE) Take 12.5 mg by mout
h daily   

 ibuprofen (ADVIL,MOTRIN) 200 MG tablet Take 200 mg by mouth every 6 (six)
hours as needed  for Pain   

 Multiple Vitamin (MULTIVITAMIN) capsule Take 1 capsule by mouth daily.   

 trazodone (DESYREL) 100 MG tablet Take 100 mg by mouth nightly    

 Zoledronic Acid (RECLAST IV) Inject into the vein Once a year Indications
: osteopenia   

 Prochlorperazine Maleate 10 MG Oral Tablet (COMPAZINE) Take 10 mg by mout
h Three times daily (Patient not taking: Reported on 2021)   

 terbinafine (LAMISIL) 250 MG tablet  (Patient not taking: Reported on )   

 traZODone HCl 150 MG Oral Tablet (DESYREL)  (Patient not taking: Reported
on 2021)   



No current [facility]-ordered facility-administered medications on file. 

 



Review of Systems

Treatment side effects:  no complaints

A comprehensive review of systems pertinent positives outlined in HPI

 

Objective: 



Visit Vitals

/72 

Ht 1.499 m (4' 11") 

Wt 59 kg (130 lb) 

BMI 26.26 kg/m 



General appearance: alert, appears stated age and cooperative

Head: Normocephalic, without obvious abnormality, atraumatic

Eyes: negative

Neck: no adenopathy, no JVD, supple, symmetrical, trachea midline and thyroid no
t enlarged, symmetric, no tenderness/mass/nodules

Breasts: Examinations on this patient seated and supine.  She is status post cynthia
ateral mastectomies with reconstruction.  Surgical incisions are well-healed.  R
ight breast I can no longer palpate the abnormality that I felt in .  No oth
er palpable abnormalities are noted no suspicious rashes or lesions.  

No nipple retraction or dimpling, No nipple discharge or bleeding, No axillary o
r supraclavicular adenopathy, Normal to palpation without dominant masses

Extremities: extremities normal, atraumatic, no cyanosis or edema

Skin: Skin color, texture, turgor normal. No rashes or lesions

Lymph nodes: Cervical, supraclavicular, and axillary nodes normal.

 



Assessment: 



The patient is a 66 year-old female who presents today for followup of her stage
III right invasive ductal carcinoma, grade 3, ER/PA negative, HER-2 positive on 
presentation. 



She continues to follow with Dr. Maddox for oncology in [place].  She is encour
aged to continue followup with her oncologist in [place].  No evidence of recu
rrence on clinical exam.



Genetic testing:  [facility] VUS-BRCA2; 2016



Patient presents today for follow-up.  She denies any complaints.  I can no long
er appreciate the abnormality that I noted on her exam in  and neither can p
atient.  No suspicious findings on exam.  She is 5 years out so I am going to ad
ge her to annual follow-up.  We will see her in 1 year.  Should she any quest
ions or concerns prior to follow-up she will contact her office.

Problem List Items Addressed This Visit  

 

 Other 

 Breast cancer of upper-outer quadrant of right female breast - Primary 

 S/P breast reconstruction, bilateral 

 Status post bilateral mastectomy 

 Status post chemotherapy 

 VUS-BRCA2 

 



Plan: 



Follow-up 1 year



My supervising provider is: Dr. Joselin Martin, who is present and available. 



Total time spent with patient 20 minutes and over 50% was with counseling and ca
re coordination.

Some of this dictations copied and edited from previous progress notes for hue
nuity of care



Electronically signed by SHAAN Tubbs at 2021 11:52 AM EDT

documented in this encounter



Plan of Treatment





                          Care Team                 Description



                     Date                Type                Specialty  

 

                                        



Karen Dos Santos MD



[address]



[address], [place] [address]



[phone] (Fax)                       



                     2022          Office Visit        Breast Surgery  







   



                 Health Maintenance  Due Date        Last Done       Comments

 

   



                           Hepatitis C Screening (B.  [birth year])   

 

   



                           MMR Vaccines (1 of 1 -    1958  



                                         Standard series)   

 

   



                           Varicella Vaccines (1 of  1958  



                                         2 - 2-dose childhood   



                                         series)   

 

   



                           Pneumococcal Vaccine: 65+  1963  



                                         Years (1 of 4 - PCV13)   

 

   



                           Pneumococcal Vaccine:     1963  



                                         Pediatrics (0 to 5 Years)   



                                         and At-Risk Patients (6   



                                         to 64 Years) (1 of 4 -   



                                         PCV13)   

 

   



                           DTaP,Tdap,and Td Vaccines  1964  



                                         (1 - Tdap)   

 

   



                           HIV Screening             1970  

 

   



                           Cervical Cancer Screening  1978  



                                         5 years   

 

   



                           Colon Cancer Screening 10  2007  



                                         yrs   

 

   



                           Zoster Vaccines (1 of 2)  2007  

 

   



                     COVID-19 Vaccine (3 -  04/15/2021          2021, 



                           [employer] risk 3-dose        2021 



                                         series)   

 

   



                     Influenza Vaccine   10/01/2021          10/29/2020, 



                                         10/21/2018, 



                                         2017 

 

   



                     Breast Cancer Screening 2  2023, 



                           years                     2021, 



                                         2021, 



                                         Additional 



                                         history 



                                         exists 

 

   



                     HIB Vaccines        Aged Out            No longer eligible 

based on patient's age to



                                         complete this topic

 

   



                     Hepatitis A Vaccines  Aged Out            No longer eligibl

e based on patient's age to



                                         complete this topic

 

   



                     Hepatitis B Vaccines  Aged Out            No longer eligibl

e based on patient's age to



                                         complete this topic

 

   



                     IPV Vaccines        Aged Out            No longer eligible 

based on patient's age to



                                         complete this topic



documented as of this encounter



Implants





                    Device Identifier   Shelf Expiration Date Model / Serial / L

ot



                 Implanted       Type            Area            Manufactur   



                                         er   

 

                                        2017          [id number] /

 /

[id number]



                     Graft Alloderm Shaped 8x16cm -   Left: Chest         LIFE C

ELL   



                           [id number]                 CORPORATIO   



                           Implanted: Qty: 1 on 2016 by    Bill Lima [facility] at [facility]     

 

 

                                        2017          [id number] /

 /

[id number]



                     Graft Alloderm Shaped 8x16cm -   Right: Chest        LIFE C

ELL   



                           [id number]                 CORPORATIO   



                           Implanted: Qty: 1 on 2016 by    Bill Lima MD at [facility]     

 

 

                                        02/10/2020          354-9213 /

                                        [id number] /

                                        [id number]



                     Expander Tissue 450cc Cpx4 W/T -   Left: Chest         MENT

OR   



                           [id number]              MILTON   



                                         Implanted: Qty: 1 on 2016 by     

 



                                         Bill Rodriguez MD at OR      

 

 

                                        2020          354-9813 /

                                        [id number] /

                                        [id number]



                     Expander Tissue 450cc Cpx4 W/T -   Right: Chest        MENT

OR   



                           [id number]              MILTON   



                                         Implanted: Qty: 1 on 2016 by     

 



                                         Bill Rodriguez MD at OR      

 

 

                                                            334-1452 /

                                        [id number] /

                                        [id number]



                     Breast Mm+/Mod Prof 685cc -   Right: Breast       MENTOR   



                           [id number]              MILTON   



                                         Implanted: Qty: 1 on 2017 by     

 



                                         Bill Rodriguez MD at OR      

 

 

                                                            334-1402 /

                                        [id number] /

                                        [id number]



                     Breast Mh+/High Prof 620cc -   Left: Breast        MENTOR  

 



                           [id number]              MILTON   



                                         Implanted: Qty: 1 on 2017 by     

 



                                         Bill Rodriguez MD at OR      

 

 

                                        2021          334-1102 /

                                        [id number] /

N/A



                           Breast Mh+/High Prof 300cc. -    MENTOR   



                           [id number]              MILTON   



                                         Implanted: Qty: 1 on 2017 by     

 



                                         Bill Rodriguez MD at OR      

 



                                         5E      

 

                                        2021          334-1152 /

                                        [id number] /

N/A



                           Breast Mh+/High Prof 345cc -    MENTOR   



                           [id number]              MILTON   



                                         Implanted: Qty: 1 on 2017 by     

 



                                         Bill Rodriguez MD at OR      

 



                                         5E      



documented as of this encounter



Results

Not on filedocumented in this encounter



Visit Diagnoses









                                         Diagnosis

 





                                         Malignant neoplasm of upper-outer quadr

ant of right breast in female, estrogen 

receptor negative -



                                         Primary

 





                                         Status post chemotherapy



                                         Convalescence following chemotherapy

 





                                         VUS-BRCA2



                                         Screening for genetic disease carrier s

tatus

 





                                         S/P breast reconstruction, bilateral



                                         Breast replaced by other means

 





                                         Status post bilateral mastectomy



                                         Acquired absence of breast and nipple



documented in this encounter

## 2021-10-27 NOTE — CCD
Summarization of Episode Note

                             Created on: 2021



NARA GINGER MICHAEL

External Reference #: [id number]

: 1955

Sex: Female



Demographics





                          Address                   [address] [place]  [address]

 

                          Home Phone                [phone]

 

                          Preferred Language        Unknown

 

                          Marital Status            Unknown

 

                          Jewish Affiliation     Unknown

 

                          Race                      White

 

                          Ethnic Group              Not  or 





Author





                          Author                    [facility]

ems

 

                          Organization              [facility]

ems

 

                          Address                   Unknown

 

                          Phone                     Unavailable







Support





                Name            Relationship    Address         Phone

 

                    GINGER RAMIREZ                [address] [place]  [address]                    [phone]

 

                    Nara Marko      JOSELYN                [address] [place]  [address]                    [phone]







Care Team Providers





                    Care Team Member Name Role                Phone

 

                    Michelle Umaña    Unavailable         [phone]







PROBLEMS





          Type      Condition ICD9-CM Code RIG11-SW Code Onset Dates Condition S

tatus W/U 

Status              Risk                SNOMED Code         Notes

 

       Problem Hx of Crohn's disease        Z87.19        Active confirmed      

  [id number]  

 

       Problem Wellness examination        Z00.00        Active confirmed       

 032048668  

 

       Problem Breast nodule        N63           Active confirmed        702112

03  

 

       Problem Insomnia        G47.00        Active confirmed        104066068  

 

       Problem Family history of colon cancer        Z80.0         Active confir

med        329065644  

 

       Problem Low magnesium levels        E83.42        Active confirmed       

 949022373  

 

        Problem GERD (gastroesophageal reflux disease)         K21.9           A

ctive  confirmed         

[id number]                               She has a history of esophageal reflux d

isease which is quiescent at 

present and for which she takes no medication.

 

       Problem Dermatitis        L30.9         Active confirmed        62753587 

 

 

       Problem Abnormal mammogram        R92.8         Active confirmed        1

56248745  

 

       Problem Uterine mass        N85.9         Active confirmed        2267039

4  

 

       Problem Left shoulder pain        M25.512        Active confirmed        

85899850  

 

       Problem Essential hypertension        I10           Active confirmed     

   19485495 She is 

maintained on lisinopril therapy with good control of her blood pressure.

 

          Problem   Breast cancer metastasized to axillary lymph node           

C50.919             Active    

confirmed                               440829964            

 

       Problem Magnesium deficiency        E61.2         Active confirmed       

 413460843  

 

        Problem History of Clostridium difficile colitis         Z86.19         

 Active  confirmed         

327955917                                

 

       Problem Anemia        D64.9         Active confirmed        115809107  

 

       Problem Nausea        R11.0         Active confirmed        650569575  

 

       Problem Internal hemorrhoids        K64.8         Active confirmed       

 58403306  

 

        Problem TMJ (temporomandibular joint disorder)         M26.609         A

ctive  confirmed         

52375617                                 

 

       Problem Toenail fungus        B35.1         Active confirmed        08504

9006  

 

       Problem Other chronic pain        G89.29        Active confirmed        8

9768492  

 

        Problem Hx of temporomandibular joint disorder         Z87.39          A

ctive  confirmed         

866901506                                

 

       Problem Anxiety        F41.9         Active confirmed        88908940  

 

        Problem Malignant neoplasm of right female breast         C50.911       

  Active  confirmed  

                          [id number]                 She was diagnosed with breas

t cancer in 2016 involving the 

right breast--HER-2 positive, ER/NM negative disease. She was treated with 
neoadjuvant chemotherapy (TRYPHAENA regimen) which she has now completed. She is
now scheduled for bilateral mastectomies and reconstruction surgery on 
2016.

 

       Problem Fatty liver        K76.0         Active confirmed        32832767

7  

 

        Problem Ductal carcinoma of right breast         C50.911         Active 

 confirmed         

539964159                                

 

          Problem   Alcohol withdrawal syndrome without complication           F

10.230             Active    

confirmed                               806135948            







ALLERGIES

No Known Allergies



ENCOUNTERS from 1955 to 2021





             Encounter    Location     Date         Provider     Diagnosis

 

                    [address] [phone] [place] [address] 24 Aug, 2021

                          Michelle Servage             







IMMUNIZATIONS





                Vaccine         Route           Administration Date Status

 

                Influenza 18 yrs & older Flublok IM Intramuscular Oct 29, 2020  

  Administered







SOCIAL HISTORY

Tobacco Use:



                    Social History Observation Description         Date

 

                    Details (start date - stop date) Former Smoker        



Sex Assigned At Birth:



                          Social History Observation Description

 

                          Sex Assigned At Birth     Unknown



Audit



                    Question            Answer              Notes

 

                    Total Score:        3                    

 

                    Interpretation:     Alcohol Education    



Sexual Hx:



                    Question            Answer              Notes

 

                    Had sex in the last 12 months (vaginal, oral, or anal)? No  

                 

 

                    Have you ever had an STD? No                   



Drug and Alcohol



                    Question            Answer              Notes

 

                    Total Score:        0                    

 

                    Interpretation:     No problems reported  



Alcohol Screening:



                    Question            Answer              Notes

 

                    Points              0                    

 

                    Interpretation      Negative             



Tobacco Use:



                    Question            Answer              Notes

 

                    Are you a:          former smoker        

 

                    How long has it been since you last smoked? 5-10 years      

     







REASON FOR REFERRAL

No Information



VITAL SIGNS

No information



MEDICATIONS





           Medication SIG (Take, Route, Frequency, Duration) Notes      Start Da

te End Date   

Status

 

           DULoxetine HCl 20 MG 1 capsule Orally Daily for 90 days              

                    Active

 

                          hydroCHLOROthiazide 12.5 MG 1 capsule in the morning O

rally Once a day for 90 

days                                                            Active

 

                          Ketotifen Fumarate 0.025 % 1 drop into both eyes Ophth

almic Twice a day prn 

itching for 25                                                  Active

 

           Trazodone HCl 100 MG 1 tablet at bedtime Orally Once a day for 90 day

s                                  

Active

 

                          Diflucan 150 MG           1 tablet Orally as directed 

1 today and repeat 1 in 10 days for 

2 days                          25 Aug, 2021                    Active

 

           amLODIPine Besylate 5 MG 1 tablet Orally twice daily for 90 days     

                             Active

 

           Loratadine 10 MG 1 tablet Orally Once a day for 90 days            09

 Oct, 2019            Active

 

           Augmentin 500-125 MG 1 tablet Orally bid for 10 days            2021            Active







PROCEDURES

No Information



RESULTS

No Results



REASON FOR VISIT

refills 



MEDICAL (GENERAL) HISTORY





                    Type                Description         Date

 

                          Medical History           right breast, stage IIIA, T3

N2M0, HER2 positive, ER/NM negative.

Invasive ductal carcinoma, stage 0, T0 N0 M0 following brenton-adjuvant combined 
chemotherapy status post bilateral mastectomy with immediate 
reconstruction16 [facility] tumor involves approximately 70% of the submitted 
tissue                                   

 

                          Medical History           breast cancer treated with D

exedrine, carboplatin with  dual HER

2 agent, Herceptin and Perjeta on the TRYPHENA regimen  

 

                          Medical History           bilateral mastectomy  2016 [facility] (chose to have 

both breast removed)                     

 

                    Medical History     Hx Endometrosis      

 

                    Medical History     Depression/Anxiety use to follow with Dr Paul Ramirez  

 

                    Medical History     Insomnia             

 

                    Medical History     remote smoker        

 

                          Medical History           Crohn's use to follow with DARLENE Casanova seen by Dr. Shell 2016, advised to follow on a when necessary basis  

 

                    Medical History     C. difficile colitis 2016 after

 first infusion  

 

                          Medical History           echocardiogram 07/15/2016, e

chocardiogram 2016, LVEF 65% 

normal left ventricular size and systolic function normal diastolic function. No
significant valvular disease.            

 

                          Medical History           colonoscopy 2016, grad

e 1 internal hemorrhoids, localized 

mild inflammation was found in the rectosigmoid colon secondary to colitis.  

 

                    Medical History     17 [facility]  

 

                    Surgical History    endometriosis       80's

 

                    Surgical History    Partial colectomy for Crohn's disease 

 

                    Surgical History    Bilateral mastectomy with reconstruction

 2016

 

                    Hospitalization History [facility] Crohns-colonic resection 

 

                    Hospitalization History Ruptured ear drums   

 

                          Hospitalization History   [facility]- Primary-Intractable pamela

rrhea secondary to colitis- 

Secondary-History of C-Diff in past(ruled out), Right breast cancer undergoing 
chemotherapy, History of Crohn's disease status post colon resection in past 

-2016

 

                          Hospitalization History   [facility]- Fevers, Secondary throm

ocytopenia, Diarrhea with a 

negative GI panel, Hypokalemia, Hypomagnesemia, Ductal carcinoma of the right 
breast, Hx of crohns disease, HTN, Anemia -2016

 

                          Hospitalization History   [facility]-acute alcohol withdrawal

, hypertensive urgency 

anxiety                                 3/5-2021







Goals Section

No Information



Health Concerns

No Information



MEDICAL EQUIPMENT

No Information



MENTAL STATUS

No Information



FUNCTIONAL STATUS

No Information



ASSESSMENTS

No Information



PLAN OF TREATMENT

Medication



                Medication Name Sig             Start Date      Stop Date

 

                DULoxetine HCl 20 MG 1 capsule Orally Daily for 90 days         

         

 

                amLODIPine Besylate 5 MG 1 tablet Orally twice daily for 90 days

                  

 

                          hydroCHLOROthiazide 12.5 MG 1 capsule in the morning O

rally Once a day for 90 

days                                                 

 

                          Diflucan 150 MG           1 tablet Orally as directed 

1 today and repeat 1 in 10 days for 

2 days                    25 Aug, 2021               

 

                Trazodone HCl 100 MG 1 tablet at bedtime Orally Once a day for 9

0 days                  

 

                Augmentin 500-125 MG 1 tablet Orally bid for 10 days 25 Aug, 202

1     



Next Appt



                                        Details

 

                                        Provider Name:Michelle Umaña, 2021-10-

05 10:00:00 AM, [address] [facility], 

[phone], [place], [address], [phone]







Insurance Providers





             Payer Name   Payer Address Payer Phone  Insured Name Patient Relati

onship to 

Insured                   Coverage Start Date       Coverage End Date

 

           [facility]        [address]   [facility] [address] [phone] GINGER VANCE self

## 2021-10-27 NOTE — CCD
Continuity of Care Document (CCD)

                             Created on: 10/26/2021



Xiomara Bains

External Reference #: [id number]

: 1955

Sex: Female



Demographics





                          Address                   [address] [place]  [address]

 

                          Home Phone                [phone]

 

                          Preferred Language        Unknown

 

                          Marital Status            Unknown

 

                          Sabianist Affiliation     Unknown

 

                          Race                      White

 

                          Ethnic Group              Not  or 





Author





                          Author                    Xiomara CHAO MD

 

                          Organization              Unknown

 

                          Address                   [address] [facility] 

[address]

[place]  [address]



 

                          Phone                     [phone]







Care Team Providers





                    Care Team Member Name Role                Phone

 

                    TanLesa P.A.-C AUTM                [phone]

[phone]







Problems





                    Active Problems     Provider            Date

 

                    Temporomandibular joint disorder Kirby Chao MD     Onset: 

2014

 

                    Sensorineural hearing loss, bilateral Kirby Chao MD     On

set: 2014

 

                    Tinnitus            Kirby Chao MD     Onset: 2015

 

                    Conductive hearing loss, bilateral Kirby Chao MD     Onset

: 10/22/2015

 

                    Sensorineural hearing loss, bilateral Kirby Chao MD     On

set: 10/22/2015

 

                    Temporomandibular joint disorder Kirby Chao MD     Onset: 

2015







Social History





                Type            Date            Description     Comments

 

                Birth Sex                       Unknown          

 

                ETOH Use                        Denies alcohol use  

 

                Tobacco Use     Start: Unknown  Patient has never smoked  

 

                Recreational Drug Use                 Denies Drug Use  







Allergies and adverse reactions





             Active Allergies Criticality  Reaction | Severity Comments     Date

 

             NKDA         Unable to assess criticality                          

 2014

 

             Seasonal     Unable to assess criticality                          

 2014







Medications





           Active Medications SIG        Qnty       Indications Ordering Provide

r Date

 

           Trazodone HCL                     50mg Tablets                       

                             Unknown    



 

             Hydrochlorothiazide                     12.5mg Tablets             

                                             

Unknown                                 

 

             Duloxetine HCL                     20mg Caps DR Part               

                                           

Unknown                                 

 

             Ketotifen Fumarate                     0.025% Solution             

                                             

Unknown                                 







Immunizations





                                        Description

 

                                        No Information Available







Vital Signs





                Date            Vital           Result          Comment

 

                10/26/2021  2:12pm Height          59 inches       4'11"

 

                    Weight              140.00 lb            

 

                    BMI (Body Mass Index) 28.3 kg/m2           

 

                    Ideal Body Weight   100 lb               

 

                    Weight              63.504 kg            

 

                    BSA (Body Surface Area) 1.58 m2              

 

                08/10/2021  9:16am Height          59 inches       4'11"

 

                    Weight              128.00 lb            

 

                    BMI (Body Mass Index) 25.9 kg/m2           

 

                    Ideal Body Weight   100 lb               

 

                    Weight              58.061 kg            

 

                    BSA (Body Surface Area) 1.53 m2              







Results





                                        Description

 

                                        No Information Available







Procedures





                Date            Code            Description     Status

 

                08/10/2021      83852           Office/Outpatient Established SF

 MDM 10-19 Min Completed

 

                2021      96047           Office/Outpatient Established Lo

w MDM 20-29 Min Completed

 

                202105           Arthocentesis, Asp And/Or Inj, I

ntermed Joint  Or Bursa (TMJ) 

Completed

 

                06/10/2021      27595           Office/Outpatient New Low MDM 30

-44 Minutes Completed

 

                06/10/2021      13046           Binocular Microscopy Completed







[facility] [employer]





                                        Description

 

                                        No Information Available







Encounters





           Type       Date       Location   Provider   Dx         Diagnosis

 

           Office Visit 08/10/2021  9:10a [facility] Kirby Chao MD

 M26.603    

Bilateral temporomandibular joint disorder, unspecified

 

           Office Visit 2021 10:30a [facility] Kirby Chao MD

 M26.603    

Bilateral temporomandibular joint disorder, unspecified

 

           Office Visit 06/10/2021  7:20a [facility] Kirby Chao MD

 M26.603    

Bilateral temporomandibular joint disorder, unspecified







Assessments





                Date            Code            Description     Provider

 

                08/10/2021      M26.603         Bilateral temporomandibular join

t disorder, unspecified 

Kirby Chao MD

 

                2021      M26.603         Bilateral temporomandibular join

t disorder, unspecified 

Kirby Chao MD

 

                06/10/2021      M26.603         Bilateral temporomandibular join

t disorder, unspecified 

Kirby Chao MD







Plan of Treatment





No Information Available



Functional Status





                                        Description

 

                                        No Information Available







Mental Status





                                        Description

 

                                        No Information Available







Referrals





                Refer to      Reason for Referral Status          Appt Kirby Cisneros M.D. NP CHRONIC LEFT EAR PAIN REF A [facility]

  Closed          

06/10/2021

 

                                        [address] [place] [address]

 

                                        [phone]

## 2021-10-27 NOTE — CCD
Continuity of Care Document (CCD)

                             Created on: 2021



Xiomara Bains

External Reference #: [id number]

: 1955

Sex: Female



Demographics





                          Address                   [address] [place] [address]  [address]

 

                          Home Phone                [phone]

 

                          Preferred Language        Unknown

 

                          Marital Status            Unknown

 

                          Presybeterian Affiliation     Unknown

 

                          Race                      White

 

                          Ethnic Group              Not  or 





Author





                          Author                    Xiomara BAHENA M.D.

 

                          Organization              Unknown

 

                          Address                   [address] [place]  [address]



 

                          Phone                     [phone]







Care Team Providers





                    Care Team Member Name Role                Phone

 

                    Anajerman Michelle HERNANDEZ AUTM                [phone]

[phone]







Problems





                    Active Problems     Provider            Date

 

                    Crohn's disease     Quique Bahena M.D. Onset: 20

21

 

                    Gastroesophageal reflux disease Quique Bahena M.D. Ons

et: 11/10/2016







Social History





                Type            Date            Description     Comments

 

                Birth Sex                       Unknown          

 

                ETOH Use                        Occasionally     

 

                Tobacco Use     Start: Unknown End: Unknown Patient is a former 

smoker  







Allergies, Adverse Reactions, Alerts





                                        Description

 

                                        No Known Drug Allergies







Medications





           Active Medications SIG        Qnty       Indications Ordering Provide

r Date

 

                    Sutab                     [id number] Tablets            

       as directed         

1box                                    Quique Bahena M.D. 2021

 

                          Trazodone HCL                     100mg Tablets       

            Take One 

Tablet By Mouth Every Evening                                 Unknown         

 

                          Tylenol                     325mg Tablets             

      2 tabs every 4 hours

as needed pain or fever                                 Unknown         

 

           Tramadol HCL                     50mg Tablets                        

                            Unknown    



 

                          Multivitamins                      Capsules           

        by mouth every day

                                                Unknown         

 

                          Hydrochlorothiazide                     12.5mg Capsule

s                   Take 

One Capsule By Mouth Every Day In The Morning                                 Un

known         

 

                          Amlodipine Besylate                     5mg Tablets   

                Take One 

Tablet By Mouth Twice A Day                                 Unknown         

 

                          Duloxetine HCL                     20mg Caps DR Part  

                 Take One 

Capsule By Mouth Every Day                                 Unknown         







Immunizations





                                        Description

 

                                        No Information Available







Vital Signs





                Date            Vital           Result          Comment

 

                2021  3:26pm Height          59 inches       4'11"

 

                    Weight              132.00 lb            

 

                    BP Systolic         132 mmHg             

 

                    BP Diastolic        83 mmHg              

 

                    Heart Rate          75 /min              

 

                    BMI (Body Mass Index) 26.7 kg/m2           

 

                    Weight              59.875 kg            

 

                    Body Temperature    97.2 F             

 

                11/10/2016  3:33pm Height          59 inches       4'11"

 

                    Weight              118.00 lb            

 

                    BP Systolic         125 mmHg             

 

                    BP Diastolic        81 mmHg              

 

                    Heart Rate          85 /min              

 

                    BMI (Body Mass Index) 23.8 kg/m2           

 

                    Weight              53.525 kg            







Results





                                        Description

 

                                        No Information Available







Procedures





                Date            Code            Description     Status

 

                2021      27572           Office/Outpatient New Low MDM 30

-44 Minutes Completed







[facility] [employer]





                                        Description

 

                                        No Information Available







Encounters





           Type       Date       Location   Provider   Dx         Diagnosis

 

           Office Visit 2021  3:15p Main Office Quique Bahena M.D. K

50.80     

Crohn's disease of both small and lg int w/o complications







Assessments





                Date            Code            Description     Provider

 

                    2021          K50.80              Crohn's disease of b

oth small and large intestine without 

complications                           Quique Bahena M.D.







Plan of Treatment

Future Appointment(s):* 10/13/2021  6:45 am - [facility] at Main Office

* 10/27/2021 12:15 pm - Quique Bahena M.D. at Main Office

2021 - Quique Bahena M.D.* K50.80 Crohn's disease of both small and 
  large intestine without complications* Comments:* 67 yo wf who presents in 
  office for f/u of her  IBD/colonoscopy for evaluation of polyps/family h/o 
  coloncancer.  No c/o abdominal pain, weight loss, change in bowel habits, or 
  rectal bleeding. She is c/o hemorroidal pain, and bleeding. She for a h/o  
  Breast Cancer. No diarrhea.   Plan:1. Colonoscopy + ileoscopy.2. Informed 
  consent.









Functional Status





                                        Description

 

                                        No Information Available







Mental Status





                                        Description

 

                                        No Information Available







Referrals





                                        Description

 

                                        No Information Available

## 2021-10-27 NOTE — CCD
Continuity of Care Document (CCD)

                             Created on: 10/15/2021



Xiomara Bains

External Reference #: [id number]

: 1955

Sex: Female



Demographics





                          Address                   [address] [place]  [address]

 

                          Home Phone                [phone]

 

                          Preferred Language        Unknown

 

                          Marital Status            Unknown

 

                          Mormon Affiliation     Unknown

 

                          Race                      White

 

                          Ethnic Group              Not  or 





Author





                          Author                    Xiomara UPTON M.D.

 

                          Organization              Unknown

 

                          Address                   [address] [place] [address]  [address]



 

                          Phone                     [phone]







Care Team Providers





                    Care Team Member Name Role                Phone

 

                    Michelle Umaña AUTM                [phone]

[phone]







Problems





                                        Description

 

                                        No Information Available







Social History





                Type            Date            Description     Comments

 

                Birth Sex                       Unknown          







Allergies and adverse reactions





                                        Description

 

                                        No Information Available







Medications





                                        Description

 

                                        No Information Available







Immunizations





                                        Description

 

                                        No Information Available







Vital Signs





                                        Description

 

                                        No Information Available







Results





                                        Description

 

                                        No Information Available







Procedures





                Date            Code            Description     Status

 

                07/15/2021      83019           Office/Outpatient Established Mo

d MDM 30-39 Min Completed







[facility] [employer]





                                        Description

 

                                        No Information Available







Encounters





           Type       Date       Location   Provider   Dx         Diagnosis

 

           Office Visit 07/15/2021  3:30p Main office - [place] Josy Upton M.D. R26.2      

Difficulty in walking, not elsewhere classified

 

                          R25.8                     Other abnormal involuntary m

ovements

 

                          M47.897                   Other spondylosis, lumbosacr

al region

 

                          R26.9                     Unspecified abnormalities of

 gait and mobility

 

                          G60.9                     Hereditary and idiopathic ne

uropathy, unspecified







Assessments





                Date            Code            Description     Provider

 

                07/15/2021      R26.2           Difficulty in walking, not elsew

here classified Josy Upton M.D.

 

                07/15/2021      R25.8           Other abnormal involuntary movem

ents Josy Upton M.D.

 

                07/15/2021      M47.897         Other spondylosis, lumbosacral r

egion Josy Upton M.D.

 

                07/15/2021      R26.9           Unspecified abnormalities of gai

t and mobility Josy Upton M.D.

 

                07/15/2021      G60.9           Hereditary and idiopathic neurop

athy, unspecified Josy Alexsandra,

M.D.







Plan of Treatment





No Information Available



Functional Status





                                        Description

 

                                        No Information Available







Mental Status





                                        Description

 

                                        No Information Available







Referrals





                                        Description

 

                                        No Information Available

## 2021-10-27 NOTE — CCD
Continuity of Care Document (CCD)

                             Created on: 2021



Xiomara Bains

External Reference #: [id number]

: 1955

Sex: Female



Demographics





                          Address                   [address] [place]  [address]

 

                          Home Phone                [phone]

 

                          Preferred Language        Unknown

 

                          Marital Status            Unknown

 

                          Tenriism Affiliation     Unknown

 

                          Race                      White

 

                          Ethnic Group              Not  or 





Author





                          Author                    Xiomara CHAO MD

 

                          Organization              Unknown

 

                          Address                   [address]

[place]  [address]



 

                          Phone                     [phone]







Care Team Providers





                    Care Team Member Name Role                Phone

 

                    Lesa Alcantar PA-C AUTM                [phone]







Problems





                    Active Problems     Provider            Date

 

                    Temporomandibular joint disorder Kirby Chao MD     Onset: 

2014

 

                    Sensorineural hearing loss, bilateral Kirby Chao MD     On

set: 2014

 

                    Tinnitus            Kirby Chao MD     Onset: 2015

 

                    Conductive hearing loss, bilateral Kirby Chao MD     Onset

: 10/22/2015

 

                    Sensorineural hearing loss, bilateral Kirby Chao MD     On

set: 10/22/2015

 

                    Temporomandibular joint disorder Kirby Chao MD     Onset: 

2015







Social History





                Type            Date            Description     Comments

 

                Birth Sex                       Unknown          

 

                ETOH Use                        Denies alcohol use  

 

                Tobacco Use     Start: Unknown  Patient has never smoked  

 

                Recreational Drug Use                 Denies Drug Use  







Allergies, Adverse Reactions, Alerts





             Active Allergies Reaction     Severity     Comments     Date

 

             NKDA                                                2014

 

             Seasonal                                            2014







Medications





           Active Medications SIG        Qnty       Indications Ordering Provide

r Date

 

           Trazodone HCL                     50mg Tablets                       

                             Unknown    



 

             Hydrochlorothiazide                     12.5mg Tablets             

                                             

Unknown                                 

 

             Amlodipine Besylate                     5mg Tablets                

                                          

Unknown                                 

 

             Duloxetine HCL                     20mg Caps DR Part               

                                           

Unknown                                 

 

             Ketotifen Fumarate                     0.025% Solution             

                                             

Unknown                                 







Immunizations





                                        Description

 

                                        No Information Available







Vital Signs





                Date            Vital           Result          Comment

 

                2021 10:20am Height          59 inches       4'11"

 

                    Weight              132.00 lb            

 

                    BMI (Body Mass Index) 26.7 kg/m2           

 

                    Ideal Body Weight   100 lb               

 

                    Weight              59.875 kg            

 

                    BSA (Body Surface Area) 1.55 m2              

 

                06/10/2021  7:22am Height          59 inches       4'11"

 

                    Weight              120.00 lb            

 

                    BMI (Body Mass Index) 24.2 kg/m2           

 

                    Ideal Body Weight   100 lb               

 

                    Weight              54.432 kg            

 

                    BSA (Body Surface Area) 1.48 m2              







Results





                                        Description

 

                                        No Information Available







Procedures





                Date            Code            Description     Status

 

                2021      86794           Office/Outpatient Established Lo

w MDM 20-29 Min Completed

 

                202105           Arthocentesis, Asp And/Or Inj, I

ntermed Joint  Or Bursa (TMJ) 

Completed

 

                06/10/2021      19939           Office/Outpatient New Low MDM 30

-44 Minutes Completed

 

                06/10/2021      95527           Binocular Microscopy Completed







Medical Devices





                                        Description

 

                                        No Information Available







Encounters





           Type       Date       Location   Provider   Dx         Diagnosis

 

           Office Visit 2021 10:30a [facility] Kirby Chao MD

 M26.603    

Bilateral temporomandibular joint disorder, unspecified

 

           Office Visit 06/10/2021  7:20a [facility] Kirby Chao MD

 M26.603    

Bilateral temporomandibular joint disorder, unspecified







Assessments





                Date            Code            Description     Provider

 

                2021      M26.603         Bilateral temporomandibular join

t disorder, unspecified 

Kirby Chao MD

 

                06/10/2021      M26.603         Bilateral temporomandibular join

t disorder, unspecified 

Kirby Chao MD







Plan of Treatment

Future Appointment(s):* 08/10/2021  9:10 am - Kirby Chao MD at [facility] 
  [facility]

2021 - Kirby Chao MD* M26.603 Bilateral temporomandibular joint 
  disorder, unspecified





Functional Status





                                        Description

 

                                        No Information Available







Mental Status





                                        Description

 

                                        No Information Available







Referrals





                Refer to      Reason for Referral Status          Appt Kirby Cisneros M.D. NP CHRONIC LEFT EAR PAIN REF A [facility]

  Closed          

06/10/2021

 

                                        [address] [place], [place] [address] [phone]

## 2021-10-27 NOTE — CCD
Continuity of Care Document (CCD)

                             Created on: 10/22/2021



Xiomara Bians

External Reference #: [id number]

: 1955

Sex: Female



Demographics





                          Address                   [address] [place]  [address]

 

                          Home Phone                [phone]

 

                          Preferred Language        Unknown

 

                          Marital Status            Unknown

 

                          Congregational Affiliation     Unknown

 

                          Race                      White

 

                          Ethnic Group              Not  or 





Author





                          Author                    Xiomara UPTON M.D.

 

                          Organization              Unknown

 

                          Address                   [address] [place] [address]  [address]



 

                          Phone                     [phone]







Care Team Providers





                    Care Team Member Name Role                Phone

 

                    Michelle Umaña AUTM                [phone]

[phone]







Problems





                                        Description

 

                                        No Information Available







Social History





                Type            Date            Description     Comments

 

                Birth Sex                       Unknown          







Allergies and adverse reactions





                                        Description

 

                                        No Information Available







Medications





                                        Description

 

                                        No Information Available







Immunizations





                                        Description

 

                                        No Information Available







Vital Signs





                                        Description

 

                                        No Information Available







Results





                                        Description

 

                                        No Information Available







Procedures





                Date            Code            Description     Status

 

                10/15/2021      12659           Office/Outpatient Established Mo

d MDM 30-39 Min Completed

 

                07/15/2021      17514           Office/Outpatient Established Mo

d MDM 30-39 Min Completed







Medical Devices





                                        Description

 

                                        No Information Available







Encounters





           Type       Date       Location   Provider   Dx         Diagnosis

 

           Office Visit 10/15/2021  8:45a [facility] [place] Josy Upton M.D. R26.2      

Difficulty in walking, not elsewhere classified

 

                          R25.8                     Other abnormal involuntary m

ovements

 

                          M47.897                   Other spondylosis, lumbosacr

al region

 

                          R26.89                    Other abnormalities of gait 

and mobility

 

           Office Visit 07/15/2021  3:30p Main office - [place] Josy Upton M.D. R26.2      

Difficulty in walking, not elsewhere classified

 

                          R25.8                     Other abnormal involuntary m

ovements

 

                          M47.897                   Other spondylosis, lumbosacr

al region

 

                          R26.9                     Unspecified abnormalities of

 gait and mobility

 

                          G60.9                     Hereditary and idiopathic ne

uropathy, unspecified







Assessments





                Date            Code            Description     Provider

 

                10/15/2021      R26.2           Difficulty in walking, not elsew

here classified Josy Upton M.D.

 

                10/15/2021      R25.8           Other abnormal involuntary movem

ents Josy Upton M.D.

 

                10/15/2021      M47.897         Other spondylosis, lumbosacral r

egion Josy Upton M.D.

 

                10/15/2021      R26.89          Other abnormalities of gait and 

mobility Josy Upton M.D.

 

                07/15/2021      R26.2           Difficulty in walking, not elsew

here classified Josy Upton M.D.

 

                07/15/2021      R25.8           Other abnormal involuntary movem

ents Josy Upton M.D.

 

                07/15/2021      M47.897         Other spondylosis, lumbosacral r

egion Josy Upton M.D.

 

                07/15/2021      R26.9           Unspecified abnormalities of gai

t and mobility Josy Upton M.D.

 

                07/15/2021      G60.9           Hereditary and idiopathic neurop

athy, unspecified Josy Upton M.D.







Plan of Treatment





No Information Available



Functional Status





                                        Description

 

                                        No Information Available







Mental Status





                                        Description

 

                                        No Information Available







Referrals





                                        Description

 

                                        No Information Available

## 2021-10-27 NOTE — CCD
Continuity of Care Document (CCD)

                             Created on: 08/10/2021



Xiomara Bains

External Reference #: [id number]

: 1955

Sex: Female



Demographics





                          Address                   [address] [place]  [address]

 

                          Home Phone                [phone]

 

                          Preferred Language        Unknown

 

                          Marital Status            Unknown

 

                          Hoahaoism Affiliation     Unknown

 

                          Race                      White

 

                          Ethnic Group              Not  or 





Author





                          Author                    Xiomara CHAO MD

 

                          Organization              Unknown

 

                          Address                   [address] 

[address]

[place]  [address]



 

                          Phone                     [phone]







Care Team Providers





                    Care Team Member Name Role                Phone

 

                    Lesa Alcantar PA-C AUTM                [phone]







Problems





                    Active Problems     Provider            Date

 

                    Temporomandibular joint disorder Kirby Chao MD     Onset: 

2014

 

                    Sensorineural hearing loss, bilateral Kirby Chao MD     On

set: 2014

 

                    Tinnitus            Kirby Chao MD     Onset: 2015

 

                    Conductive hearing loss, bilateral Kirby Chao MD     Onset

: 10/22/2015

 

                    Sensorineural hearing loss, bilateral Kirby Chao MD     On

set: 10/22/2015

 

                    Temporomandibular joint disorder Kirby Chao MD     Onset: 

2015







Social History





                Type            Date            Description     Comments

 

                Birth Sex                       Unknown          

 

                ETOH Use                        Denies alcohol use  

 

                Tobacco Use     Start: Unknown  Patient has never smoked  

 

                Recreational Drug Use                 Denies Drug Use  







Allergies, Adverse Reactions, Alerts





             Active Allergies Reaction     Severity     Comments     Date

 

             NKDA                                                2014

 

             Seasonal                                            2014







Medications





           Active Medications SIG        Qnty       Indications Ordering Provide

r Date

 

           Trazodone HCL                     50mg Tablets                       

                             Unknown    



 

             Hydrochlorothiazide                     12.5mg Tablets             

                                             

Unknown                                 

 

             Amlodipine Besylate                     5mg Tablets                

                                          

Unknown                                 

 

             Duloxetine HCL                     20mg Caps DR Part               

                                           

Unknown                                 

 

             Ketotifen Fumarate                     0.025% Solution             

                                             

Unknown                                 







Immunizations





                                        Description

 

                                        No Information Available







Vital Signs





                Date            Vital           Result          Comment

 

                08/10/2021  9:16am Height          59 inches       4'11"

 

                    Weight              128.00 lb            

 

                    BMI (Body Mass Index) 25.9 kg/m2           

 

                    Ideal Body Weight   100 lb               

 

                    Weight              58.061 kg            

 

                    BSA (Body Surface Area) 1.53 m2              

 

                2021 10:20am Height          59 inches       4'11"

 

                    Weight              132.00 lb            

 

                    BMI (Body Mass Index) 26.7 kg/m2           

 

                    Ideal Body Weight   100 lb               

 

                    Weight              59.875 kg            

 

                    BSA (Body Surface Area) 1.55 m2              







Results





                                        Description

 

                                        No Information Available







Procedures





                Date            Code            Description     Status

 

                2021      06167           Office/Outpatient Established Lo

w MDM 20-29 Min Completed

 

                202105           Arthocentesis, Asp And/Or Inj, I

ntermed Joint  Or Bursa (TMJ) 

Completed

 

                06/10/2021      12336           Office/Outpatient New Low MDM 30

-44 Minutes Completed

 

                06/10/2021      31020           Binocular Microscopy Completed







Medical Devices





                                        Description

 

                                        No Information Available







Encounters





           Type       Date       Location   Provider   Dx         Diagnosis

 

           Office Visit 2021 10:30a [facility] Kirby Chao MD

 M26.603    

Bilateral temporomandibular joint disorder, unspecified

 

           Office Visit 06/10/2021  7:20a [facility] Kirby Chao MD

 M26.603    

Bilateral temporomandibular joint disorder, unspecified







Assessments





                Date            Code            Description     Provider

 

                2021      M26.603         Bilateral temporomandibular join

t disorder, unspecified 

Kirby Chao MD

 

                06/10/2021      M26.603         Bilateral temporomandibular join

t disorder, unspecified 

Kirby Chao MD







Plan of Treatment

Future Appointment(s):* 2021 10:00 am - Kirby Chao MD at [facility]





Functional Status





                                        Description

 

                                        No Information Available







Mental Status





                                        Description

 

                                        No Information Available







Referrals





                Refer to      Reason for Referral Status          Appt Kirby Cisneros M.D. NP CHRONIC LEFT EAR PAIN REF A [facility]

  Closed          

06/10/2021

 

                                        [address] [place] [address]

 

                                        [phone]

## 2021-10-27 NOTE — CCD
Summarization of Episode Note

                             Created on: 10/07/2021



NARA GINGER MICHAEL

External Reference #: [id number]

: 1955

Sex: Female



Demographics





                          Address                   [address] [place]  [address]

 

                          Home Phone                [phone]

 

                          Preferred Language        Unknown

 

                          Marital Status            Unknown

 

                          Mandaen Affiliation     Unknown

 

                          Race                      White

 

                          Ethnic Group              Not  or 





Author





                          Author                    [facility]

ems

 

                          Organization              [facility]

ems

 

                          Address                   Unknown

 

                          Phone                     Unavailable







Support





                Name            Relationship    Address         Phone

 

                    GINGER RAMIREZ                [address] [place]  [address]                    [phone]

 

                    Nara Marko      JOSELYN                [address] [place]  [address]                    [phone]







Care Team Providers





                    Care Team Member Name Role                Phone

 

                    Michelle mUaña    Unavailable         [phone]







PROBLEMS





          Type      Condition ICD9-CM Code ZHE38-EG Code Onset Dates Condition S

tatus W/U 

Status              Risk                SNOMED Code         Notes

 

       Problem Hx of Crohn's disease        Z87.19        Active confirmed      

  [id number]  

 

       Problem Wellness examination        Z00.00        Active confirmed       

 [id number]  

 

       Problem Breast nodule        N63           Active confirmed        830819

03  

 

       Problem Insomnia        G47.00        Active confirmed        [id number]  

 

       Problem Family history of colon cancer        Z80.0         Active confir

med        060768532  

 

       Problem Low magnesium levels        E83.42        Active confirmed       

 350402374  

 

        Problem GERD (gastroesophageal reflux disease)         K21.9           A

ctive  confirmed         

[id number]                               She has a history of esophageal reflux d

isease which is quiescent at 

present and for which she takes no medication.

 

       Problem Dermatitis        L30.9         Active confirmed        70174106 

 

 

       Problem Abnormal mammogram        R92.8         Active confirmed        1

17200614  

 

       Problem Uterine mass        N85.9         Active confirmed        5957834

4  

 

       Problem Left shoulder pain        M25.512        Active confirmed        

25773575  

 

       Problem Essential hypertension        I10           Active confirmed     

   26093376 She is 

maintained on lisinopril therapy with good control of her blood pressure.

 

          Problem   Breast cancer metastasized to axillary lymph node           

C50.919             Active    

confirmed                               [id number]            

 

       Problem Magnesium deficiency        E61.2         Active confirmed       

 064902202  

 

        Problem History of Clostridium difficile colitis         Z86.19         

 Active  confirmed         

048827514                                

 

       Problem Anemia        D64.9         Active confirmed        799938092  

 

       Problem Nausea        R11.0         Active confirmed        389413405  

 

       Problem Internal hemorrhoids        K64.8         Active confirmed       

 71792309  

 

        Problem TMJ (temporomandibular joint disorder)         M26.609         A

ctive  confirmed         

42842552                                 

 

       Problem Toenail fungus        B35.1         Active confirmed        98956

9006  

 

       Problem Other chronic pain        G89.29        Active confirmed        8

6904793  

 

        Problem Hx of temporomandibular joint disorder         Z87.39          A

ctive  confirmed         

069339344                                

 

       Problem Anxiety        F41.9         Active confirmed        20729713  

 

        Problem Malignant neoplasm of right female breast         C50.911       

  Active  confirmed  

                          [id number]                 She was diagnosed with breas

t cancer in 2016 involving the 

right breast--HER-2 positive, ER/NE negative disease. She was treated with 
neoadjuvant chemotherapy (TRYPHAENA regimen) which she has now completed. She is
now scheduled for bilateral mastectomies and reconstruction surgery on 
2016.

 

       Problem Fatty liver        K76.0         Active confirmed        91568505

7  

 

        Problem Ductal carcinoma of right breast         C50.911         Active 

 confirmed         

117075559                                

 

          Problem   Alcohol withdrawal syndrome without complication           F

10.230             Active    

confirmed                               299995990            







ALLERGIES

No Known Allergies



ENCOUNTERS from 1955 to 2021-10-06





             Encounter    Location     Date         Provider     Diagnosis

 

                    [address] [phone] [place] [address] 05 Oct, 2021

                          Michelle Servage            Essential hypertension I10 ;

 Well adult exam Z00.00 ; TMJ 

(temporomandibular joint disorder) M26.609 ; Alcohol withdrawal syndrome without
complication F10.230 ; Fatty liver K76.0 ; Ductal carcinoma of right breast 
C50.911 ; Anxiety F41.9 ; Encounter for immunization Z23 ; Hx of Crohn's disease
Z87.19 ; Insomnia G47.00 and Anemia, unspecified type D64.9







IMMUNIZATIONS





                Vaccine         Route           Administration Date Status

 

                Influenza 18 yrs & older Flublok IM Intramuscular Oct 05, 2021  

  Administered

 

                Influenza 18 yrs & older Flublok IM Intramuscular Oct 29, 2020  

  Administered







SOCIAL HISTORY

Tobacco Use:



                    Social History Observation Description         Date

 

                    Details (start date - stop date) Former Smoker        



Sex Assigned At Birth:



                          Social History Observation Description

 

                          Sex Assigned At Birth     Unknown



Audit



                    Question            Answer              Notes

 

                    Total Score:        0                    

 

                    Interpretation:     Alcohol Education    



Sexual Hx:



                    Question            Answer              Notes

 

                    Had sex in the last 12 months (vaginal, oral, or anal)? No  

                 

 

                    Have you ever had an STD? No                   



Drug and Alcohol



                    Question            Answer              Notes

 

                    Total Score:        0                    

 

                    Interpretation:     No problems reported  



Alcohol Screening:



                    Question            Answer              Notes

 

                    Points              0                    

 

                    Interpretation      Negative             



Tobacco Use:



                    Question            Answer              Notes

 

                    Are you a:          former smoker        

 

                    How long has it been since you last smoked? 5-10 years      

     







REASON FOR REFERRAL

No Information



VITAL SIGNS





                    Weight              136.2 lbs           05 Oct, 2021

 

                    Weight-kg           61.78 kg            05 Oct, 2021

 

                    Height              4'11" in            05 Oct, 2021

 

                    BMI                 27.51 kg/m2         05 Oct, 2021

 

                    Heart Rate          68 /min             05 Oct, 2021

 

                    Respiratory Rate    18 /min             05 Oct, 2021

 

                    Temperature         96.3 degrees Fahrenheit 05 Oct, 2021

 

                    Oximetry            99                  05 Oct, 2021

 

                    Blood pressure systolic 122 mm Hg           05 Oct, 2021

 

                    Blood pressure diastolic 80 mm Hg            05 Oct, 2021







MEDICATIONS





           Medication SIG (Take, Route, Frequency, Duration) Notes      Start Da

te End Date   

Status

 

           Loratadine 10 MG 1 tablet Orally Once a day for 90 days            09

 Oct, 2019            Active

 

                          hydroCHLOROthiazide 12.5 MG 1 capsule in the morning O

rally Once a day for 90 

days                                                            Active

 

           DULoxetine HCl 20 MG 1 capsule Orally Daily for 90 days              

                    Active

 

                          Ketotifen Fumarate 0.025 % INSTILL 1 DROP INTO BOTH EY

ES TWICE A DAY AS NEEDED 

FOR ITCHING for 25                                                 Active

 

                          traMADol HCl 50 MG        1 tablet as needed Orally 15

[id number] three times daily MDD3 

for 7 days                      05 Oct, 2021                    Active

 

           Trazodone HCl 100 MG 1 tablet at bedtime Orally Once a day for 90 day

s                                  

Active







PROCEDURES from 1955 to 2021-10-06





                Procedure       Date Ordered    Result          Body Site

 

                Imm: Flublok Quadrivalent 18 years & older 0.5mL IM Influenza 20

21-10-05      N/A              







RESULTS

No Results



REASON FOR VISIT

annual wellness



MEDICAL (GENERAL) HISTORY





                    Type                Description         Date

 

                          Medical History           right breast, stage IIIA, T3

N2M0, HER2 positive, ER/NE negative.

Invasive ductal carcinoma, stage 0, T0 N0 M0 following [place]renton-adjuvant combined 
chemotherapy status post bilateral mastectomy with immediate 
reconstruction16 [facility] tumor involves approximately 70% of the submitted 
tissue                                   

 

                          Medical History           breast cancer treated with D

exedrine, carboplatin with  dual HER

2 agent, Herceptin and Perjeta on the TRYPHENA regimen  

 

                          Medical History           bilateral mastectomy  2016 [facility] (chose to have 

both breast removed)                     

 

                    Medical History     Hx Endometrosis      

 

                    Medical History     Depression/Anxiety use to follow with Dr Paul Ramirez  

 

                    Medical History     Insomnia             

 

                    Medical History     remote smoker        

 

                          Medical History           Crohn's use to follow with DARLENE Casanova seen by Dr. Shell 2016, advised to follow on a when necessary basis  

 

                    Medical History     C. difficile colitis 2016 after

 first infusion  

 

                          Medical History           echocardiogram 07/15/2016, e

chocardiogram 2016, LVEF 65% 

normal left ventricular size and systolic function normal diastolic function. No
significant valvular disease.            

 

                          Medical History           colonoscopy 2016, grad

e 1 internal hemorrhoids, localized 

mild inflammation was found in the rectosigmoid colon secondary to colitis.  

 

                    Medical History     17 [facility] [employer]  

 

                    Surgical History    endometriosis       80's

 

                    Surgical History    Partial colectomy for Crohn's disease 

 

                    Surgical History    Bilateral mastectomy with reconstruction

 2016

 

                    Hospitalization History [facility]-colonic resection 

 

                    Hospitalization History Ruptured ear drums   

 

                          Hospitalization History   [facility]- Primary-Intractable pamela

rrhea secondary to colitis- 

Secondary-History of C-Diff in past(ruled out), Right breast cancer undergoing 
chemotherapy, History of Crohn's disease status post colon resection in past 

-2016

 

                          Hospitalization History   [facility]- Fevers, Secondary throm

ocytopenia, Diarrhea with a 

negative GI panel, Hypokalemia, Hypomagnesemia, Ductal carcinoma of the right 
breast, Hx of crohns disease, HTN, Anemia -2016

 

                          Hospitalization History   [facility]-acute alcohol withdrawal

, hypertensive urgency 

anxiety                                 3/5-2021







Goals Section

No Information



Health Concerns

No Information



MEDICAL EQUIPMENT

No Information



MENTAL STATUS

No Information



FUNCTIONAL STATUS

No Information



ASSESSMENTS





             Encounter Date Diagnosis    Assessment Notes Treatment Notes Treatm

ent Clinical 

Notes

 

                05 Oct, 2021    Essential hypertension (ICD-10 - I10)           

      



                                        



Per JNC 8 guidelines, goal /90 if age >60, is meeting goal on current 
regimen. Advised heart-healthy diet, sodium restriction 



Discontinued the amlodipine currently on hydrochlorothiazide only, will monitor 
her blood pressure at home if greater than 140/90 will call

 

                05 Oct, 2021    Well adult exam (ICD-10 - Z00.00)               

  



                                        



age appropriate anticipatory guidance given, per USPSTF recommendations; 
immunizations up to date. discussed plans for implementing improvement in 
identified areas



Will be getting her Covid third shot when available

 

                05 Oct, 2021    TMJ (temporomandibular joint disorder) (ICD-10 -

 M26.609)                 



                                        



following with ENT sent to physical therapy ended up having to have a cortisone 
injection tramadol 50 mg p.o. 3 times daily as needed ordered for flareup [facility] I stop reviewed [id number]

 

                          05 Oct, 2021              Alcohol withdrawal syndrome 

without complication (ICD-10 - F10.230)

                                                    



                                        



stopped 3/3/21 does not plan on restarting attending AA has sponsor and going to
private counseling with Sanya

 

                05 Oct, 2021    Fatty liver (ICD-10 - K76.0)                 



                                        



identified on  radiologic study

 

                05 Oct, 2021    Ductal carcinoma of right breast (ICD-10 - C50.9

11)                 



                                        



continues to follow with Dr. Abigail Maddox  She her Herceptin treatments  total of 
17 treatments. She started these on 2016. Per Dr. Barron's note she had 
aggressive chemotherapy. She has had bilateral mastectomies with immediate 
reconstruction with Kirstie Osei M.D. at [facility]. She had echocardiogram 
following her Herceptin treatments. . She has no issues on her echocardiogram at
the current time, and has faint neuropathy of her fingers and toes per patient, 
continue to do well per oncology notes. 



asked for refill for nausea meds. 



slip written for work for reduced work hours. 



[employer] 21 years 



2020 reduced hours honored. contnues to follow with Dr. Maddox 



requesting zofran for nausea, not covered by insurance. 



3/2021 has completed all treatments 



 recent check up of a nodule fatty necrosis



 now on the yearly follow-up plan last MRI in January was clear

 

                05 Oct, 2021    Anxiety (ICD-10 - F41.9)                 



                                        



Depression/Anxiety use to follow with Dr. Ramirez been on zoloft and paxil in 
the past not effective 



Currently on duloxetine and trazodone with effect 



following with private counselor 



discussed,  Cymbalta, will continue, for now through the winter months

 

                05 Oct, 2021    Encounter for immunization (ICD-10 - Z23)       

          



                                        



flu vaccine today

 

                05 Oct, 2021    Hx of Crohn's disease (ICD-10 - Z87.19)         

        



                                        



Colonoscopy 2016, nonbleeding internal hemorrhoids, localized mild 
inflammation was found in the rectosigmoid colon secondary to colitis. Patient 
and decide ileocolonic anastomosis healthy., Seen by Dr. Shell 2016. 
He told her she needs to follow on a when necessary basis only with him. He 
prescribed her suppositories for her hemorrhoids. 



since she stopped drinking alcohol bowels have improved. 



Has colonoscopy scheduled 10/27/2021 for 5-year follow-up

 

                05 Oct, 2021    Insomnia (ICD-10 - G47.00)                 



                                        



use to follow with Dr. Connors, no longer needs his service  she is on 
trazodone ordered thru this office she takes regularly she is requesting a 
higher dose having issues sleeping 



risk and benifits discussed 



states she wants 100 mg  instead of 150 mg 



3/2021 trazadone decreased to 50 mg. due to heart rate in hospital 



 continues to due well 



 will increase trazadone to 100 mg po q hs prn 



1021, trazodone 100 mg is working will continue this dose

 

                05 Oct, 2021    Anemia, unspecified type (ICD-10 - D64.9)       

          



                                        





 

             05 Oct, 2021 Other                                  6 month routine

 visit







PLAN OF TREATMENT

Medication



                Medication Name Sig             Start Date      Stop Date

 

                Trazodone HCl 100 MG 1 tablet at bedtime Orally Once a day for 9

0 days                  

 

                          traMADol HCl 50 MG        1 tablet as needed Orally 15

[id number] three times daily MDD3 

for 7 days                05 Oct, 2021               

 

                          hydroCHLOROthiazide 12.5 MG 1 capsule in the morning O

rally Once a day for 90 

days                                                 

 

                DULoxetine HCl 20 MG 1 capsule Orally Daily for 90 days         

         



Treatment Notes



                    Assessment          Notes               Clinical Notes

 

                    Essential hypertension                     Per JNC 8 guideli

yaquelin, goal /90 if age >60, is 

meeting goal on current regimen. Advised heart-healthy diet, sodium 
restrictionDiscontinued the amlodipine currently on hydrochlorothiazide only, 
will monitor her blood pressure at home if greater than 140/90 will call

 

                    Well adult exam                         age appropriate anti

cipatory guidance given, per USPSTF 

recommendations; immunizations up to date. discussed plans for implementing 
improvement in identified areasWill be getting her Covid third shot when 
available

 

                    TMJ (temporomandibular joint disorder)                     f

ollowing with ENT sent to physical 

therapy ended up having to have a cortisone injection tramadol 50 mg p.o. 3 
times daily as needed ordered for flareup [facility] I stop reviewed 
[id number]

 

                    Alcohol withdrawal syndrome without complication            

         stopped 3/3/21 does not plan 

on restarting attending AA has sponsor and going to private counseling with 
Sanya

 

                    Fatty liver                             identified on  radio

logic study

 

                    Ductal carcinoma of right breast                     continu

es to follow with Dr. Abigail Maddox  She 

her Herceptin treatments  total of 17 treatments. She started these on 
2016. Per Dr. Barron's note she had aggressive chemotherapy. She has had 
bilateral mastectomies with immediate reconstruction with Kirstie Osei M.D. at 
[facility]. She had echocardiogram following her Herceptin treatments. . She 
has no issues on her echocardiogram at the current time, and has faint 
neuropathy of her fingers and toes per patient, continue to do well per oncology
notes.asked for refill for nausea meds.slip written for work for reduced work 
hours.[employer]  reduced hours honored. contnues to 
follow with Dr. Travis amor for nausea, not covered by 
insurance.3/2021 has completed all treatments recent check up of a nodule fa
tty [id number] now on the yearly follow-up plan last MRI in January was 
clear

 

                    Anxiety                                 Depression/Anxiety u

se to follow with Dr. Ramirez been on zoloft and

paxil in the past not effectiveCurrently on duloxetine and trazodone with 
effectfollowing with private counselordiscussed,  Cymbalta, will continue, for 
now through the winter months

 

                    Encounter for immunization                     flu vaccine t

harper

 

                    Hx of Crohn's disease                     Colonoscopy 2016, nonbleeding internal 

hemorrhoids, localized mild inflammation was found in the rectosigmoid colon 
secondary to colitis. Patient and decide ileocolonic anastomosis healthy., Seen 
by Dr. Shell 2016. He told her she needs to follow on a when necessary
basis only with him. He prescribed her suppositories for her hemorrhoids.since 
she stopped drinking alcohol bowels have improved.Has colonoscopy scheduled 
10/27/2021 for 5-year follow-up

 

                    Insomnia                                use to follow with DARLENE Connors, no longer needs his service  she is

on trazodone ordered thru this office she takes regularly she is requesting a 
higher dose having issues sleepingrisk and benifits discussedstates she wants 
100 mg  instead of 150 mg3/2021 trazadone decreased to 50 mg. due to heart rate 
in hospital continues to due well will increase trazadone to 100 mg po q
hs [id number], trazodone 100 mg is working will continue this dose



Next Appt



                                        Details

 

                                        6 Months routine visit Reason:

 

                                        Provider Name:Michelle L Anajerman,  10:30:00 AM, [address] [facility], 

[phone], [place], [address], [phone]







Insurance Providers





             Payer Name   Payer Address Payer Phone  Insured Name Patient Relati

onship to 

Insured                   Coverage Start Date       Coverage End Date

 

           [facility] [address]  [facility] [address] [phone] GINGER VANCE self

## 2022-07-19 ENCOUNTER — HOSPITAL ENCOUNTER (OUTPATIENT)
Dept: HOSPITAL 53 - M SFHCPLAZ | Age: 67
End: 2022-07-19
Attending: NURSE PRACTITIONER
Payer: COMMERCIAL

## 2022-07-19 DIAGNOSIS — Z12.4: Primary | ICD-10-CM

## 2023-07-10 ENCOUNTER — HOSPITAL ENCOUNTER (OUTPATIENT)
Dept: HOSPITAL 53 - M SFHCPLAZ | Age: 68
End: 2023-07-10
Attending: NURSE PRACTITIONER
Payer: COMMERCIAL

## 2023-07-10 DIAGNOSIS — Z12.4: Primary | ICD-10-CM

## 2023-07-25 ENCOUNTER — HOSPITAL ENCOUNTER (OUTPATIENT)
Dept: HOSPITAL 53 - M RAD | Age: 68
End: 2023-07-25
Attending: NURSE PRACTITIONER
Payer: COMMERCIAL

## 2023-07-25 DIAGNOSIS — Z98.82: ICD-10-CM

## 2023-07-25 DIAGNOSIS — Z90.13: ICD-10-CM

## 2023-07-25 DIAGNOSIS — Z85.3: Primary | ICD-10-CM

## 2024-09-10 ENCOUNTER — HOSPITAL ENCOUNTER (OUTPATIENT)
Dept: HOSPITAL 53 - M RAD | Age: 69
End: 2024-09-10
Attending: INTERNAL MEDICINE
Payer: MEDICARE

## 2024-09-10 DIAGNOSIS — Z98.82: ICD-10-CM

## 2024-09-10 DIAGNOSIS — Z85.3: Primary | ICD-10-CM

## 2024-10-03 ENCOUNTER — HOSPITAL ENCOUNTER (OUTPATIENT)
Dept: HOSPITAL 53 - M WHC | Age: 69
End: 2024-10-03
Attending: INTERNAL MEDICINE
Payer: MEDICARE

## 2024-10-03 DIAGNOSIS — Z85.3: ICD-10-CM

## 2024-10-03 DIAGNOSIS — M85.89: Primary | ICD-10-CM
